# Patient Record
Sex: FEMALE | Race: WHITE | NOT HISPANIC OR LATINO | Employment: OTHER | URBAN - METROPOLITAN AREA
[De-identification: names, ages, dates, MRNs, and addresses within clinical notes are randomized per-mention and may not be internally consistent; named-entity substitution may affect disease eponyms.]

---

## 2017-02-02 PROBLEM — M53.3 COCCYGEAL PAIN, CHRONIC: Status: ACTIVE | Noted: 2017-02-02

## 2017-02-02 PROBLEM — G89.29 COCCYGEAL PAIN, CHRONIC: Status: ACTIVE | Noted: 2017-02-02

## 2018-03-06 ENCOUNTER — TELEMEDICINE2 (OUTPATIENT)
Dept: ENDOCRINOLOGY | Facility: MEDICAL CENTER | Age: 58
End: 2018-03-06
Payer: COMMERCIAL

## 2018-03-06 VITALS
DIASTOLIC BLOOD PRESSURE: 88 MMHG | HEART RATE: 77 BPM | WEIGHT: 143 LBS | OXYGEN SATURATION: 95 % | BODY MASS INDEX: 28.07 KG/M2 | HEIGHT: 60 IN | SYSTOLIC BLOOD PRESSURE: 130 MMHG

## 2018-03-06 DIAGNOSIS — Z98.84 HISTORY OF GASTRIC BYPASS: ICD-10-CM

## 2018-03-06 DIAGNOSIS — M81.0 POSTMENOPAUSAL OSTEOPOROSIS: ICD-10-CM

## 2018-03-06 DIAGNOSIS — E55.9 VITAMIN D DEFICIENCY: ICD-10-CM

## 2018-03-06 PROCEDURE — 99204 OFFICE O/P NEW MOD 45 MIN: CPT | Mod: GT | Performed by: INTERNAL MEDICINE

## 2018-03-06 RX ORDER — TIZANIDINE 4 MG/1
4 TABLET ORAL EVERY 6 HOURS PRN
COMMUNITY
End: 2021-01-09

## 2018-03-06 RX ORDER — OXYCODONE AND ACETAMINOPHEN 2.5; 325 MG/1; MG/1
1 TABLET ORAL EVERY 4 HOURS PRN
COMMUNITY
End: 2019-02-26

## 2018-03-06 NOTE — PROGRESS NOTES
New Patient Consult Note  Primary care physician: Linsey West D.O.    Reason for consult: Osteoporosis    HPI:  Lachelle Hilton is a 57 y.o. old patient who presents for tele-consultation today for  osteoporosis. She was diagnosed with osteoporosis about 10 years ago. She underwent gastric bypass surgery, Gurinder-en-Y procedure, about 19 years ago. She took 4 yearly infusions of Reclast, most recent infusion was done in January 2017. No history of hip fracture. No history of vertebral compression fracture. She does have history of hip fracture, hand fracture, toe fracture in the past a few years with minimal trauma. She denies chronic issues with diarrhea. She stopped having periods at age 25, she was told that she had premature menopause. She was never started on estrogen replacement. She remembered having hot flashes and postmenopausal symptoms in her mid 20s. She takes vitamin D 4000 IUs daily. She takes supplemental calcium once a day with dinner. She does not take bariatric multivitamin/mineral supplement.    ROS:  Constitutional: Psitive for weight loss  Cardiac: No palpitations or racing heart  Resp: No shortness of breath  All other systems were reviewed and were negative.    Past Medical History:  Patient Active Problem List    Diagnosis Date Noted   • Coccygeal pain, chronic 02/02/2017   • Anxiousness 11/10/2016   • Vitamin B 12 deficiency 11/10/2016   • Lumbar spine pain 11/10/2016   • Osteoporosis 11/10/2016   • Reactive airway disease without complication 11/10/2016       Past Surgical History:  Past Surgical History:   Procedure Laterality Date   • GASTRIC BYPASS LAPAROSCOPIC  April 1999    gurinder-en-y collapsed; complications over one year after       Allergies:  Nsaids    Social History:  Social History     Social History   • Marital status:      Spouse name: N/A   • Number of children: N/A   • Years of education: N/A     Occupational History   • Not on file.     Social History Main Topics   •  Smoking status: Never Smoker   • Smokeless tobacco: Never Used   • Alcohol use No   • Drug use: No   • Sexual activity: Yes     Partners: Male      Comment: ; moved from [facility] in July 2016. One child from a previous marriage     Other Topics Concern   • Not on file     Social History Narrative   • No narrative on file       Family History:  Family History   Problem Relation Age of Onset   • Heart Disease Mother 67     CHF   • Cancer Father 58     brain and lung; former smoker       Medications:    Current Outpatient Prescriptions:   •  oxycodone-acetaminophen (PERCOCET) 2.5-325 MG per tablet, Take 1 Tab by mouth every four hours as needed for Severe Pain., Disp: , Rfl:   •  tizanidine (ZANAFLEX) 4 MG Tab, Take 4 mg by mouth every 6 hours as needed., Disp: , Rfl:   •  Calcium 500-100 MG-UNIT Chew Tab, Take  by mouth., Disp: , Rfl:   •  albuterol 108 (90 BASE) MCG/ACT Aero Soln inhalation aerosol, Inhale 2 Puffs by mouth every 6 hours as needed for Shortness of Breath., Disp: 8.5 g, Rfl: 2  •  Omega-3 Fatty Acids (FISH OIL) 1000 MG Cap capsule, Take 1,000 mg by mouth 3 times a day, with meals., Disp: , Rfl:   •  vitamin D (CHOLECALCIFEROL) 1000 UNIT Tab, Take  by mouth every day. 2000 units, Disp: , Rfl:   •  Ascorbic Acid (VITAMIN C) 500 MG Cap, Take  by mouth., Disp: , Rfl:   •  Magnesium 250 MG Tab, Take  by mouth., Disp: , Rfl:   •  Multiple Vitamins Tab, Take  by mouth., Disp: , Rfl:   •  lorazepam (ATIVAN) 0.5 MG Tab, Take 1 Tab by mouth 1 time daily as needed for Anxiety., Disp: 30 Tab, Rfl: 0  •  hydrocodone-acetaminophen (NORCO) 5-325 MG Tab per tablet, Take 1 Tab by mouth 1 time daily as needed. 1 every 4 hrs prn pain, Disp: 30 Tab, Rfl: 0    Physical Examination:  Vital signs: /88   Pulse 77   Ht 1.524 m (5')   Wt 64.9 kg (143 lb)   SpO2 95%   BMI 27.93 kg/m²   General: No apparent distress, cooperative  Eyes: No obvious exophthalmos  ENMT: Normal on external inspection of nose,  lips  Neck: On visual inspection no obvious mass seen  Resp: Normal effort, clear to auscultation bilaterally (this was done by trained tele-presenter at the originating site)  CVS: Regular rate and rhythm (this was done by trained tele-presenter at the originating site)  Extremities: No edema (this was done by trained tele-presenter at the originating site)  Neuro: Alert and oriented  Skin: No rash on visible skin  Psych: Normal mood and affect    Assessment and Plan:    Postmenopausal osteoporosis  · Most recent bone density scan was done in June 2016, on which she still had osteoporosis  · She has received four yearly infusions of Reclast, most recently in January 2017  · We will repeat bone density scan now, depending upon bone density results we will discuss about Forteo or Prolia  · Recent 24-hour urine calcium was less than 75 mg, indicating inadequate intake/absorption of calcium from diet  · Advised to take supplemental calcium calcium citrate 600 milligram 3 times a day with meals  -     DS-BONE DENSITY STUDY (DEXA); Future    Vitamin D deficiency  · Recent vitamin D level is 31, and her PTH is slightly elevated at 85, which is most likely secondary hyperparathyroidism due to inadequate calcium intake/absorption from diet  · Advised to double the dose of her vitamin D supplement for now    History of gastric bypass  · We will obtain yearly recommended labs for patient after gastric bypass surgery  -     VITAMIN A; Future  -     VITAMIN E; Future  -     VITAMIN K; Future  -     VITAMIN B1; Future  -     VITAMIN B12; Future  -     FOLATE; Future  -     ZINC SERUM; Future  -     COPPER, SERUM; Future  -     SELENIUM, SERUM; Future  -     VITAMIN C SERUM; Future    Return in about 4 weeks (around 4/3/2018).    Thank you for allowing me to participate in the care of this patient.    This consultation was conducted utilizing secure and encrypted videoconferencing equipment with the assistance of a trained  tele-presenter at the originating site.    Zoey Collier M.D.  03/06/18    CC:   Linsey West D.O.    This note was created using voice recognition software (Dragon). The accuracy of the dictation is limited by the abilities of the software. I have reviewed the note prior to signing, however some errors in grammar and context are still possible. If you have any questions related to this note please do not hesitate to contact our office.

## 2018-03-15 DIAGNOSIS — M81.0 POSTMENOPAUSAL OSTEOPOROSIS: ICD-10-CM

## 2018-04-17 PROBLEM — J30.89 ENVIRONMENTAL AND SEASONAL ALLERGIES: Status: ACTIVE | Noted: 2018-04-17

## 2018-11-26 PROBLEM — M81.0 AGE-RELATED OSTEOPOROSIS WITHOUT CURRENT PATHOLOGICAL FRACTURE: Status: ACTIVE | Noted: 2018-11-26

## 2019-01-23 ENCOUNTER — OFFICE VISIT (OUTPATIENT)
Dept: ENDOCRINOLOGY | Facility: MEDICAL CENTER | Age: 59
End: 2019-01-23
Payer: COMMERCIAL

## 2019-01-23 VITALS
SYSTOLIC BLOOD PRESSURE: 116 MMHG | BODY MASS INDEX: 26.11 KG/M2 | HEART RATE: 84 BPM | OXYGEN SATURATION: 95 % | HEIGHT: 60 IN | WEIGHT: 133 LBS | DIASTOLIC BLOOD PRESSURE: 64 MMHG

## 2019-01-23 DIAGNOSIS — E28.319 MENOPAUSE, PREMATURE: ICD-10-CM

## 2019-01-23 DIAGNOSIS — R79.89 HIGH SERUM PARATHYROID HORMONE (PTH): ICD-10-CM

## 2019-01-23 DIAGNOSIS — Z98.84 BARIATRIC SURGERY STATUS: ICD-10-CM

## 2019-01-23 DIAGNOSIS — M81.0 OSTEOPOROSIS WITHOUT CURRENT PATHOLOGICAL FRACTURE, UNSPECIFIED OSTEOPOROSIS TYPE: ICD-10-CM

## 2019-01-23 DIAGNOSIS — E53.8 VITAMIN B 12 DEFICIENCY: ICD-10-CM

## 2019-01-23 DIAGNOSIS — E55.9 VITAMIN D DEFICIENCY: ICD-10-CM

## 2019-01-23 DIAGNOSIS — M81.0 AGE-RELATED OSTEOPOROSIS WITHOUT CURRENT PATHOLOGICAL FRACTURE: ICD-10-CM

## 2019-01-23 PROCEDURE — 99214 OFFICE O/P EST MOD 30 MIN: CPT | Performed by: INTERNAL MEDICINE

## 2019-01-23 RX ORDER — VITAMIN E 268 MG
400 CAPSULE ORAL DAILY
COMMUNITY

## 2019-01-24 NOTE — PROGRESS NOTES
Chief Complaint   Patient presents with   • Osteoporosis   • Menopause     Premature   • Vitamin D Deficiency         Chief complaint        Osteoporosis    Patient was previously treated by Dr. Collier who has now left our office to join the staff at the [facility].  He is seeing her primarily for management of osteoporosis.     Her most recent bone density that I have was done March 2018.  At that point, she did have significant osteoporosis with a T-score of -2.9 in the lumbar spine and -3.4 at the left hip.      The patient has had major events in her life that have set the stage for her osteoporosis.  Initially she went through premature menopause at age 25.  She had a tubal ligation and her menses stopped after that and apparently she was estrogen deficient.  She is unclear about estrogen replacement over the years subsequently but I don’t think it was significant and steady.  The next major event was about 20 years ago when she underwent bariatric surgery, a sabine-en-Y procedure at [facility].  That apparently had complications.  She was in the hospital for months.  She had open wounds in her abdomen that were slow to heal.  She lost significant weight during that period of time.    More recently she had taken Reclast for four years.  That apparently held the line on her bone density but it did not improve her bone density.  Subsequently she has been started on Forteo in March 2018.  She went on vacation September 2018 and did not take her Forteo with her.  After she returned home, she was out of the habit of using it and has simply just stopped using it since then.  For some reason, she does not want to go back to Forteo.  She would like to switch to Prolia which will be fine with me.  Perhaps we should get a new bone density this coming March which is a year from the last one to serve as a new baseline to measure the effectiveness of Prolia.      Because of her bypass she has had a vitamin B12 deficiency and gets  "monthly injections.  She does take vitamin D as a calcium vitamin D supplement.  I do not have recent laboratory data that evaluates her deficiencies and her supplements.      We will catch up on her lab data and also a bone density in March.  I will submit an authorization for Prolia which would be my first choice.  Then we can haveForteo for back up in the future if she starts to have serious fracture problems.  She is in agreement.    ROS:  She has been exercising effectively she is managing her weight that way and also building strength.  It does cause a good deal of soreness and stiffness but she is determined to stay with.      Allergies:   Allergies   Allergen Reactions   • Nsaids      Other reaction(s): Adverse Drug Reaction  \"Bleeder\"       Current medicines including changes today:  Current Outpatient Prescriptions   Medication Sig Dispense Refill   • vitamin e (VITAMIN E) 400 UNIT Cap Take 400 Units by mouth every day.     • B Complex Vitamins (B COMPLEX 1 PO) Take  by mouth.     • omeprazole (PRILOSEC) 20 MG delayed-release capsule TAKE 1 CAPSULE BY MOUTH EVERY DAY 90 Cap 1   • oxycodone-acetaminophen (PERCOCET) 2.5-325 MG per tablet Take 1 Tab by mouth every four hours as needed for Severe Pain.     • tizanidine (ZANAFLEX) 4 MG Tab Take 4 mg by mouth every 6 hours as needed.     • Calcium 500-100 MG-UNIT Chew Tab Take  by mouth.     • Multiple Vitamins Tab Take  by mouth.     • PROAIR  (90 Base) MCG/ACT Aero Soln inhalation aerosol INHALE 2 PUFFS BY MOUTH EVERY 6 HOURS AS NEEDED FOR SHORTNESS OF BREATH 8.5 g 2   • betamethasone dipropionate (DIPROLENE) 0.05 % Ointment Used to affected areas of the feet for 1-2 weeks (Patient not taking: Reported on 1/23/2019) 1 Tube 0   • montelukast (SINGULAIR) 10 MG Tab Take 1 Tab by mouth every day. (Patient not taking: Reported on 1/23/2019) 30 Tab 5   • mometasone (NASONEX) 50 MCG/ACT nasal spray Spray 2 Sprays in nose every day. (Patient not taking: " Reported on 1/23/2019) 1 Inhaler 0   • benzonatate (TESSALON) 200 MG capsule Take 1 Cap by mouth 3 times a day as needed for Cough. (Patient not taking: Reported on 1/23/2019) 30 Cap 0   • Teriparatide, Recombinant, (FORTEO) 600 MCG/2.4ML Solution Inject 20 mcg as instructed every day. (Patient not taking: Reported on 1/23/2019) 1 PEN 6   • Omega-3 Fatty Acids (FISH OIL) 1000 MG Cap capsule Take 1,000 mg by mouth 3 times a day, with meals.     • vitamin D (CHOLECALCIFEROL) 1000 UNIT Tab Take  by mouth every day. 2000 units     • Ascorbic Acid (VITAMIN C) 500 MG Cap Take  by mouth.     • Magnesium 250 MG Tab Take  by mouth.       No current facility-administered medications for this visit.         Past Medical History:   Diagnosis Date   • Anxiety    • Arthritis    • Coccygeal pain, chronic 2012    an employee ran into her and she fell on tailbone   • History of colonoscopy 07/15/2014    Normal and good for 10 years.  Done at [facility].   • History of mammogram 6/17/16    mammo   • History of Papanicolaou smear of cervix 6/24/16    normal at [facility]   • History of pneumococcal infection 1999; 2011   • History of pneumonia     Never a smoker   • Lumbar spine pain     Last epidural in early 2013, she's had trigger point injections that are somewhat helpful.   • Osteoporosis 2014    RECLAST received 9/'14, 9/'15 thru [facility]; last dexa at [facility] showed T-score -3.0 total L hip   • Osteoporosis     Reclast #3 received 1/11[place]17 [place] [facility]       PHYSICAL EXAM:    /64 (BP Location: Left arm, Patient Position: Sitting)   Pulse 84   Ht 1.524 m (5')   Wt 60.3 kg (133 lb)   SpO2 95%   BMI 25.97 kg/m²     Gen.   appears healthy considering her past history    Skin   appropriate for sex and age    HEENT  unremarkable    Neck   no palpable thyroid or adenopathy    Heart  regular    Extremities  no edema    Neuro  gait and station normal    Psych  appropriate, good spirits      ASSESSMENT AND RECOMMENDATIONS    1. osteoporosis  without current pathological fracture             See HPI for significant causative events    2. Vitamin D deficiency         I do not think she is getting enough vitamin D through her calcium vitamin D combination           - VITAMIN D,25 HYDROXY; Future    3. Vitamin B 12 deficiency              Secondary to bariatric surgery.              Monthly B12 injections through PCP  - VITAMIN B12; Future    4. High serum parathyroid hormone (PTH)              In the past this was felt to be due to vitamin D deficiency.              Reassessment is indicated  - PTH INTACT (PTH ONLY); Future    5. Menopause, premature                Unknown etiology  - ESTRADIOL; Future  - FSH; Future    6. Bariatric surgery status             Serious complications following this surgery at [facility] 20 years ago  - FERRITIN; Future  - IRON/TOTAL IRON BIND; Future  - COMP METABOLIC PANEL; Future  - CBC WITH DIFFERENTIAL; Future  - COMP METABOLIC PANEL; Future  - FREE THYROXINE; Future  - TSH; Future  - PTH INTACT (PTH ONLY); Future  - VITAMIN D,25 HYDROXY; Future      DISPOSITION: Initial follow-up through my chart or telephone                           Submit for authorization for Prolia                            If stable return in 6 months      Charbel Livingston M.D.    Copies to: Linsey West D.O. [phone]

## 2019-03-21 ENCOUNTER — TELEPHONE (OUTPATIENT)
Dept: ENDOCRINOLOGY | Facility: MEDICAL CENTER | Age: 59
End: 2019-03-21

## 2019-03-21 DIAGNOSIS — M81.0 OSTEOPOROSIS WITHOUT CURRENT PATHOLOGICAL FRACTURE, UNSPECIFIED OSTEOPOROSIS TYPE: ICD-10-CM

## 2019-03-21 DIAGNOSIS — Z78.0 MENOPAUSE: ICD-10-CM

## 2019-03-21 DIAGNOSIS — R79.89 HIGH SERUM PARATHYROID HORMONE (PTH): Primary | ICD-10-CM

## 2019-03-21 DIAGNOSIS — E55.9 VITAMIN D DEFICIENCY DISEASE: ICD-10-CM

## 2019-03-21 NOTE — TELEPHONE ENCOUNTER
Telephone conversation with patient    The patient does not have a supplement for calcium that she is taking.  She has some but not taking it.  She has a supplement that has 600 mg of calcium so I told her to take 4 a day.  I think that is going to make a difference in her PTH level.    Charbel Livingston M.D.

## 2019-03-21 NOTE — TELEPHONE ENCOUNTER
Telephone conversation with patient    I reviewed laboratory data from February.  CBC and chemistry panel looks very good.  Iron is marginal but she does take that I think in large quantities.  Albumin 3.8 recalculate's to a calcium of about 8.3.    She has supplements to have large doses of calcium vitamin D and iron.  She takes B12 shots every month    Vitamin D is 37 and this surprises PTH is 179.  It was marginally elevated before.  I do not know what accounts for this except for hypocalcemia and marginally low vitamin D.    She takes vitamin D 3000 units daily and I asked her to add 2000 units more.  I will ask her to increase her calcium bit and recheck her PTH next month    Also I phoned the mail in pharmacy [facility] specialty pharmacy and placed an order for Prolia 60 mg.  Phone numbers [phone].  I will be contacted by insurance if they need a preauthorization.  They will send Prolia to our office.    She described in some detail the extraordinary complications she went through with her bypass surgery Gurinder-en-Y gone wrong with peritonitis, multiple surgeries, loss of bowel and resultant malabsorption.  Bed ridden and comatose for about 3 months.  No doubt where the osteoporosis came from.    I will also put an order in for bone density to be done at [facility].    Charbel Livingston M.D.

## 2019-03-28 ENCOUNTER — TELEPHONE (OUTPATIENT)
Dept: ENDOCRINOLOGY | Facility: MEDICAL CENTER | Age: 59
End: 2019-03-28

## 2019-03-28 NOTE — TELEPHONE ENCOUNTER
Telephone conversation with [facility]    I did preauthorization for Prolia conversation with the representative over the telephone.  It seemed to be satisfactory.    Reference for [facility] authorization is [place] [id number]    They will send the medication to my office.    Charbel Livingston M.D.

## 2019-04-15 ENCOUNTER — TELEPHONE (OUTPATIENT)
Dept: ENDOCRINOLOGY | Facility: MEDICAL CENTER | Age: 59
End: 2019-04-15

## 2019-04-15 NOTE — TELEPHONE ENCOUNTER
1. Caller Name: Lachelle                                         Call Back Number: [phone] (home)         Patient approves a detailed voicemail message: no    Patient called wondering where we were in the process of getting her Prolia. Looks like there was a Prior auth submitted and approved through [facility]. Called The specialty pharmacy to see where that rx went. They were then waiting on patient to approve the cost of the medicaiton ($60). Then they will send out.   Once we get the medication we can scheduled   [facility] at Pharmacy [phone]

## 2019-04-24 ENCOUNTER — NON-PROVIDER VISIT (OUTPATIENT)
Dept: ENDOCRINOLOGY | Facility: MEDICAL CENTER | Age: 59
End: 2019-04-24
Payer: COMMERCIAL

## 2019-04-24 DIAGNOSIS — M81.0 AGE-RELATED OSTEOPOROSIS WITHOUT CURRENT PATHOLOGICAL FRACTURE: ICD-10-CM

## 2019-04-24 PROCEDURE — 96372 THER/PROPH/DIAG INJ SC/IM: CPT | Performed by: INTERNAL MEDICINE

## 2019-07-17 ENCOUNTER — OFFICE VISIT (OUTPATIENT)
Dept: ENDOCRINOLOGY | Facility: MEDICAL CENTER | Age: 59
End: 2019-07-17
Payer: COMMERCIAL

## 2019-07-17 VITALS
HEART RATE: 66 BPM | BODY MASS INDEX: 24.92 KG/M2 | OXYGEN SATURATION: 96 % | WEIGHT: 123.6 LBS | DIASTOLIC BLOOD PRESSURE: 78 MMHG | SYSTOLIC BLOOD PRESSURE: 126 MMHG | HEIGHT: 59 IN

## 2019-07-17 DIAGNOSIS — M81.0 AGE-RELATED OSTEOPOROSIS WITHOUT CURRENT PATHOLOGICAL FRACTURE: ICD-10-CM

## 2019-07-17 DIAGNOSIS — E55.9 VITAMIN D DEFICIENCY DISEASE: ICD-10-CM

## 2019-07-17 DIAGNOSIS — Z78.0 MENOPAUSE: ICD-10-CM

## 2019-07-17 DIAGNOSIS — R79.89 HIGH SERUM PARATHYROID HORMONE (PTH): ICD-10-CM

## 2019-07-17 PROCEDURE — 99214 OFFICE O/P EST MOD 30 MIN: CPT | Performed by: INTERNAL MEDICINE

## 2019-07-17 RX ORDER — BACLOFEN 10 MG/1
TABLET ORAL
Refills: 2 | COMMUNITY
Start: 2019-06-12 | End: 2019-12-05

## 2019-07-18 NOTE — PROGRESS NOTES
"Chief Complaint   Patient presents with   • Osteoporosis   • Hyperparathyroidism   • Vitamin D Deficiency        HPI:    Osteoporosis             No new developments in terms of the osteoporosis.  No fractures or unusual pain.  She did receive 1 dose of Prolia in April.  Dr. Collier had ordered Forteo last March 2018 but she has never received it or taken it.            She is taking supplemental Caltrate 2/day probably 1200 mg.  Vitamin D also but she cannot name the amount.  We also wanted to update her bone density which also did not get done yet.    Elevated PTH.         In February she had a markedly elevated PTH of 179 which was to be followed up along with a vitamin D level.  Her vitamin D at that time February was 37 so that PTH level is unaccounted for.  She has no previous history of hyperparathyroidism.  We will update her PTH and vitamin D now along with a chemistry panel.      ROS:  Her gastric bypass is intact.  She continues to exercise within her limits.      Allergies:   Allergies   Allergen Reactions   • Nsaids      Other reaction(s): Adverse Drug Reaction  \"Bleeder\"       Current medicines including changes today:  Current Outpatient Prescriptions   Medication Sig Dispense Refill   • montelukast (SINGULAIR) 10 MG Tab 1/2-1 pill by mouth nightly for allergies 30 Tab 1   • LORazepam (ATIVAN) 0.5 MG Tab TAKE 1 TABLET PO QD  2   • oxyCODONE immediate-release (ROXICODONE) 5 MG Tab Take 2.5 mg by mouth every morning.     • vitamin e (VITAMIN E) 400 UNIT Cap Take 400 Units by mouth every day.     • B Complex Vitamins (B COMPLEX 1 PO) Take  by mouth.     • PROAIR  (90 Base) MCG/ACT Aero Soln inhalation aerosol INHALE 2 PUFFS BY MOUTH EVERY 6 HOURS AS NEEDED FOR SHORTNESS OF BREATH 8.5 g 2   • tizanidine (ZANAFLEX) 4 MG Tab Take 4 mg by mouth every 6 hours as needed.     • Calcium 500-100 MG-UNIT Chew Tab Take  by mouth.     • Omega-3 Fatty Acids (FISH OIL) 1000 MG Cap capsule Take 1,000 mg by mouth 3 " times a day, with meals.     • vitamin D (CHOLECALCIFEROL) 1000 UNIT Tab Take  by mouth every day. 2000 units     • Ascorbic Acid (VITAMIN C) 500 MG Cap Take  by mouth.     • Multiple Vitamins Tab Take  by mouth.     • baclofen (LIORESAL) 10 MG Tab TK 1 T PO Q 8 H  2   • fluticasone (FLOVENT HFA) 110 MCG/ACT Aerosol 1-2 puffs by mouth up to twice daily for cough (Patient not taking: Reported on 7/17/2019) 1 Inhaler 1   • Multiple Vitamins-Minerals (EQL AIR PROTECTOR PO) Take  by mouth.     • predniSONE (DELTASONE) 20 MG Tab Take 3 tabs by mouth on days 1-3, 2 tabs by mouth on days 4-6, and one tab daily by mouth on days 7-9. (Patient not taking: Reported on 7/16/2019) 18 Tab 0   • omeprazole (PRILOSEC) 20 MG delayed-release capsule TAKE 1 CAPSULE BY MOUTH EVERY DAY 90 Cap 1   • betamethasone dipropionate (DIPROLENE) 0.05 % Ointment Used to affected areas of the feet for 1-2 weeks (Patient not taking: Reported on 7/16/2019) 1 Tube 0   • mometasone (NASONEX) 50 MCG/ACT nasal spray Spray 2 Sprays in nose every day. (Patient not taking: Reported on 1/23/2019) 1 Inhaler 0   • benzonatate (TESSALON) 200 MG capsule Take 1 Cap by mouth 3 times a day as needed for Cough. 30 Cap 0   • Teriparatide, Recombinant, (FORTEO) 600 MCG/2.4ML Solution Inject 20 mcg as instructed every day. (Patient not taking: Reported on 7/17/2019) 1 PEN 6   • Magnesium 250 MG Tab Take  by mouth.       No current facility-administered medications for this visit.         Past Medical History:   Diagnosis Date   • Anxiety    • Arthritis    • Coccygeal pain, chronic 2012    an employee ran into her and she fell on I-MD   • History of colonoscopy 07/15/2014    Normal and good for 10 years.  Done at [facility].   • History of mammogram 6/17/16    mammo   • History of Papanicolaou smear of cervix 6/24/16    normal at [facility]   • History of pneumococcal infection 1999; 2011   • History of pneumonia     Never a smoker   • Lumbar spine pain     Last epidural  "in early 2013, she's had trigger point injections that are somewhat helpful.   • Osteoporosis 2014    RECLAST received 9/'14, 9/'15 thru [facility]; last dexa at [facility] showed T-score -3.0 total L hip   • Osteoporosis     [place] [facility]       PHYSICAL EXAM:    /78 (BP Location: Left arm, Patient Position: Sitting, BP Cuff Size: Adult)   Pulse 66   Ht 1.499 m (4' 11.02\")   Wt 56.1 kg (123 lb 9.6 oz)   SpO2 96%   BMI 24.95 kg/m²     Gen.   appears healthy, good posture    Skin   appropriate for sex and age    HEENT  unremarkable    Neck   no palpable nodules.  I cannot feel her thyroid.    Heart  regular    Extremities  no edema    Neuro  gait and station normal    Psych  appropriate      ASSESSMENT AND RECOMMENDATIONS    1. Age-related osteoporosis without current pathological fracture           Need to update bone density  - Comp Metabolic Panel; Future    2. High serum parathyroid hormone (PTH)             Unknown etiology  - PTH INTACT (PTH ONLY); Future    3. Vitamin D deficiency disease               Taking vitamin D but does not know the dose  - VITAMIN D,25 HYDROXY; Future      DISPOSITION: Follow-up lab by telephone                            Anticipating another dose of Prolia in October    Charbel Livingston M.D.    Copies to: Linsey West D.O. [phone]  "

## 2019-08-01 LAB
25(OH)D3+25(OH)D2 SERPL-MCNC: 33.8 NG/ML (ref 30–100)
ALBUMIN SERPL-MCNC: 4.4 G/DL (ref 3.5–5.5)
ALBUMIN/GLOB SERPL: 2.2 {RATIO} (ref 1.2–2.2)
ALP SERPL-CCNC: 58 IU/L (ref 39–117)
ALT SERPL-CCNC: 20 IU/L (ref 0–32)
AST SERPL-CCNC: 27 IU/L (ref 0–40)
BILIRUB SERPL-MCNC: 0.5 MG/DL (ref 0–1.2)
BUN SERPL-MCNC: 11 MG/DL (ref 6–24)
BUN/CREAT SERPL: 17 (ref 9–23)
CALCIUM SERPL-MCNC: 9.2 MG/DL (ref 8.7–10.2)
CHLORIDE SERPL-SCNC: 101 MMOL/L (ref 96–106)
CO2 SERPL-SCNC: 23 MMOL/L (ref 20–29)
CREAT SERPL-MCNC: 0.66 MG/DL (ref 0.57–1)
GLOBULIN SER CALC-MCNC: 2 G/DL (ref 1.5–4.5)
GLUCOSE SERPL-MCNC: 95 MG/DL (ref 65–99)
POTASSIUM SERPL-SCNC: 4.3 MMOL/L (ref 3.5–5.2)
PROT SERPL-MCNC: 6.4 G/DL (ref 6–8.5)
PTH-INTACT SERPL-MCNC: 73 PG/ML (ref 15–65)
SODIUM SERPL-SCNC: 141 MMOL/L (ref 134–144)

## 2019-08-13 ENCOUNTER — TELEPHONE (OUTPATIENT)
Dept: ENDOCRINOLOGY | Facility: MEDICAL CENTER | Age: 59
End: 2019-08-13

## 2019-08-13 DIAGNOSIS — E55.9 VITAMIN D DEFICIENCY DISEASE: ICD-10-CM

## 2019-08-13 DIAGNOSIS — R79.89 HIGH SERUM PARATHYROID HORMONE (PTH): ICD-10-CM

## 2019-08-13 DIAGNOSIS — M81.0 AGE-RELATED OSTEOPOROSIS WITHOUT CURRENT PATHOLOGICAL FRACTURE: ICD-10-CM

## 2019-08-13 DIAGNOSIS — R79.89 ELEVATED PTHRP LEVEL: ICD-10-CM

## 2019-08-14 NOTE — TELEPHONE ENCOUNTER
Telephone conversation with patient    We discussed her parathyroid hormone elevation.  There is one that was extremely high for unknown reasons but to others that are only mildly elevated.  Vitamin D is low normal.  I explained the reciprocal relationship between vitamin D and parathyroid hormone.  She is taking 2 800 units of vitamin D now I asked her to go up to 4000 and next time in November check the PTH and calcium and vitamin D and see if that is really a problem.    Charbel Livingston M.D.

## 2019-08-18 ENCOUNTER — TELEPHONE (OUTPATIENT)
Dept: ENDOCRINOLOGY | Facility: MEDICAL CENTER | Age: 59
End: 2019-08-18

## 2019-08-18 NOTE — TELEPHONE ENCOUNTER
Telephone conversation with patient    Per patient note she is now taking 8000 units of vitamin D or per my suggestion it would add up to 8000 and asked more than I want her to take.    She will take 6000 units of vitamin D for the next month.  And then do her labs next month not in November.  I am trying to prove the reciprocal relationship between vitamin D and parathyroid hormone so if we get her vitamin D level up in a reasonable range and her PTH goes to normal and her calcium stays normal then I think that is all I need to see.  She is in agreement and we will see some more labs next month.    Charbel Livingston M.D.

## 2019-10-22 LAB
25(OH)D3+25(OH)D2 SERPL-MCNC: 38.7 NG/ML (ref 30–100)
ALBUMIN SERPL-MCNC: 4.2 G/DL (ref 3.5–5.5)
ALBUMIN/GLOB SERPL: 2.2 {RATIO} (ref 1.2–2.2)
ALP SERPL-CCNC: 57 IU/L (ref 39–117)
ALT SERPL-CCNC: 24 IU/L (ref 0–32)
AST SERPL-CCNC: 26 IU/L (ref 0–40)
BILIRUB SERPL-MCNC: 0.4 MG/DL (ref 0–1.2)
BUN SERPL-MCNC: 9 MG/DL (ref 6–24)
BUN/CREAT SERPL: 15 (ref 9–23)
CALCIUM SERPL-MCNC: 9 MG/DL (ref 8.7–10.2)
CHLORIDE SERPL-SCNC: 101 MMOL/L (ref 96–106)
CO2 SERPL-SCNC: 25 MMOL/L (ref 20–29)
CREAT SERPL-MCNC: 0.62 MG/DL (ref 0.57–1)
GLOBULIN SER CALC-MCNC: 1.9 G/DL (ref 1.5–4.5)
GLUCOSE SERPL-MCNC: 92 MG/DL (ref 65–99)
POTASSIUM SERPL-SCNC: 4.5 MMOL/L (ref 3.5–5.2)
PROT SERPL-MCNC: 6.1 G/DL (ref 6–8.5)
PTH-INTACT SERPL-MCNC: 52 PG/ML (ref 15–65)
SODIUM SERPL-SCNC: 138 MMOL/L (ref 134–144)

## 2019-11-22 ENCOUNTER — APPOINTMENT (OUTPATIENT)
Dept: ENDOCRINOLOGY | Facility: MEDICAL CENTER | Age: 59
End: 2019-11-22
Payer: COMMERCIAL

## 2019-11-22 ENCOUNTER — TELEPHONE (OUTPATIENT)
Dept: ENDOCRINOLOGY | Facility: MEDICAL CENTER | Age: 59
End: 2019-11-22

## 2019-11-22 NOTE — TELEPHONE ENCOUNTER
11/22/19  Pt came in for a Prolia injection today, the appt was scheduled by Vika ALCARAZ on 4/24/19. (Prolia injections are usually only scheduled in-office if we receive a shipment or if the Pt has been approved to come by the office with the injection in hand.)    Pt was under the understanding that she would be getting the injection in the office today.     The Prolia injection order was sent to the [facility] infusion center on 10/16/19 by Vika FERREIRA.     We did not receive any Prolia injection boxes from the infusion center, pharmacy, or  so we can not to the injection at this time in the office.     I spoke to the Pt and informed her that we can not to the injection and she needs to contact the [facility] infusion center to schedule an appt. She became frustrated because she lives in [place] and she had to drive over an hour to get here and she has a hard time driving all that way. She wanted to get the injection today. She requested that I contact the infusion center and discuss the situation with them and see if she can get the injection today so that she would not have to drive back up her again. When I informed her that we may not be able to do that she became more frustrated and asked for a manager then requested again that we appease her and call the infusion center. Because a manager was not available at this time I told the Pt that I would contact the infusion center and see what we can do for her. Asked her to wait and I will come back to talk to her once I have an answer.     Phone Number Called: [phone]    Call outcome: PAR at [facility] inf.     Message: They have the order and get her scheduled but not for a few weeks. No way to do it today. I asked where elese we might be able to send the order. They suggested [facility] inf. in [place]. P) [phone]    Phone Number Called: [phone]    Call outcome: [facility] inf. Rep.     Message: They just need an order sent over with demo's and they  can call the Pt to get scheduled for her inf. F) [phone]    Returned to Pt and informed her that the [facility] isn't able to do it today but I can send the order to [facility] and they will call her to get her schedule don a day theat will be easier for her. She appreciated the fact that I took the time to call and get this information for her and that she will not be able to use a center that is much closer for her and easier for her to get to for her infusion. Pt was given the infusion center contact information to sandi if they do not call her to get her scheduled in the next week.     I have faxed the order over to [facility] Infusion center

## 2019-11-25 ENCOUNTER — TELEPHONE (OUTPATIENT)
Dept: ENDOCRINOLOGY | Facility: MEDICAL CENTER | Age: 59
End: 2019-11-25

## 2019-11-25 NOTE — TELEPHONE ENCOUNTER
Phone Number Called: [phone] (home)       Call outcome: spoke to patient regarding message below    Message: Spoke to patient this morning to apologize for the miscommunication about her Prolia injection. She was very understanding. I told her that for most patients we send them to the infusion center because that is what their insurance will pay for. She is unique and needs to let us know if we need to order it for an in office injection because it is not something we usually carry. She understood. She had not gone to the infusion center to get her injection yet and would like to try again for an in office injection. Told her we would start the process and send order to Specialty pharmacy to get the Prolia. Pt understood.     Spoke to [facility] who place the order for Prolia. Shipment will be here by Wednesday Nov 27

## 2019-11-26 NOTE — TELEPHONE ENCOUNTER
----- Message from Lachelle Hilton sent at 11/23/2019  7:38 AM PST -----  Regarding: Procedure Question  Contact: [phone]  Santosh Napoles  I'm so upset at what happened at my appointment on 11/22/2019.  No prolia med when I got there.  Now they are asking me to go to an infusion center.  I can't afford the extra cost of going to an infusion center.  Can we please reschedule an office visit to have it done like last time. Please ASAP because the weather is changing so quickly. Plan C would be if my regular PC can do it?  Thank you Lachelle Hilton

## 2019-11-26 NOTE — TELEPHONE ENCOUNTER
Phone Number Called: [phone] (home)       Call outcome: spoke to patient regarding message below    Message: Spoke to patient about Friday. Told her we dont usually have Prolia in stock and that she was supposed to have let us know that she is to have it sent to the office. She was very understanding. I was able to Call her specialty pharmacy and get the Prolia order sent to our office. It should arrive later this week.

## 2019-11-27 ENCOUNTER — TELEPHONE (OUTPATIENT)
Dept: ENDOCRINOLOGY | Facility: MEDICAL CENTER | Age: 59
End: 2019-11-27

## 2019-11-27 DIAGNOSIS — M81.0 AGE-RELATED OSTEOPOROSIS WITHOUT CURRENT PATHOLOGICAL FRACTURE: ICD-10-CM

## 2019-12-04 ENCOUNTER — NON-PROVIDER VISIT (OUTPATIENT)
Dept: ENDOCRINOLOGY | Facility: MEDICAL CENTER | Age: 59
End: 2019-12-04
Payer: COMMERCIAL

## 2019-12-04 PROCEDURE — 96372 THER/PROPH/DIAG INJ SC/IM: CPT | Performed by: INTERNAL MEDICINE

## 2019-12-04 NOTE — PROGRESS NOTES
Lachelle Hilton is a 59 y.o. female here for a non-provider visit for PROLIA injection.    Reason for injection: OSTEOPOROSIS  Order in MAR?: Yes  Patient supplied?:Yes  Minimum interval has been met for this injection (per MAR order): Yes    Order and dose verified by: EB  Patient tolerated injection and no adverse effects were observed or reported: Yes    # of Administrations remaining in MAR: 0

## 2019-12-27 ENCOUNTER — TELEPHONE (OUTPATIENT)
Dept: ENDOCRINOLOGY | Facility: MEDICAL CENTER | Age: 59
End: 2019-12-27

## 2019-12-27 NOTE — TELEPHONE ENCOUNTER
Phone Number Called: [phone] (home)       Call outcome: spoke to patient regarding message below    Message: Spoke to patient about her bone density scan. She has not had a recent scan. Sent order to Micah Mcintosh [phone]. Informed patient.

## 2020-01-13 ENCOUNTER — TELEPHONE (OUTPATIENT)
Dept: ENDOCRINOLOGY | Facility: MEDICAL CENTER | Age: 60
End: 2020-01-13

## 2020-01-14 NOTE — TELEPHONE ENCOUNTER
Telephone conversation with patient    Recent bone density has stayed stable in the lumbar spine by 2.4% decrease in that is considered not a significant change.  The hip however did not improve by 5.7% which is a real improvement.  She tolerates Prolia well    Apparently took Forteo for 1 year and it did not help which is how we got to Prolia.  She had a dose last month and will have a net another 1 in June.  She is to contact me in May and we will update her lab and order the next dose.    Charbel Livingston M.D.

## 2020-04-08 DIAGNOSIS — M81.0 OSTEOPOROSIS, UNSPECIFIED OSTEOPOROSIS TYPE, UNSPECIFIED PATHOLOGICAL FRACTURE PRESENCE: ICD-10-CM

## 2020-04-08 RX ORDER — DENOSUMAB 60 MG/ML
60 INJECTION SUBCUTANEOUS
Qty: 1 ML | Refills: 0 | Status: SHIPPED | OUTPATIENT
Start: 2020-04-08 | End: 2020-04-09

## 2020-12-18 ENCOUNTER — PRE-ADMISSION TESTING (OUTPATIENT)
Dept: ADMISSIONS | Facility: MEDICAL CENTER | Age: 60
DRG: 330 | End: 2020-12-18
Attending: COLON & RECTAL SURGERY
Payer: COMMERCIAL

## 2020-12-18 RX ORDER — AMOXICILLIN 500 MG
1 CAPSULE ORAL DAILY
COMMUNITY

## 2020-12-18 RX ORDER — CHOLECALCIFEROL (VITAMIN D3) 50 MCG
6000 TABLET ORAL DAILY
COMMUNITY

## 2020-12-18 RX ORDER — OXYCODONE AND ACETAMINOPHEN 10; 325 MG/1; MG/1
1 TABLET ORAL
Status: ON HOLD | COMMUNITY
End: 2020-12-28

## 2020-12-18 RX ORDER — CALCIUM CARBONATE 300MG(750)
1 TABLET,CHEWABLE ORAL DAILY
COMMUNITY
End: 2022-12-22

## 2020-12-24 ENCOUNTER — ANESTHESIA EVENT (OUTPATIENT)
Dept: SURGERY | Facility: MEDICAL CENTER | Age: 60
DRG: 330 | End: 2020-12-24
Payer: COMMERCIAL

## 2020-12-24 ENCOUNTER — ANESTHESIA (OUTPATIENT)
Dept: SURGERY | Facility: MEDICAL CENTER | Age: 60
DRG: 330 | End: 2020-12-24
Payer: COMMERCIAL

## 2020-12-24 ENCOUNTER — HOSPITAL ENCOUNTER (INPATIENT)
Facility: MEDICAL CENTER | Age: 60
LOS: 5 days | DRG: 330 | End: 2020-12-29
Attending: COLON & RECTAL SURGERY | Admitting: COLON & RECTAL SURGERY
Payer: COMMERCIAL

## 2020-12-24 DIAGNOSIS — J44.9 CHRONIC OBSTRUCTIVE PULMONARY DISEASE, UNSPECIFIED COPD TYPE (HCC): Primary | ICD-10-CM

## 2020-12-24 DIAGNOSIS — M54.50 LUMBAR SPINE PAIN: ICD-10-CM

## 2020-12-24 DIAGNOSIS — Z98.890 S/P EXPLORATORY LAPAROTOMY: ICD-10-CM

## 2020-12-24 LAB
EKG IMPRESSION: NORMAL
ERYTHROCYTE [DISTWIDTH] IN BLOOD BY AUTOMATED COUNT: 44.9 FL (ref 35.9–50)
GLUCOSE BLD-MCNC: 83 MG/DL (ref 65–99)
HCT VFR BLD AUTO: 39 % (ref 37–47)
HGB BLD-MCNC: 13.2 G/DL (ref 12–16)
MCH RBC QN AUTO: 33.7 PG (ref 27–33)
MCHC RBC AUTO-ENTMCNC: 33.8 G/DL (ref 33.6–35)
MCV RBC AUTO: 99.5 FL (ref 81.4–97.8)
PATHOLOGY CONSULT NOTE: NORMAL
PLATELET # BLD AUTO: 295 K/UL (ref 164–446)
PMV BLD AUTO: 10.4 FL (ref 9–12.9)
RBC # BLD AUTO: 3.92 M/UL (ref 4.2–5.4)
WBC # BLD AUTO: 6.7 K/UL (ref 4.8–10.8)

## 2020-12-24 PROCEDURE — 501452 HCHG STAPLES, GIA MULTIFIRE 60/80: Performed by: COLON & RECTAL SURGERY

## 2020-12-24 PROCEDURE — 85027 COMPLETE CBC AUTOMATED: CPT

## 2020-12-24 PROCEDURE — 0DB80ZZ EXCISION OF SMALL INTESTINE, OPEN APPROACH: ICD-10-PCS | Performed by: COLON & RECTAL SURGERY

## 2020-12-24 PROCEDURE — 700105 HCHG RX REV CODE 258: Performed by: COLON & RECTAL SURGERY

## 2020-12-24 PROCEDURE — 501433 HCHG STAPLER, GIA MULTIFIRE 60/80: Performed by: COLON & RECTAL SURGERY

## 2020-12-24 PROCEDURE — 160048 HCHG OR STATISTICAL LEVEL 1-5: Performed by: COLON & RECTAL SURGERY

## 2020-12-24 PROCEDURE — 160036 HCHG PACU - EA ADDL 30 MINS PHASE I: Performed by: COLON & RECTAL SURGERY

## 2020-12-24 PROCEDURE — A9270 NON-COVERED ITEM OR SERVICE: HCPCS | Performed by: ANESTHESIOLOGY

## 2020-12-24 PROCEDURE — 700101 HCHG RX REV CODE 250: Performed by: COLON & RECTAL SURGERY

## 2020-12-24 PROCEDURE — 160035 HCHG PACU - 1ST 60 MINS PHASE I: Performed by: COLON & RECTAL SURGERY

## 2020-12-24 PROCEDURE — 160029 HCHG SURGERY MINUTES - 1ST 30 MINS LEVEL 4: Performed by: COLON & RECTAL SURGERY

## 2020-12-24 PROCEDURE — 306637 HCHG MISC ORTHO ITEM RC 0274: Performed by: COLON & RECTAL SURGERY

## 2020-12-24 PROCEDURE — 160002 HCHG RECOVERY MINUTES (STAT): Performed by: COLON & RECTAL SURGERY

## 2020-12-24 PROCEDURE — 700111 HCHG RX REV CODE 636 W/ 250 OVERRIDE (IP): Performed by: NURSE PRACTITIONER

## 2020-12-24 PROCEDURE — 700111 HCHG RX REV CODE 636 W/ 250 OVERRIDE (IP): Performed by: ANESTHESIOLOGY

## 2020-12-24 PROCEDURE — 82962 GLUCOSE BLOOD TEST: CPT

## 2020-12-24 PROCEDURE — 93010 ELECTROCARDIOGRAM REPORT: CPT | Performed by: INTERNAL MEDICINE

## 2020-12-24 PROCEDURE — 99221 1ST HOSP IP/OBS SF/LOW 40: CPT | Performed by: STUDENT IN AN ORGANIZED HEALTH CARE EDUCATION/TRAINING PROGRAM

## 2020-12-24 PROCEDURE — C1765 ADHESION BARRIER: HCPCS | Performed by: COLON & RECTAL SURGERY

## 2020-12-24 PROCEDURE — A9270 NON-COVERED ITEM OR SERVICE: HCPCS | Performed by: NURSE PRACTITIONER

## 2020-12-24 PROCEDURE — 160009 HCHG ANES TIME/MIN: Performed by: COLON & RECTAL SURGERY

## 2020-12-24 PROCEDURE — 0DN80ZZ RELEASE SMALL INTESTINE, OPEN APPROACH: ICD-10-PCS | Performed by: COLON & RECTAL SURGERY

## 2020-12-24 PROCEDURE — 0D180Z8 BYPASS SMALL INTESTINE TO SMALL INTESTINE, OPEN APPROACH: ICD-10-PCS | Performed by: COLON & RECTAL SURGERY

## 2020-12-24 PROCEDURE — 160041 HCHG SURGERY MINUTES - EA ADDL 1 MIN LEVEL 4: Performed by: COLON & RECTAL SURGERY

## 2020-12-24 PROCEDURE — 501435 HCHG STAPLER, LINEAR 60: Performed by: COLON & RECTAL SURGERY

## 2020-12-24 PROCEDURE — 502704 HCHG DEVICE, LIGASURE IMPACT: Performed by: COLON & RECTAL SURGERY

## 2020-12-24 PROCEDURE — A9270 NON-COVERED ITEM OR SERVICE: HCPCS | Performed by: COLON & RECTAL SURGERY

## 2020-12-24 PROCEDURE — 88307 TISSUE EXAM BY PATHOLOGIST: CPT

## 2020-12-24 PROCEDURE — 700105 HCHG RX REV CODE 258: Performed by: ANESTHESIOLOGY

## 2020-12-24 PROCEDURE — 501460 HCHG STAPLER, TA55 35LD: Performed by: COLON & RECTAL SURGERY

## 2020-12-24 PROCEDURE — 700105 HCHG RX REV CODE 258: Performed by: NURSE PRACTITIONER

## 2020-12-24 PROCEDURE — 700101 HCHG RX REV CODE 250: Performed by: ANESTHESIOLOGY

## 2020-12-24 PROCEDURE — 501838 HCHG SUTURE GENERAL: Performed by: COLON & RECTAL SURGERY

## 2020-12-24 PROCEDURE — 770006 HCHG ROOM/CARE - MED/SURG/GYN SEMI*

## 2020-12-24 PROCEDURE — 700102 HCHG RX REV CODE 250 W/ 637 OVERRIDE(OP): Performed by: ANESTHESIOLOGY

## 2020-12-24 PROCEDURE — 93005 ELECTROCARDIOGRAM TRACING: CPT | Performed by: COLON & RECTAL SURGERY

## 2020-12-24 PROCEDURE — 700102 HCHG RX REV CODE 250 W/ 637 OVERRIDE(OP): Performed by: NURSE PRACTITIONER

## 2020-12-24 PROCEDURE — 64488 TAP BLOCK BI INJECTION: CPT | Performed by: COLON & RECTAL SURGERY

## 2020-12-24 PROCEDURE — 0WUF0JZ SUPPLEMENT ABDOMINAL WALL WITH SYNTHETIC SUBSTITUTE, OPEN APPROACH: ICD-10-PCS | Performed by: COLON & RECTAL SURGERY

## 2020-12-24 RX ORDER — MEPERIDINE HYDROCHLORIDE 25 MG/ML
12.5 INJECTION INTRAMUSCULAR; INTRAVENOUS; SUBCUTANEOUS
Status: DISCONTINUED | OUTPATIENT
Start: 2020-12-24 | End: 2020-12-24 | Stop reason: HOSPADM

## 2020-12-24 RX ORDER — ALBUTEROL SULFATE 90 UG/1
2 AEROSOL, METERED RESPIRATORY (INHALATION) EVERY 6 HOURS PRN
Status: DISCONTINUED | OUTPATIENT
Start: 2020-12-24 | End: 2020-12-29 | Stop reason: HOSPADM

## 2020-12-24 RX ORDER — GABAPENTIN 300 MG/1
300 CAPSULE ORAL ONCE
Status: COMPLETED | OUTPATIENT
Start: 2020-12-24 | End: 2020-12-24

## 2020-12-24 RX ORDER — OXYCODONE HCL 10 MG/1
10 TABLET, FILM COATED, EXTENDED RELEASE ORAL ONCE
Status: COMPLETED | OUTPATIENT
Start: 2020-12-24 | End: 2020-12-24

## 2020-12-24 RX ORDER — LIDOCAINE HYDROCHLORIDE 20 MG/ML
INJECTION, SOLUTION EPIDURAL; INFILTRATION; INTRACAUDAL; PERINEURAL PRN
Status: DISCONTINUED | OUTPATIENT
Start: 2020-12-24 | End: 2020-12-24 | Stop reason: SURG

## 2020-12-24 RX ORDER — ONDANSETRON 2 MG/ML
4 INJECTION INTRAMUSCULAR; INTRAVENOUS EVERY 4 HOURS PRN
Status: DISCONTINUED | OUTPATIENT
Start: 2020-12-24 | End: 2020-12-29 | Stop reason: HOSPADM

## 2020-12-24 RX ORDER — HALOPERIDOL 5 MG/ML
1 INJECTION INTRAMUSCULAR
Status: DISCONTINUED | OUTPATIENT
Start: 2020-12-24 | End: 2020-12-24 | Stop reason: HOSPADM

## 2020-12-24 RX ORDER — HYDROMORPHONE HYDROCHLORIDE 1 MG/ML
0.5 INJECTION, SOLUTION INTRAMUSCULAR; INTRAVENOUS; SUBCUTANEOUS
Status: DISCONTINUED | OUTPATIENT
Start: 2020-12-24 | End: 2020-12-24 | Stop reason: HOSPADM

## 2020-12-24 RX ORDER — HYDROMORPHONE HYDROCHLORIDE 1 MG/ML
0.2 INJECTION, SOLUTION INTRAMUSCULAR; INTRAVENOUS; SUBCUTANEOUS
Status: DISCONTINUED | OUTPATIENT
Start: 2020-12-24 | End: 2020-12-24 | Stop reason: HOSPADM

## 2020-12-24 RX ORDER — MIDAZOLAM HYDROCHLORIDE 1 MG/ML
INJECTION INTRAMUSCULAR; INTRAVENOUS PRN
Status: DISCONTINUED | OUTPATIENT
Start: 2020-12-24 | End: 2020-12-24 | Stop reason: SURG

## 2020-12-24 RX ORDER — HYDROMORPHONE HYDROCHLORIDE 1 MG/ML
0.5 INJECTION, SOLUTION INTRAMUSCULAR; INTRAVENOUS; SUBCUTANEOUS
Status: DISCONTINUED | OUTPATIENT
Start: 2020-12-24 | End: 2020-12-27

## 2020-12-24 RX ORDER — ACETAMINOPHEN 500 MG
1000 TABLET ORAL EVERY 6 HOURS
Status: DISPENSED | OUTPATIENT
Start: 2020-12-25 | End: 2020-12-29

## 2020-12-24 RX ORDER — ALPRAZOLAM 0.25 MG/1
0.5 TABLET ORAL ONCE
Status: COMPLETED | OUTPATIENT
Start: 2020-12-24 | End: 2020-12-24

## 2020-12-24 RX ORDER — DEXAMETHASONE SODIUM PHOSPHATE 4 MG/ML
INJECTION, SOLUTION INTRA-ARTICULAR; INTRALESIONAL; INTRAMUSCULAR; INTRAVENOUS; SOFT TISSUE PRN
Status: DISCONTINUED | OUTPATIENT
Start: 2020-12-24 | End: 2020-12-24 | Stop reason: SURG

## 2020-12-24 RX ORDER — CEFOTETAN DISODIUM 2 G/20ML
INJECTION, POWDER, FOR SOLUTION INTRAMUSCULAR; INTRAVENOUS PRN
Status: DISCONTINUED | OUTPATIENT
Start: 2020-12-24 | End: 2020-12-24 | Stop reason: SURG

## 2020-12-24 RX ORDER — ACETAMINOPHEN 500 MG
1000 TABLET ORAL ONCE
Status: COMPLETED | OUTPATIENT
Start: 2020-12-24 | End: 2020-12-24

## 2020-12-24 RX ORDER — ACETAMINOPHEN 10 MG/ML
1000 INJECTION, SOLUTION INTRAVENOUS EVERY 6 HOURS
Status: COMPLETED | OUTPATIENT
Start: 2020-12-24 | End: 2020-12-25

## 2020-12-24 RX ORDER — CALCIUM CARBONATE 500 MG/1
500 TABLET, CHEWABLE ORAL
Status: DISCONTINUED | OUTPATIENT
Start: 2020-12-24 | End: 2020-12-29 | Stop reason: HOSPADM

## 2020-12-24 RX ORDER — GABAPENTIN 300 MG/1
300 CAPSULE ORAL 3 TIMES DAILY
Status: DISCONTINUED | OUTPATIENT
Start: 2020-12-24 | End: 2020-12-28

## 2020-12-24 RX ORDER — HYDROMORPHONE HYDROCHLORIDE 1 MG/ML
0.4 INJECTION, SOLUTION INTRAMUSCULAR; INTRAVENOUS; SUBCUTANEOUS
Status: DISCONTINUED | OUTPATIENT
Start: 2020-12-24 | End: 2020-12-24 | Stop reason: HOSPADM

## 2020-12-24 RX ORDER — LORAZEPAM 2 MG/ML
0.5 INJECTION INTRAMUSCULAR
Status: DISCONTINUED | OUTPATIENT
Start: 2020-12-24 | End: 2020-12-24

## 2020-12-24 RX ORDER — SODIUM CHLORIDE, SODIUM GLUCONATE, SODIUM ACETATE, POTASSIUM CHLORIDE AND MAGNESIUM CHLORIDE 526; 502; 368; 37; 30 MG/100ML; MG/100ML; MG/100ML; MG/100ML; MG/100ML
INJECTION, SOLUTION INTRAVENOUS
Status: DISCONTINUED | OUTPATIENT
Start: 2020-12-24 | End: 2020-12-24 | Stop reason: SURG

## 2020-12-24 RX ORDER — DIPHENHYDRAMINE HYDROCHLORIDE 50 MG/ML
12.5 INJECTION INTRAMUSCULAR; INTRAVENOUS
Status: DISCONTINUED | OUTPATIENT
Start: 2020-12-24 | End: 2020-12-24 | Stop reason: HOSPADM

## 2020-12-24 RX ORDER — DIPHENHYDRAMINE HCL 25 MG
25 TABLET ORAL EVERY 6 HOURS PRN
Status: DISCONTINUED | OUTPATIENT
Start: 2020-12-24 | End: 2020-12-29 | Stop reason: HOSPADM

## 2020-12-24 RX ORDER — ONDANSETRON 2 MG/ML
INJECTION INTRAMUSCULAR; INTRAVENOUS PRN
Status: DISCONTINUED | OUTPATIENT
Start: 2020-12-24 | End: 2020-12-24 | Stop reason: SURG

## 2020-12-24 RX ORDER — SODIUM CHLORIDE, SODIUM LACTATE, POTASSIUM CHLORIDE, AND CALCIUM CHLORIDE .6; .31; .03; .02 G/100ML; G/100ML; G/100ML; G/100ML
500 INJECTION, SOLUTION INTRAVENOUS
Status: DISCONTINUED | OUTPATIENT
Start: 2020-12-24 | End: 2020-12-29 | Stop reason: HOSPADM

## 2020-12-24 RX ORDER — DIPHENHYDRAMINE HYDROCHLORIDE 50 MG/ML
25 INJECTION INTRAMUSCULAR; INTRAVENOUS EVERY 6 HOURS PRN
Status: DISCONTINUED | OUTPATIENT
Start: 2020-12-24 | End: 2020-12-29 | Stop reason: HOSPADM

## 2020-12-24 RX ORDER — SODIUM CHLORIDE, SODIUM LACTATE, POTASSIUM CHLORIDE, CALCIUM CHLORIDE 600; 310; 30; 20 MG/100ML; MG/100ML; MG/100ML; MG/100ML
INJECTION, SOLUTION INTRAVENOUS CONTINUOUS
Status: DISCONTINUED | OUTPATIENT
Start: 2020-12-24 | End: 2020-12-24 | Stop reason: HOSPADM

## 2020-12-24 RX ORDER — SODIUM CHLORIDE, SODIUM LACTATE, POTASSIUM CHLORIDE, CALCIUM CHLORIDE 600; 310; 30; 20 MG/100ML; MG/100ML; MG/100ML; MG/100ML
INJECTION, SOLUTION INTRAVENOUS CONTINUOUS
Status: ACTIVE | OUTPATIENT
Start: 2020-12-24 | End: 2020-12-25

## 2020-12-24 RX ORDER — ROCURONIUM BROMIDE 10 MG/ML
INJECTION, SOLUTION INTRAVENOUS PRN
Status: DISCONTINUED | OUTPATIENT
Start: 2020-12-24 | End: 2020-12-24 | Stop reason: SURG

## 2020-12-24 RX ORDER — DIPHENHYDRAMINE HYDROCHLORIDE 50 MG/ML
12.5 INJECTION INTRAMUSCULAR; INTRAVENOUS EVERY 6 HOURS PRN
Status: DISCONTINUED | OUTPATIENT
Start: 2020-12-24 | End: 2020-12-29 | Stop reason: HOSPADM

## 2020-12-24 RX ORDER — ONDANSETRON 2 MG/ML
4 INJECTION INTRAMUSCULAR; INTRAVENOUS
Status: DISCONTINUED | OUTPATIENT
Start: 2020-12-24 | End: 2020-12-24 | Stop reason: HOSPADM

## 2020-12-24 RX ORDER — HYDROMORPHONE HYDROCHLORIDE 2 MG/ML
INJECTION, SOLUTION INTRAMUSCULAR; INTRAVENOUS; SUBCUTANEOUS PRN
Status: DISCONTINUED | OUTPATIENT
Start: 2020-12-24 | End: 2020-12-24 | Stop reason: SURG

## 2020-12-24 RX ORDER — OXYCODONE HCL 5 MG/5 ML
10 SOLUTION, ORAL ORAL
Status: DISCONTINUED | OUTPATIENT
Start: 2020-12-24 | End: 2020-12-24 | Stop reason: HOSPADM

## 2020-12-24 RX ORDER — DEXMEDETOMIDINE HYDROCHLORIDE 100 UG/ML
INJECTION, SOLUTION INTRAVENOUS PRN
Status: DISCONTINUED | OUTPATIENT
Start: 2020-12-24 | End: 2020-12-24 | Stop reason: SURG

## 2020-12-24 RX ORDER — BUPIVACAINE HYDROCHLORIDE 2.5 MG/ML
INJECTION, SOLUTION EPIDURAL; INFILTRATION; INTRACAUDAL PRN
Status: DISCONTINUED | OUTPATIENT
Start: 2020-12-24 | End: 2020-12-24 | Stop reason: SURG

## 2020-12-24 RX ORDER — PROMETHAZINE HYDROCHLORIDE 25 MG/1
25 SUPPOSITORY RECTAL EVERY 4 HOURS PRN
Status: DISCONTINUED | OUTPATIENT
Start: 2020-12-24 | End: 2020-12-29 | Stop reason: HOSPADM

## 2020-12-24 RX ORDER — OXYCODONE HCL 5 MG/5 ML
5 SOLUTION, ORAL ORAL
Status: DISCONTINUED | OUTPATIENT
Start: 2020-12-24 | End: 2020-12-24 | Stop reason: HOSPADM

## 2020-12-24 RX ORDER — MAGNESIUM SULFATE HEPTAHYDRATE 500 MG/ML
INJECTION, SOLUTION INTRAMUSCULAR; INTRAVENOUS PRN
Status: DISCONTINUED | OUTPATIENT
Start: 2020-12-24 | End: 2020-12-24 | Stop reason: SURG

## 2020-12-24 RX ORDER — SIMETHICONE 80 MG
80 TABLET,CHEWABLE ORAL 3 TIMES DAILY PRN
Status: DISCONTINUED | OUTPATIENT
Start: 2020-12-24 | End: 2020-12-29 | Stop reason: HOSPADM

## 2020-12-24 RX ORDER — HALOPERIDOL 5 MG/ML
1 INJECTION INTRAMUSCULAR EVERY 6 HOURS PRN
Status: DISCONTINUED | OUTPATIENT
Start: 2020-12-24 | End: 2020-12-29 | Stop reason: HOSPADM

## 2020-12-24 RX ORDER — ALPRAZOLAM 0.25 MG/1
0.5 TABLET ORAL 2 TIMES DAILY PRN
Status: DISCONTINUED | OUTPATIENT
Start: 2020-12-24 | End: 2020-12-24

## 2020-12-24 RX ORDER — HYDRALAZINE HYDROCHLORIDE 20 MG/ML
5 INJECTION INTRAMUSCULAR; INTRAVENOUS
Status: DISCONTINUED | OUTPATIENT
Start: 2020-12-24 | End: 2020-12-24 | Stop reason: HOSPADM

## 2020-12-24 RX ADMIN — BUPIVACAINE HYDROCHLORIDE 25 ML: 2.5 INJECTION, SOLUTION EPIDURAL; INFILTRATION; INTRACAUDAL; PERINEURAL at 14:50

## 2020-12-24 RX ADMIN — LIDOCAINE HYDROCHLORIDE 40 MG: 20 INJECTION, SOLUTION EPIDURAL; INFILTRATION; INTRACAUDAL at 14:44

## 2020-12-24 RX ADMIN — GABAPENTIN 300 MG: 300 CAPSULE ORAL at 14:27

## 2020-12-24 RX ADMIN — ONDANSETRON 4 MG: 2 INJECTION INTRAMUSCULAR; INTRAVENOUS at 15:48

## 2020-12-24 RX ADMIN — EPHEDRINE SULFATE 10 MG: 50 INJECTION, SOLUTION INTRAVENOUS at 14:54

## 2020-12-24 RX ADMIN — HYDROMORPHONE HYDROCHLORIDE 1 MG: 2 INJECTION, SOLUTION INTRAMUSCULAR; INTRAVENOUS; SUBCUTANEOUS at 14:44

## 2020-12-24 RX ADMIN — DEXAMETHASONE SODIUM PHOSPHATE 8 MG: 4 INJECTION, SOLUTION INTRA-ARTICULAR; INTRALESIONAL; INTRAMUSCULAR; INTRAVENOUS; SOFT TISSUE at 14:58

## 2020-12-24 RX ADMIN — BUPIVACAINE HYDROCHLORIDE 25 ML: 2.5 INJECTION, SOLUTION EPIDURAL; INFILTRATION; INTRACAUDAL; PERINEURAL at 14:48

## 2020-12-24 RX ADMIN — ROCURONIUM BROMIDE 50 MG: 10 INJECTION, SOLUTION INTRAVENOUS at 14:44

## 2020-12-24 RX ADMIN — HYDROMORPHONE HYDROCHLORIDE 1 MG: 2 INJECTION, SOLUTION INTRAMUSCULAR; INTRAVENOUS; SUBCUTANEOUS at 14:59

## 2020-12-24 RX ADMIN — LIDOCAINE HYDROCHLORIDE 0.5 ML: 10 INJECTION, SOLUTION EPIDURAL; INFILTRATION; INTRACAUDAL at 14:28

## 2020-12-24 RX ADMIN — SUGAMMADEX 200 MG: 100 INJECTION, SOLUTION INTRAVENOUS at 15:49

## 2020-12-24 RX ADMIN — SODIUM CHLORIDE, POTASSIUM CHLORIDE, SODIUM LACTATE AND CALCIUM CHLORIDE: 600; 310; 30; 20 INJECTION, SOLUTION INTRAVENOUS at 14:27

## 2020-12-24 RX ADMIN — DEXAMETHASONE SODIUM PHOSPHATE 1.6 MG: 4 INJECTION, SOLUTION INTRA-ARTICULAR; INTRALESIONAL; INTRAMUSCULAR; INTRAVENOUS; SOFT TISSUE at 14:48

## 2020-12-24 RX ADMIN — FAMOTIDINE 20 MG: 10 INJECTION INTRAVENOUS at 20:24

## 2020-12-24 RX ADMIN — FENTANYL CITRATE 25 MCG: 50 INJECTION, SOLUTION INTRAMUSCULAR; INTRAVENOUS at 19:30

## 2020-12-24 RX ADMIN — ACETAMINOPHEN 1000 MG: 10 INJECTION, SOLUTION INTRAVENOUS at 20:23

## 2020-12-24 RX ADMIN — ACETAMINOPHEN 1000 MG: 500 TABLET ORAL at 14:28

## 2020-12-24 RX ADMIN — ALPRAZOLAM 0.5 MG: 0.25 TABLET ORAL at 13:58

## 2020-12-24 RX ADMIN — MIDAZOLAM HYDROCHLORIDE 2 MG: 1 INJECTION, SOLUTION INTRAMUSCULAR; INTRAVENOUS at 14:40

## 2020-12-24 RX ADMIN — POTASSIUM CHLORIDE: 149 INJECTION, SOLUTION, CONCENTRATE INTRAVENOUS at 20:23

## 2020-12-24 RX ADMIN — GABAPENTIN 300 MG: 300 CAPSULE ORAL at 20:52

## 2020-12-24 RX ADMIN — FENTANYL CITRATE 25 MCG: 50 INJECTION, SOLUTION INTRAMUSCULAR; INTRAVENOUS at 18:57

## 2020-12-24 RX ADMIN — OXYCODONE HYDROCHLORIDE 10 MG: 10 TABLET, FILM COATED, EXTENDED RELEASE ORAL at 14:27

## 2020-12-24 RX ADMIN — POVIDONE IODINE 15 ML: 100 SOLUTION TOPICAL at 14:20

## 2020-12-24 RX ADMIN — CEFOTETAN DISODIUM 2 G: 2 INJECTION, POWDER, FOR SOLUTION INTRAMUSCULAR; INTRAVENOUS at 14:40

## 2020-12-24 RX ADMIN — Medication: at 20:01

## 2020-12-24 RX ADMIN — PROPOFOL 150 MG: 10 INJECTION, EMULSION INTRAVENOUS at 14:44

## 2020-12-24 RX ADMIN — FENTANYL CITRATE 25 MCG: 50 INJECTION, SOLUTION INTRAMUSCULAR; INTRAVENOUS at 18:55

## 2020-12-24 RX ADMIN — HALOPERIDOL LACTATE 1 MG: 5 INJECTION, SOLUTION INTRAMUSCULAR at 20:24

## 2020-12-24 RX ADMIN — DEXMEDETOMIDINE HYDROCHLORIDE 50 MCG: 100 INJECTION, SOLUTION INTRAVENOUS at 14:44

## 2020-12-24 RX ADMIN — FENTANYL CITRATE 25 MCG: 50 INJECTION, SOLUTION INTRAMUSCULAR; INTRAVENOUS at 19:20

## 2020-12-24 RX ADMIN — MAGNESIUM SULFATE HEPTAHYDRATE 3 G: 500 INJECTION, SOLUTION INTRAMUSCULAR; INTRAVENOUS at 15:00

## 2020-12-24 RX ADMIN — SODIUM CHLORIDE, SODIUM GLUCONATE, SODIUM ACETATE, POTASSIUM CHLORIDE AND MAGNESIUM CHLORIDE: 526; 502; 368; 37; 30 INJECTION, SOLUTION INTRAVENOUS at 15:18

## 2020-12-24 RX ADMIN — DEXAMETHASONE SODIUM PHOSPHATE 1.6 MG: 4 INJECTION, SOLUTION INTRA-ARTICULAR; INTRALESIONAL; INTRAMUSCULAR; INTRAVENOUS; SOFT TISSUE at 14:50

## 2020-12-24 SDOH — ECONOMIC STABILITY: TRANSPORTATION INSECURITY
IN THE PAST 12 MONTHS, HAS LACK OF TRANSPORTATION KEPT YOU FROM MEETINGS, WORK, OR FROM GETTING THINGS NEEDED FOR DAILY LIVING?: NO

## 2020-12-24 SDOH — ECONOMIC STABILITY: TRANSPORTATION INSECURITY
IN THE PAST 12 MONTHS, HAS THE LACK OF TRANSPORTATION KEPT YOU FROM MEDICAL APPOINTMENTS OR FROM GETTING MEDICATIONS?: NO

## 2020-12-24 SDOH — ECONOMIC STABILITY: FOOD INSECURITY: WITHIN THE PAST 12 MONTHS, YOU WORRIED THAT YOUR FOOD WOULD RUN OUT BEFORE YOU GOT MONEY TO BUY MORE.: NEVER TRUE

## 2020-12-24 SDOH — ECONOMIC STABILITY: FOOD INSECURITY: WITHIN THE PAST 12 MONTHS, THE FOOD YOU BOUGHT JUST DIDN'T LAST AND YOU DIDN'T HAVE MONEY TO GET MORE.: NEVER TRUE

## 2020-12-24 ASSESSMENT — PAIN DESCRIPTION - PAIN TYPE
TYPE: ACUTE PAIN;SURGICAL PAIN
TYPE: ACUTE PAIN;SURGICAL PAIN
TYPE: ACUTE PAIN

## 2020-12-24 ASSESSMENT — LIFESTYLE VARIABLES
ON A TYPICAL DAY WHEN YOU DRINK ALCOHOL HOW MANY DRINKS DO YOU HAVE: 0
EVER FELT BAD OR GUILTY ABOUT YOUR DRINKING: NO
CONSUMPTION TOTAL: NEGATIVE
HAVE PEOPLE ANNOYED YOU BY CRITICIZING YOUR DRINKING: NO
HAVE YOU EVER FELT YOU SHOULD CUT DOWN ON YOUR DRINKING: NO
EVER HAD A DRINK FIRST THING IN THE MORNING TO STEADY YOUR NERVES TO GET RID OF A HANGOVER: NO
AVERAGE NUMBER OF DAYS PER WEEK YOU HAVE A DRINK CONTAINING ALCOHOL: 0
HOW MANY TIMES IN THE PAST YEAR HAVE YOU HAD 5 OR MORE DRINKS IN A DAY: 0
TOTAL SCORE: 0
DOES PATIENT WANT TO STOP DRINKING: NO
ALCOHOL_USE: NO

## 2020-12-24 ASSESSMENT — PATIENT HEALTH QUESTIONNAIRE - PHQ9
1. LITTLE INTEREST OR PLEASURE IN DOING THINGS: NOT AT ALL
SUM OF ALL RESPONSES TO PHQ9 QUESTIONS 1 AND 2: 0
2. FEELING DOWN, DEPRESSED, IRRITABLE, OR HOPELESS: NOT AT ALL

## 2020-12-24 ASSESSMENT — PAIN SCALES - GENERAL: PAIN_LEVEL: 2

## 2020-12-24 ASSESSMENT — FIBROSIS 4 INDEX: FIB4 SCORE: 1.15

## 2020-12-24 NOTE — PROGRESS NOTES
Pre op checklist completed. IV placed, ,medications given. Belongings secured. FSBS check, 83. All questions answered at this time, call light within reach.

## 2020-12-24 NOTE — ANESTHESIA PROCEDURE NOTES
Airway    Date/Time: 12/24/2020 2:45 PM  Performed by: Tyson Bone M.D.  Authorized by: Tyson Bone M.D.     Location:  OR  Urgency:  Elective  Difficult Airway: No    Indications for Airway Management:  Anesthesia      Spontaneous Ventilation: absent    Sedation Level:  Deep  Preoxygenated: Yes    Patient Position:  Sniffing  Mask Difficulty Assessment:  1 - vent by mask  Final Airway Type:  Endotracheal airway  Final Endotracheal Airway:  ETT  Cuffed: Yes    Technique Used for Successful ETT Placement:  Direct laryngoscopy    Insertion Site:  Oral  Blade Type:  Gilmore  Laryngoscope Blade/Videolaryngoscope Blade Size:  2  ETT Size (mm):  6.5  Measured from:  Lips  ETT to Lips (cm):  20  Placement Verified by: auscultation and capnometry    Cormack-Lehane Classification:  Grade I - full view of glottis  Number of Attempts at Approach:  1

## 2020-12-24 NOTE — ANESTHESIA PREPROCEDURE EVALUATION
Anxiety, chronic pain, prior hx of bariatric surgery. Denies: MI/CHF/smoking/CVA/DM/CKD      Relevant Problems   No relevant active problems       Physical Exam    Airway   Mallampati: II  TM distance: >3 FB  Neck ROM: full       Cardiovascular - normal exam  Rhythm: regular  Rate: normal  (-) murmur     Dental - normal exam           Pulmonary - normal exam  Breath sounds clear to auscultation     Abdominal    Neurological - normal exam                 Anesthesia Plan    ASA 2       Plan - general and peripheral nerve block     Peripheral nerve block will be post-op pain control  Airway plan will be ETT  (Possible rectus sheath block for laparotomy pain)      Induction: intravenous    Postoperative Plan: Postoperative administration of opioids is intended.    Pertinent diagnostic labs and testing reviewed    Informed Consent:    Anesthetic plan and risks discussed with patient.    Use of blood products discussed with: patient whom consented to blood products.

## 2020-12-24 NOTE — ANESTHESIA PROCEDURE NOTES
Peripheral Block    Date/Time: 12/24/2020 2:46 PM  Performed by: Tyson Bone M.D.  Authorized by: Tyson Bone M.D.     Patient Location:  OR  Start Time:  12/24/2020 2:46 PM  End Time:  12/24/2020 2:51 PM  Reason for Block: at surgeon's request and post-op pain management ONLY    patient identified, IV checked, site marked, risks and benefits discussed, surgical consent, monitors and equipment checked, pre-op evaluation and timeout performed    Patient Position:  Supine  Prep: ChloraPrep    Monitoring:  Heart rate, continuous pulse ox and cardiac monitor  Block Region:  Trunk  Trunk - Block Type:  Rectus sheath plane block - TAP block    Laterality:  Bilateral  Procedures: ultrasound guided  Image captured, interpreted and electronically stored.  Local Infiltration:  Lidocaine  Strength:  1 %  Dose:  3 ml  Block Type:  Single-shot  Needle Length:  100mm  Needle Gauge:  21 G  Needle Localization:  Ultrasound guidance  Injection Assessment:  Negative aspiration for heme, no paresthesia on injection, incremental injection and local visualized surrounding nerve on ultrasound  Evidence of intravascular injection: No     US Guided Rectus Sheath Block (modification of TAP Block)   US probe placed at the midclavicular lineanterior axillary line at level of umbilicus in an axial plane. Rectus Abdominus, External Oblique, Internal Oblique (IO) and Transversis Abdominis (TA) muscles identified.  Needle inserted anteromedial to probe in an in plane approach and advanced under direct visualization to the posterior border of the rectus abdominus, just superior to posterior rectus sheath. After negative aspiration LA injected with ease and visualized spreading in plane between rectus abdominus muscle and posterior rectus sheath.

## 2020-12-25 PROBLEM — K31.6 GASTRIC FISTULA: Status: ACTIVE | Noted: 2020-12-25

## 2020-12-25 PROBLEM — J44.9 COPD (CHRONIC OBSTRUCTIVE PULMONARY DISEASE) (HCC): Status: ACTIVE | Noted: 2020-12-25

## 2020-12-25 LAB
ALBUMIN SERPL BCP-MCNC: 3.4 G/DL (ref 3.2–4.9)
ALBUMIN/GLOB SERPL: 2 G/DL
ALP SERPL-CCNC: 34 U/L (ref 30–99)
ALT SERPL-CCNC: 26 U/L (ref 2–50)
ANION GAP SERPL CALC-SCNC: 8 MMOL/L (ref 7–16)
AST SERPL-CCNC: 28 U/L (ref 12–45)
BILIRUB SERPL-MCNC: 0.7 MG/DL (ref 0.1–1.5)
BUN SERPL-MCNC: 7 MG/DL (ref 8–22)
CALCIUM SERPL-MCNC: 7.3 MG/DL (ref 8.5–10.5)
CHLORIDE SERPL-SCNC: 95 MMOL/L (ref 96–112)
CO2 SERPL-SCNC: 26 MMOL/L (ref 20–33)
CREAT SERPL-MCNC: 0.43 MG/DL (ref 0.5–1.4)
ERYTHROCYTE [DISTWIDTH] IN BLOOD BY AUTOMATED COUNT: 44.1 FL (ref 35.9–50)
GLOBULIN SER CALC-MCNC: 1.7 G/DL (ref 1.9–3.5)
GLUCOSE SERPL-MCNC: 144 MG/DL (ref 65–99)
HCT VFR BLD AUTO: 35.7 % (ref 37–47)
HGB BLD-MCNC: 12.2 G/DL (ref 12–16)
MCH RBC QN AUTO: 33.8 PG (ref 27–33)
MCHC RBC AUTO-ENTMCNC: 34.2 G/DL (ref 33.6–35)
MCV RBC AUTO: 98.9 FL (ref 81.4–97.8)
PLATELET # BLD AUTO: 244 K/UL (ref 164–446)
PMV BLD AUTO: 10.7 FL (ref 9–12.9)
POTASSIUM SERPL-SCNC: 4.9 MMOL/L (ref 3.6–5.5)
PROT SERPL-MCNC: 5.1 G/DL (ref 6–8.2)
RBC # BLD AUTO: 3.61 M/UL (ref 4.2–5.4)
SODIUM SERPL-SCNC: 129 MMOL/L (ref 135–145)
WBC # BLD AUTO: 7.1 K/UL (ref 4.8–10.8)

## 2020-12-25 PROCEDURE — 700105 HCHG RX REV CODE 258: Performed by: NURSE PRACTITIONER

## 2020-12-25 PROCEDURE — 770006 HCHG ROOM/CARE - MED/SURG/GYN SEMI*

## 2020-12-25 PROCEDURE — 99233 SBSQ HOSP IP/OBS HIGH 50: CPT | Performed by: INTERNAL MEDICINE

## 2020-12-25 PROCEDURE — A9270 NON-COVERED ITEM OR SERVICE: HCPCS | Performed by: NURSE PRACTITIONER

## 2020-12-25 PROCEDURE — 700102 HCHG RX REV CODE 250 W/ 637 OVERRIDE(OP): Performed by: NURSE PRACTITIONER

## 2020-12-25 PROCEDURE — 700101 HCHG RX REV CODE 250: Performed by: NURSE PRACTITIONER

## 2020-12-25 PROCEDURE — A9270 NON-COVERED ITEM OR SERVICE: HCPCS | Performed by: INTERNAL MEDICINE

## 2020-12-25 PROCEDURE — 80053 COMPREHEN METABOLIC PANEL: CPT

## 2020-12-25 PROCEDURE — 36415 COLL VENOUS BLD VENIPUNCTURE: CPT

## 2020-12-25 PROCEDURE — 85027 COMPLETE CBC AUTOMATED: CPT

## 2020-12-25 PROCEDURE — 700111 HCHG RX REV CODE 636 W/ 250 OVERRIDE (IP): Performed by: NURSE PRACTITIONER

## 2020-12-25 PROCEDURE — 700102 HCHG RX REV CODE 250 W/ 637 OVERRIDE(OP): Performed by: INTERNAL MEDICINE

## 2020-12-25 RX ORDER — ALPRAZOLAM 0.5 MG/1
0.5 TABLET ORAL 2 TIMES DAILY PRN
Status: DISCONTINUED | OUTPATIENT
Start: 2020-12-25 | End: 2020-12-25

## 2020-12-25 RX ORDER — ALPRAZOLAM 0.5 MG/1
0.5 TABLET ORAL 4 TIMES DAILY PRN
Status: DISCONTINUED | OUTPATIENT
Start: 2020-12-25 | End: 2020-12-29 | Stop reason: HOSPADM

## 2020-12-25 RX ORDER — SODIUM CHLORIDE AND POTASSIUM CHLORIDE 150; 900 MG/100ML; MG/100ML
INJECTION, SOLUTION INTRAVENOUS CONTINUOUS
Status: DISCONTINUED | OUTPATIENT
Start: 2020-12-25 | End: 2020-12-27

## 2020-12-25 RX ADMIN — POTASSIUM CHLORIDE AND SODIUM CHLORIDE: 900; 150 INJECTION, SOLUTION INTRAVENOUS at 13:42

## 2020-12-25 RX ADMIN — ACETAMINOPHEN 1000 MG: 500 TABLET ORAL at 05:32

## 2020-12-25 RX ADMIN — ACETAMINOPHEN 1000 MG: 10 INJECTION, SOLUTION INTRAVENOUS at 00:31

## 2020-12-25 RX ADMIN — FAMOTIDINE 20 MG: 10 INJECTION INTRAVENOUS at 17:56

## 2020-12-25 RX ADMIN — ALPRAZOLAM 0.5 MG: 0.5 TABLET ORAL at 15:48

## 2020-12-25 RX ADMIN — ONDANSETRON 4 MG: 2 INJECTION INTRAMUSCULAR; INTRAVENOUS at 17:56

## 2020-12-25 RX ADMIN — GABAPENTIN 300 MG: 300 CAPSULE ORAL at 17:56

## 2020-12-25 RX ADMIN — POTASSIUM CHLORIDE: 149 INJECTION, SOLUTION, CONCENTRATE INTRAVENOUS at 03:57

## 2020-12-25 RX ADMIN — POTASSIUM CHLORIDE AND SODIUM CHLORIDE: 900; 150 INJECTION, SOLUTION INTRAVENOUS at 23:48

## 2020-12-25 RX ADMIN — Medication: at 13:36

## 2020-12-25 RX ADMIN — ENOXAPARIN SODIUM 40 MG: 40 INJECTION SUBCUTANEOUS at 08:07

## 2020-12-25 RX ADMIN — POTASSIUM CHLORIDE AND SODIUM CHLORIDE: 900; 150 INJECTION, SOLUTION INTRAVENOUS at 08:07

## 2020-12-25 RX ADMIN — GABAPENTIN 300 MG: 300 CAPSULE ORAL at 12:21

## 2020-12-25 RX ADMIN — ONDANSETRON 4 MG: 2 INJECTION INTRAMUSCULAR; INTRAVENOUS at 08:07

## 2020-12-25 RX ADMIN — Medication: at 03:19

## 2020-12-25 RX ADMIN — GABAPENTIN 300 MG: 300 CAPSULE ORAL at 05:32

## 2020-12-25 RX ADMIN — ENOXAPARIN SODIUM 40 MG: 40 INJECTION SUBCUTANEOUS at 20:05

## 2020-12-25 RX ADMIN — ALPRAZOLAM 0.5 MG: 0.5 TABLET ORAL at 03:51

## 2020-12-25 RX ADMIN — ACETAMINOPHEN 1000 MG: 500 TABLET ORAL at 23:43

## 2020-12-25 RX ADMIN — FAMOTIDINE 20 MG: 10 INJECTION INTRAVENOUS at 05:32

## 2020-12-25 RX ADMIN — ACETAMINOPHEN 1000 MG: 500 TABLET ORAL at 17:56

## 2020-12-25 ASSESSMENT — COGNITIVE AND FUNCTIONAL STATUS - GENERAL
MOVING TO AND FROM BED TO CHAIR: A LITTLE
TOILETING: A LITTLE
MOVING FROM LYING ON BACK TO SITTING ON SIDE OF FLAT BED: A LITTLE
PERSONAL GROOMING: A LITTLE
CLIMB 3 TO 5 STEPS WITH RAILING: A LOT
WALKING IN HOSPITAL ROOM: A LITTLE
MOBILITY SCORE: 18
SUGGESTED CMS G CODE MODIFIER MOBILITY: CK
SUGGESTED CMS G CODE MODIFIER DAILY ACTIVITY: CK
EATING MEALS: A LITTLE
HELP NEEDED FOR BATHING: A LITTLE
DRESSING REGULAR LOWER BODY CLOTHING: A LITTLE
STANDING UP FROM CHAIR USING ARMS: A LITTLE
DAILY ACTIVITIY SCORE: 19

## 2020-12-25 ASSESSMENT — ENCOUNTER SYMPTOMS
NAUSEA: 0
ABDOMINAL PAIN: 1
WEAKNESS: 1
SHORTNESS OF BREATH: 1
WEIGHT LOSS: 1
WEAKNESS: 0
HEADACHES: 0
PSYCHIATRIC NEGATIVE: 1
CARDIOVASCULAR NEGATIVE: 1
DEPRESSION: 0
SORE THROAT: 0
NERVOUS/ANXIOUS: 1
DIARRHEA: 0
EYES NEGATIVE: 1
FEVER: 0
TINGLING: 0
HEARTBURN: 0
WHEEZING: 0
CONSTIPATION: 0
SINUS PAIN: 0
RESPIRATORY NEGATIVE: 1
CONSTITUTIONAL NEGATIVE: 1
NEUROLOGICAL NEGATIVE: 1
MUSCULOSKELETAL NEGATIVE: 1
COUGH: 0
BLURRED VISION: 1
NAUSEA: 1
PALPITATIONS: 0
VOMITING: 0
CHILLS: 0
MYALGIAS: 1
DIZZINESS: 0
SHORTNESS OF BREATH: 0

## 2020-12-25 ASSESSMENT — PAIN DESCRIPTION - PAIN TYPE
TYPE: ACUTE PAIN;SURGICAL PAIN
TYPE: ACUTE PAIN;SURGICAL PAIN
TYPE: ACUTE PAIN
TYPE: ACUTE PAIN;SURGICAL PAIN
TYPE: ACUTE PAIN;SURGICAL PAIN
TYPE: ACUTE PAIN

## 2020-12-25 ASSESSMENT — COPD QUESTIONNAIRES
HAVE YOU SMOKED AT LEAST 100 CIGARETTES IN YOUR ENTIRE LIFE: NO/DON'T KNOW
DO YOU EVER COUGH UP ANY MUCUS OR PHLEGM?: NO/ONLY WITH OCCASIONAL COLDS OR INFECTIONS
COPD SCREENING SCORE: 2
DURING THE PAST 4 WEEKS HOW MUCH DID YOU FEEL SHORT OF BREATH: NONE/LITTLE OF THE TIME

## 2020-12-25 NOTE — PROGRESS NOTES
Bedside report received. Assessment completed.  Pt is A&O x4. Pt on 0.5 L NC.   Pain 4/10. Morphine PCA in use.   + nausea, medicating PRN per MAR.    - numbness, - tingling.  Midline abdominal incision with prevena wound vac. Prevena is connected to LCS per order. 250 mL out overnight.   NG to R nare to LCS. Scant output  - BM. -flatus,   Rudd d/c this morning at 0530, pt has not yet voided.   Clear liquid diet.   Pt up x1. Tolerates well.   Call light within reach. All needs met at this time. Fall Precautions and hourly rounding in place.

## 2020-12-25 NOTE — ANESTHESIA POSTPROCEDURE EVALUATION
Patient: Lachelle Hilton    Procedure Summary     Date: 12/24/20 Room / Location: [place] [id number] / SURGERY [facility]    Anesthesia Start: 1440 Anesthesia Stop: 1610    Procedure: EXCISION, INTESTINE- SMALL WITH CLOSURE OF GASTRIC FISTULA ... (Abdomen) Diagnosis: (GASTRIC FISTULA)    Surgeons: Royce Benavides M.D. Responsible Provider: Tyson Bone M.D.    Anesthesia Type: general, peripheral nerve block ASA Status: 2          Final Anesthesia Type: general, peripheral nerve block  Last vitals  BP   Blood Pressure: 120/74    Temp   37.1 °C (98.7 °F)    Pulse   Pulse: 93   Resp   (!) 22    SpO2   98 %      Anesthesia Post Evaluation    Patient location during evaluation: PACU  Patient participation: complete - patient participated  Level of consciousness: awake and alert  Pain score: 2    Airway patency: patent  Anesthetic complications: no  Cardiovascular status: hemodynamically stable  Respiratory status: acceptable  Hydration status: euvolemic    PONV: none

## 2020-12-25 NOTE — CONSULTS
"Reason for Consult:  Asked by Dr Royce Benavides M.D. to see this patient with chronic history of COPD    Patient's PCP: Linsey West D.O.    CC: Gastric Fistula    HPI:    60-year-old female with a past medical history of bariatric surgery, anxiety, chronic pain and COPD was Transferred from [facility] 12/23/2020 for Gastric Fistula repair. HPI obtained from chart review, discussion with Dr. Benavides and PACU RN as patient was somnolent from anesthesia at interview.  She underwent exploratory laparotomy, adhesiolysis, small bowel resection and new enteroenterostomy creation x2 on 12/24/2020 by Dr. Benavides. At the PACU, patient extubated and satting well on 10L non-rebreather mask with airway protector on. Internal medicine was consulted for recommendations regarding chronic COPD management.     Medications / Drug list prior to admission:  No current facility-administered medications on file prior to encounter.      Current Outpatient Medications on File Prior to Encounter   Medication Sig Dispense Refill   • ondansetron (ZOFRAN) 4 MG Tab tablet take 1 tablet by mouth every 4 hours if needed for nausea and vomiting 20 Tab 0   • SYRINGE-NEEDLE, DISP, 3 ML (BD [employer]TE[employer]RA SYRINGE) 25G X 1\" 3 ML Misc use WITH B12 10 Each 0   • omeprazole (PRILOSEC) 20 MG delayed-release capsule TAKE 1 CAPSULE BY MOUTH  DAILY 90 Cap 3   • cyanocobalamin (VITAMIN B-12) 1000 MCG/ML Solution inject 1 milliliter INTRAMUSCULAR EVERY 30 DAYS 3 mL 5   • NON SPECIFIED bariatric advantage with iron 45 mg     • fluticasone (FLONASE) 50 MCG/ACT nasal spray as needed.     • LORazepam (ATIVAN) 0.5 MG Tab      • fluticasone (FLOVENT HFA) 110 MCG/ACT Aerosol 1-2 puffs by mouth up to twice daily for cough (Patient taking differently: as needed. 1-2 puffs by mouth up to twice daily for cough) 1 Inhaler 1   • Multiple Vitamins-Minerals (EQL AIR PROTECTOR PO) Take  by mouth.     • oxyCODONE immediate-release (ROXICODONE) 5 MG Tab Take 10 mg by mouth every 6 " hours as needed.     • vitamin e (VITAMIN E) 400 UNIT Cap Take 400 Units by mouth every day.     • B Complex Vitamins (B COMPLEX 1 PO) Take  by mouth.     • betamethasone dipropionate (DIPROLENE) 0.05 % Ointment Used to affected areas of the feet for 1-2 weeks (Patient taking differently: as needed. Used to affected areas of the feet for 1-2 weeks) 1 Tube 0   • mometasone (NASONEX) 50 MCG/ACT nasal spray Spray 2 Sprays in nose every day. (Patient taking differently: Administer 2 Sprays into affected nostril(S) as needed.) 1 Inhaler 0   • benzonatate (TESSALON) 200 MG capsule Take 1 Cap by mouth 3 times a day as needed for Cough. 30 Cap 0   • tizanidine (ZANAFLEX) 4 MG Tab Take 4 mg by mouth every 6 hours as needed.     • Calcium 500-100 MG-UNIT Chew Tab Take  by mouth.         Current list of administered Medications:    Current Facility-Administered Medications:   •  lidocaine (XYLOCAINE) 1 % injection 0.5 mL, 0.5 mL, Intradermal, Once PRN, Royce Benavides M.D., 0.5 mL at 12/24/20 1428  •  lactated ringers infusion, , Intravenous, Continuous, Royce Benavides M.D., Stopped at 12/24/20 1518    Facility-Administered Medications Ordered in Other Encounters:   •  cefoTEtan (CEFOTAN) injection, , , PRN, Tyson Bone M.D., 2 g at 12/24/20 1440  •  ePHEDrine injection, , , PRN, Tyson Bone M.D., 10 mg at 12/24/20 1454  •  HYDROmorphone (DILAUDID) injection, , , PRN, Tyson Bone M.D., 1 mg at 12/24/20 1459  •  midazolam (Versed) 2 MG/2ML injection, , , PRN, Tyson Bone M.D., 2 mg at 12/24/20 1440  •  propofol (DIPRIVAN) injection, , , PRN, Tyson Bone M.D., 150 mg at 12/24/20 1444  •  lidocaine PF (XYLOCAINE-MPF) 2 % injection PF, , , PRN, Tyson Bone M.D., 40 mg at 12/24/20 1444  •  rocuronium (ZEMURON) injection, , , PRN, Tyson Bone M.D., 50 mg at 12/24/20 1444  •  dexamethasone (DECADRON) injection, , , PRN, Tyson Bone M.D., 8 mg at 12/24/20 1458  •   bupivacaine 0.25% (SENSORCAINE-MARCAINE) pf injection, , , PRN, Tyson Bone M.D., 25 mL at 12/24/20 1450  •  dexmedetomidine (PRECEDEX) injection, , , PRN, Tyson Bone M.D., 50 mcg at 12/24/20 1444  •  magnesium sulfate 50 % injection, , , PRN, Tyson Bone M.D., 3 g at 12/24/20 1500  •  electrolyte-A (PLASMALYTE-A) infusion, , , Intra-Op Continuous, Tyson Bone M.D., New Bag at 12/24/20 1518  •  ondansetron (ZOFRAN) syringe/vial injection, , , PRN, Tyson Bone M.D., 4 mg at 12/24/20 1548  •  sugammadex (BRIDION) injection, , , PRN, Tyson Bone M.D., 200 mg at 12/24/20 1549    Past Medical History:   Diagnosis Date   • Acid reflux    • Anxiety    • Arthritis     osteo   • Bronchitis 2016   • Cardiac arrest ([facility]) 04/18/1999    sepsis   • Coccygeal pain, chronic 2012    an employee ran into her and she fell on tailbone   • Dental disorder     implants   • Heart burn    • History of colonoscopy 07/15/2014    Normal and good for 10 years.  Done at [facility].   • History of facial fracture    • History of mammogram 6/17/16    mammo   • History of Papanicolaou smear of cervix 6/24/16    normal at [facility]   • History of pneumococcal infection 1999; 2011   • History of pneumonia     Never a smoker   • Indigestion    • Lumbar spine pain     Last epidural in early 2013, she's had trigger point injections that are somewhat helpful.   • Osteoporosis 2014    RECLAST received 9/'14, 9/'15 thru [facility]; last dexa at [place] showed T-score -3.0 total L hip   • Osteoporosis     Reclast #3 received 1/11/17 at [facility]   • Pain 2020    everyplace   • Pneumonia    • Psychiatric problem     anxiety   • Sepsis ([facility]) 01/18/1999    cardiac arrest while septic   • Weakness 2020    generalized       Past Surgical History:   Procedure Laterality Date   • PANNICULECTOMY  2001   • GASTRIC BYPASS LAPAROSCOPIC  04/1999    sabine-en-y collapsed; complications over one year after. [facility]--Dr. Anne. She  "reports having sepsis afterwards.   • TRACHEOSTOMY      closed    • OTHER ABDOMINAL SURGERY      multiple hernia       Family History   Problem Relation Age of Onset   • Heart Disease Mother 67        CHF   • Cancer Father 58        brain and lung; former smoker     Patient family history was personally reviewed, no pertinent family history to current presentation    Social History     Tobacco Use   • Smoking status: Never Smoker   • Smokeless tobacco: Never Used   Substance Use Topics   • Alcohol use: No   • Drug use: No       ALLERGIES:  Allergies   Allergen Reactions   • Nsaids      Other reaction(s): Adverse Drug Reaction  \"Bleeder\"   • Tape Rash     Tape causes blisters Wound barrier  OK       Review of systems:  Unable to obtain as patient is somnolent and not responsive secondary to recovering from anesthesia    Physical exam:  Patient Vitals for the past 24 hrs:   BP Temp Temp src Pulse Resp SpO2 Height Weight   20 1254 (!) 168/85 36.2 °C (97.1 °F) Temporal 65 18 99 % 1.499 m (4' 11\") 51.3 kg (113 lb 1.5 oz)     General: Somnolent and nonresponsive  EYES: no jaundice  HEENT: OP clear   Neck:  No JVD.   CVS: RRR. S1 + S2. No M/R/G  Resp: CTAB. No wheezing or crackles/rhonchi.  Abdomen: Soft, ND,  Skin: Grossly nothing acute no obvious rashes  Neurological: Alert, Moves all extremities, no cranial nerve defects on limited exam  Extremities:   No edema. No cyanosis.       Data:  Laboratory studies personally reviewed by me:  Recent Results (from the past 24 hour(s))   ACCU-CHEK GLUCOSE    Collection Time: 20  2:21 PM   Result Value Ref Range    Glucose - Accu-Ck 83 65 - 99 mg/dL   ECG    Collection Time: 20  2:23 PM   Result Value Ref Range    Report       [facility]    Test Date:  2020  Pt Name:    SUNDEEP ANN                Department: [facility]  MRN:        [id number]                      [facility]  Gender:     Female                       Technician: SHANNA  :     "    1960                   Requested By:ROYCE BENAVIDES  Order #:    [id number]                    Reading MD: Rony Tate MD    Measurements  Intervals                                Axis  Rate:       74                           P:          77  AL:         123                          QRS:        50  QRSD:       97                           T:          23  QT:         419  QTc:        465    Interpretive Statements  SINUS RHYTHM  PROBABLE LEFT ATRIAL ENLARGEMENT  PROLONGED QTc  No previous ECG available for comparison  Electronically Signed On 12- 14:40:09 PST by Rony Tate MD         Imaging:  No orders to display         Assessment / Plan:  #Chronic COPD  Patient still recovering from anesthesia during interview and physical  She is saturating well on 10 L non-rebreather mask w/airway protector  She did received benzodiazepine prior to surgery due to anxiety  Post-op care per primary team, general surgery  Titrate O2 as tolerable  Frequent sent to spirometry  Recommend continuing home albuterol, and inhaled corticosteroids  Internal medicine will continue to follow along      I personally discussed her case with  Dr Royce Benavides M.D.    It is my pleasure to participate in the care of Ms. Hilton.  Please do not hesitate to contact me with questions or concerns.    12/24/2020    Please note that this dictation was created using voice recognition software. There may be errors I did not discover before finalizing the note.

## 2020-12-25 NOTE — OP REPORT
DATE OF SERVICE:  12/24/2020     PREOPERATIVE DIAGNOSES:  1.  Severe nausea, vomiting and abnormal imaging with dilated viscera   exhibiting chronic obstructive behavior.  2.  Multiple previous abdominal operations including Gurinder-en-Y gastric bypass.     POSTOPERATIVE DIAGNOSES:  1.  Partially obstructed small bowel with massively dilated enteroenterostomy   anastomosis region.  2.  Intra-abdominal adhesions.  3.  Incisional hernia.     OPERATIONS PERFORMED:  1.  Exploratory laparotomy.  2.  Adhesiolysis.  3.  Small bowel resection.  4.  A new enteroenterostomy creation x2.  5.  Closure with repair of incisional hernia with use of prior mesh.     SURGEON:  Royce Benavides MD     ASSISTANT:  CYDNEY Bradford.     ANESTHESIOLOGIST:  Tyson Bone MD     INDICATIONS FOR PROCEDURE:  The patient is a 60-year-old female who has had   worsening postprandial abdominal pain and vomiting.  She has abnormal imaging   with a markedly dilated area in the upper abdomen that fills with contrast and   appears to be the source of the symptoms.  She comes today for surgical   exploration and resection.     DESCRIPTION OF PROCEDURE:  After an extensive informed consent discussion   process, the patient was brought to the operating room.  She was placed in a   supine position on the operating table.  After induction of general anesthesia   and placement of an endotracheal tube, the abdomen was prepped and draped in   the usual sterile fashion.  After administration of intravenous antibiotics, a   midline incision was made.  Dissection was carried down to the old scar and   subcutaneous tissues.  Dissection was carried down through the midline mesh.    There were two areas of swiss cheese like hernia at the superior and inferior   aspects of the mesh.  The mesh was slowly incised and great care was taken to   perform safe adhesiolysis.  This was accomplished safely without any   enterotomies.  We discovered there were fairly dense  abdominal adhesions,   especially in the upper abdomen, but then quite a lot of free quite mobile   small bowel and the distal small bowel.     We then began a very careful adhesiolysis.  In the Gurinder limb itself, this   gradually became freed without enterotomies and with only minimal serosal tear   intrinsic to the procedure.     The nasogastric tube was advanced and we were able to trace its path through   the small gastric pouch and into the Gurinder limb.  We then continued our   adhesiolysis and found this was a retrocolic antegastric Gurinder limb.  The   nasogastric tube was advanced down to the intra-abdominal portion.  The Gurinder   limb was nondilated.  It measured approximately 100 cm and then led into the   small bowel anastomotic region, which was massively dilated.  The distal bowel   was decompressed and was normal.  While there was no acute obvious   obstruction right now, one could see some chronic scar tissue and   confirmation, no changes possible of this post-anastomotic area of small   bowel, which would result in chronic intermittent obstruction and this gradual   marked dilatation.  The dilatation of the anastomotic region became much less   pronounced as one moved proximally up to the ligament of Treitz where it was   basically normal.  A careful examination of the stomach remnant demonstrated   no fistula and no dilatation of the stomach remnant.  It was clear that this   baggy dysfunctional massively dilated anastomosis area would have to be   removed and that the small bowel path would have to be created once again to   eliminate the obstructive symptoms.     The biliopancreatic limb was then isolated just as it entered the anastomosis   and here the bowel wall was isolated and divided with a ZIA stapler.    Likewise, the Gurinder limb was freed as it entered the anastomotic area and it   too was divided with the ZIA-75 mm stapler.  Lastly, the common channel was   divided just as it exited the  anastomotic area.  We then used the LigaSure   device to divide the mesenteric tissues to this dilated small bowel area as   well as some Vicryl tie pedicles.  The dilated anastomotic bowel was passed   off the field for pathology.     The reconstruction was done with two anastomoses.  First, the biliopancreatic   limb was reanastomosed to the common channel.  This was accomplished by   approximating the limbs side to side with 3-0 Vicryl suture.  The enterotomies   were made and the ZIA-75 mm stapler was used to create the anastomosis, which   was inspected and found to be widely patent with excellent blood supply and   hemostasis.  The defect was closed with a TA-60 stapler and the mesenteric   defect was closed with 3-0 Vicryl sutures.  Careful examination and testing   showed no signs of extravasation leakage, weakness or ischemia.  The original   biliopancreatic limb was fairly short, measuring perhaps 25-30 cm from the   ligament of Treitz.  We now added a comfortable additional 25-30 cm and here   brought the Gurinder limb side to side in a very comfortable position.  The 3-0   Vicryl sutures were used to approximate the bowel.  The enterotomies were made   and the new anastomosis was created with a ZIA-80 stapler.  The defect was   closed with a TA-60 stapler so as to avoid any luminal narrowing.  The   mesenteric defect was closed with 3-0 Vicryl suture.  The anastomosis was   tested and found to be widely patent with excellent blood supply and   hemostasis and without any signs of ischemia, tension or other disadvantage.    Copious irrigation was now used to washout the abdomen and pelvis.  Careful   reinspection demonstrated no enterotomies and no untoward events.  The   Seprafilm was placed in all the areas of the abdomen and over the viscera.    The abdominal midline was closed with #2 Vicryl suture, reclosing the old   midline mesh and closing a swiss cheese type hernia areas with wide fascial   and mesh  bites.  The wound was vigorously irrigated and then closed with skin   staples.  A Prevena negative pressure dressing was then applied.     Needle, sponge and instrument counts were correct at the end of the case.     ESTIMATED BLOOD LOSS:  Less than 30 mL.     COMPLICATIONS:  None.        ______________________________  MD JON GREEN/HALEIGH/LIZETH    DD:  12/24/2020 16:12  DT:  12/24/2020 16:51    Job#:  [id number]

## 2020-12-25 NOTE — OR NURSING
Stat CBC drawn; original pervena full at 1815 and new one placed and that filled immediately with increasing bloody drainage; APRN aware. call placed to Dr. Benavides. Pt VS HR 86, BP 97/57

## 2020-12-25 NOTE — NON-PROVIDER
History & Physical Note    Date of Service  12/25/2020    Primary Care Physician  Linsey West D.O.    Consultants  GI    Code Status  Full Code    Chief Complaint  Unrelenting abdominal pain      History of Presenting Illness  60 y.o. female who presented 12/24/2020 with unrelenting abdominal pain since August 2020. Pt reports pain located on left side, non radiating, constant, 10/10. Pt has tried a variety of pain medications without relief. Pt said pain is worse when she lays on her left side and slightly relieved when she lays on her back. Pt reports loss of appetite  attributed to abdominal pain resulting in 25lb weight loss. Additionally pt reports constant nausea unrelated to food intake. Pt reports no vomiting episodes but frequent dry heaving. Pt has extreme weakness she attributes to lack of food intake. Pt reports episodes of chills yet no fever.     Pt has a history of Gurinder-en-Y gastric bypass in 1999 that was complicated by a leakage on the old stomach in August 2020. Pt reports multiple previous abdominal operations to fix various issues with the bypass. However, I was unable able to get a clear medical history from patient.     12/24/2020  Exploratory laparotomy, adhesiolysis, small bowel resection, new enteroenterostomy creation X2, repair of incision hernia where preformed.     Review of Systems  Review of Systems   Constitutional: Positive for malaise/fatigue and weight loss (self reported: 25lbs).   HENT: Negative for congestion, sinus pain and sore throat.    Eyes: Positive for blurred vision (due to episode of high BP related to pain ).   Respiratory: Positive for shortness of breath (pt attributes to weakness). Negative for cough.    Cardiovascular: Negative for chest pain, palpitations and leg swelling.   Gastrointestinal: Positive for abdominal pain (left sided) and nausea. Negative for constipation, diarrhea and heartburn.   Genitourinary: Negative for dysuria.   Musculoskeletal:  "Positive for myalgias (pt reports \"my whole body aches\").   Skin: Negative for rash.   Neurological: Positive for weakness (pt attributes to lack of food intake). Negative for dizziness, tingling and headaches.   Psychiatric/Behavioral: Negative for depression and suicidal ideas. The patient is nervous/anxious.        Past Medical History   has a past medical history of Acid reflux, Anxiety, Arthritis, Bronchitis (2016), Cardiac arrest (HCC) (04/18/1999), Coccygeal pain, chronic (2012), Dental disorder, Heart burn, History of colonoscopy (07/15/2014), History of facial fracture, History of mammogram (6/17/16), History of Papanicolaou smear of cervix (6/24/16), History of pneumococcal infection (1999; 2011), History of pneumonia, Indigestion, Lumbar spine pain, Osteoporosis (2014), Osteoporosis, Pain (2020), Pneumonia, Psychiatric problem, Sepsis (HCC) (01/18/1999), and Weakness (2020).    Surgical History   has a past surgical history that includes gastric bypass laparoscopic (04/1999); other abdominal surgery; panniculectomy (2001); tracheostomy (1999); and bowel resection (12/24/2020).     Family History  family history includes Cancer (age of onset: 58) in her father; Heart Disease (age of onset: 67) in her mother.     Social History   reports that she has never smoked. She has never used smokeless tobacco. She reports that she does not drink alcohol or use drugs.    Allergies  Allergies   Allergen Reactions   • Nsaids      Other reaction(s): Adverse Drug Reaction  \"Bleeder\"   • Tape Rash     Tape causes blisters Wound barrier  OK       Medications  Prior to Admission Medications   Prescriptions Last Dose Informant Patient Reported? Taking?   ALPRAZolam (XANAX) 0.5 MG Tab 12/23/2020 at 1900  No No   Sig: take 1-2 tablets by mouth as needed for anxiety   B Complex Vitamins (B COMPLEX 1 PO) 12/1/2020  Yes No   Sig: Take  by mouth.   COLLAGEN PO 12/16/2020  Yes No   Sig: Take  by mouth every day. Collagen complex " "  Calcium 500-100 MG-UNIT Chew Tab 2020  Yes No   Sig: Take  by mouth.   Capsicum, Cayenne, (CAYENNE PO) 2020  Yes No   Sig: Take 500 mg by mouth every day.   Cholecalciferol (VITAMIN D) 50 MCG (2000 UT) Cap 2020  Yes No   Sig: Take  by mouth every day.   LORazepam (ATIVAN) 0.5 MG Tab   Yes No   Magnesium 400 MG Tab 2020  Yes No   Sig: Take  by mouth every day.   Multiple Vitamins-Minerals (EQL AIR PROTECTOR PO) 2020  Yes No   Sig: Take  by mouth.   NON SPECIFIED   Yes No   Sig: bariatric advantage with iron 45 mg   Omega-3 Fatty Acids (FISH OIL) 1200 MG Cap 2020  Yes No   Sig: Take  by mouth every day.   SYRINGE-NEEDLE, DISP, 3 ML (BD INTEGRA SYRINGE) 25G X 1\" 3 ML Misc   No No   Sig: use WITH B12   albuterol (PROAIR HFA) 108 (90 Base) MCG/ACT Aero Soln inhalation aerosol [facility]  No No   Sig: Inhale 2 Puffs every 6 hours as needed for Shortness of Breath.   benzonatate (TESSALON) 200 MG capsule unknown  No No   Sig: Take 1 Cap by mouth 3 times a day as needed for Cough.   betamethasone dipropionate (DIPROLENE) 0.05 % Ointment not taking  No No   Sig: Used to affected areas of the feet for 1-2 weeks   Patient taking differently: as needed. Used to affected areas of the feet for 1-2 weeks   cyanocobalamin (VITAMIN B-12) 1000 MCG/ML Solution 2020  No No   Sig: inject 1 milliliter INTRAMUSCULAR EVERY 30 DAYS   fluticasone (FLONASE) 50 MCG/ACT nasal spray not taking  Yes No   Sig: as needed.   fluticasone (FLOVENT HFA) 110 MCG/ACT Aerosol not taking  No No   Si-2 puffs by mouth up to twice daily for cough   Patient taking differently: as needed. 1-2 puffs by mouth up to twice daily for cough   mometasone (NASONEX) 50 MCG/ACT nasal spray not taking  No No   Sig: Spray 2 Sprays in nose every day.   Patient taking differently: Administer 2 Sprays into affected nostril(S) as needed.   omeprazole (PRILOSEC) 20 MG delayed-release capsule   No No   Sig: TAKE 1 CAPSULE BY MOUTH  " DAILY   ondansetron (ZOFRAN) 4 MG Tab tablet 12/22/2020  No No   Sig: take 1 tablet by mouth every 4 hours if needed for nausea and vomiting   oxyCODONE immediate-release (ROXICODONE) 5 MG Tab not taking  Yes No   Sig: Take 10 mg by mouth every 6 hours as needed.   oxyCODONE-acetaminophen (PERCOCET-10)  MG Tab 12/24/2020 at 0530  Yes No   Sig: Take 1 Tab by mouth.   tizanidine (ZANAFLEX) 4 MG Tab not taking  Yes No   Sig: Take 4 mg by mouth every 6 hours as needed.   vitamin e (VITAMIN E) 400 UNIT Cap 12/16/2020  Yes No   Sig: Take 400 Units by mouth every day.      Facility-Administered Medications: None       Physical Exam  Temp:  [36.2 °C (97.1 °F)-37.1 °C (98.8 °F)] 36.6 °C (97.8 °F)  Pulse:  [] 77  Resp:  [18-31] 18  BP: ()/(57-85) 108/71  SpO2:  [91 %-100 %] 91 %    Physical Exam  Constitutional:       Appearance: Normal appearance.   HENT:      Head: Normocephalic and atraumatic.   Eyes:      Conjunctiva/sclera: Conjunctivae normal.   Cardiovascular:      Rate and Rhythm: Normal rate and regular rhythm.      Heart sounds: Normal heart sounds.   Pulmonary:      Effort: Pulmonary effort is normal.      Breath sounds: Normal breath sounds.   Abdominal:      Palpations: Abdomen is soft.      Tenderness: There is abdominal tenderness (left sided).   Musculoskeletal: Normal range of motion.         General: No swelling or tenderness.      Right lower leg: No edema.      Left lower leg: No edema.   Skin:     Coloration: Skin is pale (face and hands are pale).      Comments: Hands are cool to touch     Neurological:      General: No focal deficit present.      Mental Status: She is alert and oriented to person, place, and time.   Psychiatric:         Mood and Affect: Mood normal.         Behavior: Behavior normal.         Thought Content: Thought content normal.         Judgment: Judgment normal.         Laboratory:  Recent Labs     12/24/20  1840 12/25/20  0410   WBC 6.7 7.1   RBC 3.92* 3.61*  "  HEMOGLOBIN 13.2 12.2   HEMATOCRIT 39.0 35.7*   MCV 99.5* 98.9*   MCH 33.7* 33.8*   MCHC 33.8 34.2   RDW 44.9 44.1   PLATELETCT 295 244   MPV 10.4 10.7     Recent Labs     12/25/20  0410   SODIUM 129*   POTASSIUM 4.9   CHLORIDE 95*   CO2 26   GLUCOSE 144*   BUN 7*   CREATININE 0.43*   CALCIUM 7.3*     Recent Labs     12/25/20  0410   ALTSGPT 26   ASTSGOT 28   ALKPHOSPHAT 34   TBILIRUBIN 0.7   GLUCOSE 144*       Imaging:  From surgical report: Dr. Benavides  \" She has abnormal imaging with a markedly dilated area in the upper abdomen that fills with contrast and appears to be the source of the symptoms. \"    Assessment/Plan:  Gastric Fistula   Assessment/Plan  -Imaging was performed and indicated dilation of bowel  -Surgery was performed 12/24: exploratory laparotomy, adhesiolysis, small bowel resection and new enteroenterostomy creation x2 on  by Dr. Benavides.   Mid-line incision has wound vac  -Pt continues to have 10/10 left sided abd pain, continue with pain medication  -consult pain management if pain continues    COPD  Assessment/Plan  Pt currently on 1L  NC PM  Latest O2 sat: 91%   Continue to monitor and use albuterol as needed   Respiratory consult possible for COPD management     Anxiety   Assessment/Plan  Anxiety is currently under control   Continue medication   Pt to follow up with PCP    "

## 2020-12-25 NOTE — OR NURSING
Pt stable; CBC results fine; Dr. Grant called back; prevena wound vac to LWS until pt is evaluated.  Wound vac output 100ml.

## 2020-12-25 NOTE — ANESTHESIA TIME REPORT
Anesthesia Start and Stop Event Times     Date Time Event    12/24/2020 1358 Ready for Procedure     1440 Anesthesia Start     1610 Anesthesia Stop        Responsible Staff  12/24/20    Name Role Begin End    Tyson Bone M.D. Anesth 1440 1610        Preop Diagnosis (Free Text):  Pre-op Diagnosis     GASTRIC FISTULA        Preop Diagnosis (Codes):    Post op Diagnosis  Partial small bowel obstruction (HCC)  chronic abdominal pain and abdominal bloating    Premium Reason  A. 3PM - 7AM    Comments:

## 2020-12-25 NOTE — PROGRESS NOTES
2 Rn skin check complete    Pt has prevena dressing in place to midline abdomen, to low continuous wall suction per Dr. Grant.  Now serosanguinous drainage, on initial assessment drainage was sanguinous.  Ng tube to low wall suction on R nare, scant output at this time.  Rudd catheter in place, order to remove in AM  Skin generalized fragile.

## 2020-12-25 NOTE — PROGRESS NOTES
Pt is A&O 4, anxious, paged on call hospitalist to restart home xanax  Pain controlled with morphine PCA, pt using frequently  Slight nausea upon arrival.  None at this time  Tolerating ice chips at this time, ordered gastric clears diet  NG to low continuous suction to R nare  Incision to midline abdomen.  Covered with prevena dressing.  To low wall suction at this time per Dr. Grant. Small to moderate amount of serosanguinous drainage  + Voids per tesfaye catheter  - flatus  - BM  SCD's on  Bed alarm n/a, pt low fall risk per jayro murry  Reviewed plan of care with patient, bed in lowest position and locked, pt resting comfortably now, call light within reach, all needs met at this time. Interventions will be executed per plan of care

## 2020-12-25 NOTE — PROGRESS NOTES
Hospital Medicine Daily Progress Note    Date of Service  12/25/2020    Chief Complaint/Reason for consult:  60 y.o. female transferred from [facility] on 12/24/2020 for gastric fistula repair.  She has a history of bariatric surgery, COPD, anxiety, and chronic pain.  Medical service were consulted for COPD management    Hospital Course  Patient underwent exploratory laparotomy, adhesion lysis, small bowel resection, with a new enteroenterostomy creation x 2 on 12/24/2020.     Interval Problem Update  Currently saturating 99% on 1 LPM nasal cannula    Consultants/Specialty  Medical services on consult    Code Status  Full Code    Disposition  Home when stable    Review of Systems  Review of Systems   Constitutional: Negative.    HENT: Negative.    Eyes: Negative.    Respiratory: Negative.  Negative for shortness of breath and wheezing.    Cardiovascular: Negative.    Gastrointestinal: Negative for nausea.        Surgical pain is stable   Genitourinary: Negative.    Musculoskeletal: Negative.    Skin: Negative.    Neurological: Negative.  Negative for weakness.   Endo/Heme/Allergies: Negative.    Psychiatric/Behavioral: Negative.         Physical Exam  Temp:  [36.3 °C (97.4 °F)-37.1 °C (98.8 °F)] 36.6 °C (97.8 °F)  Pulse:  [] 77  Resp:  [18-31] 18  BP: ()/(57-82) 108/71  SpO2:  [91 %-100 %] 91 %    Physical Exam  Constitutional:       General: She is not in acute distress.  HENT:      Head: Normocephalic.      Nose:      Comments: NG tube in place     Mouth/Throat:      Mouth: Mucous membranes are moist.   Eyes:      Extraocular Movements: Extraocular movements intact.      Pupils: Pupils are equal, round, and reactive to light.   Neck:      Musculoskeletal: Normal range of motion.   Cardiovascular:      Rate and Rhythm: Normal rate.   Pulmonary:      Effort: Pulmonary effort is normal.      Breath sounds: Normal breath sounds. No wheezing.   Abdominal:      Comments: Abdominal incision is intact with  wound vac   Musculoskeletal:      Right lower leg: No edema.      Left lower leg: No edema.   Skin:     General: Skin is warm.      Comments: Abdominal incision with wound VAC   Neurological:      Mental Status: She is alert.   Psychiatric:         Mood and Affect: Mood normal.         Fluids    Intake/Output Summary (Last 24 hours) at 12/25/2020 1329  Last data filed at 12/25/2020 1200  Gross per 24 hour   Intake 3990.42 ml   Output 3485 ml   Net 505.42 ml       Laboratory  Recent Labs     12/24/20  1840 12/25/20  0410   WBC 6.7 7.1   RBC 3.92* 3.61*   HEMOGLOBIN 13.2 12.2   HEMATOCRIT 39.0 35.7*   MCV 99.5* 98.9*   MCH 33.7* 33.8*   MCHC 33.8 34.2   RDW 44.9 44.1   PLATELETCT 295 244   MPV 10.4 10.7     Recent Labs     12/25/20  0410   SODIUM 129*   POTASSIUM 4.9   CHLORIDE 95*   CO2 26   GLUCOSE 144*   BUN 7*   CREATININE 0.43*   CALCIUM 7.3*                   Imaging  No orders to display        Assessment/Plan  Gastric fistula  Assessment & Plan  POD#1 exploratory laparotomy, with adhesion lysis, small bowel resection, new enteroenterostomy creation x 2, and closure with repair of incisional hernia with use of prior mesh. Patient is being managed by General Surgery (primary service).  NG tube is in place per suction. On clear liquid diet.  Midline incision has wound VAC.  Continue management per general surgery    COPD (chronic obstructive pulmonary disease) (HCC)  Assessment & Plan  Stable.  Currently saturating 99% on 1 LPM nasal cannula.  Continue as needed albuterol.      VTE prophylaxis: Lovenox

## 2020-12-25 NOTE — PROGRESS NOTES
Surgical Progress Note    Author: GLORIA Ashby. Date & Time created: 2020   7:47 AM     Interval Events:  POD#1 1.  Exploratory laparotomy.  2.  Adhesiolysis.  3.  Small bowel resection.  4.  A new enteroenterostomy creation x2.  5.  Closure with repair of incisional hernia with use of prior mesh.    She verbalizes that she has no nausea at this time but has not had much PO intake.  NG in place to suction.  Prevena over midline incision to low wall suction with 200ml of sanguinous output.  Admits to typical incisional abdominal pain, controlled with medication. Pt is ambulating to the bathroom and voiding. Rudd dced.  Subjectively feeling well, feeling hungry.    Review of Systems   Constitutional: Negative for chills and fever.   Respiratory: Negative for cough and shortness of breath.    Cardiovascular: Negative for chest pain and palpitations.   Gastrointestinal: Positive for abdominal pain. Negative for diarrhea, heartburn, nausea and vomiting.   Genitourinary: Negative for dysuria.     Hemodynamics:  Temp (24hrs), Av.6 °C (97.9 °F), Min:36.2 °C (97.1 °F), Max:37.1 °C (98.8 °F)  Temperature: 36.8 °C (98.3 °F)  Pulse  Av.6  Min: 65  Max: 102   Blood Pressure: 104/64     Respiratory:    Respiration: 18, Pulse Oximetry: 99 %        RUL Breath Sounds: Clear, RML Breath Sounds: Clear, RLL Breath Sounds: Diminished, MARGARET Breath Sounds: Clear, LLL Breath Sounds: Diminished  Neuro:  GCS       Fluids:    Intake/Output Summary (Last 24 hours) at 2020 0747  Last data filed at 2020 0530  Gross per 24 hour   Intake 2642.5 ml   Output 3485 ml   Net -842.5 ml     Weight: 51.3 kg (113 lb 1.5 oz)  Current Diet Order   Procedures   • Diet Order Diet: Clear Liquid; Miscellaneous modifications: (optional): Bariatric     Physical Exam  Constitutional:       General: She is not in acute distress.     Appearance: She is obese.   HENT:      Head: Normocephalic and atraumatic.      Nose:       Comments: NG tube in place to suction     Mouth/Throat:      Mouth: Mucous membranes are moist.   Eyes:      Conjunctiva/sclera: Conjunctivae normal.   Neck:      Musculoskeletal: Normal range of motion and neck supple.   Cardiovascular:      Rate and Rhythm: Normal rate and regular rhythm.   Pulmonary:      Effort: Pulmonary effort is normal.   Abdominal:      General: There is no distension.      Palpations: There is no mass.      Tenderness: There is abdominal tenderness. There is no guarding or rebound.      Comments: prevena in place to low wall suction   Skin:     General: Skin is warm and dry.   Neurological:      Mental Status: She is alert and oriented to person, place, and time.   Psychiatric:         Mood and Affect: Mood normal.         Behavior: Behavior normal.         Judgment: Judgment normal.       Labs:  Recent Results (from the past 24 hour(s))   ACCU-CHEK GLUCOSE    Collection Time: 20  2:21 PM   Result Value Ref Range    Glucose - Accu-Ck 83 65 - 99 mg/dL   ECG    Collection Time: 20  2:23 PM   Result Value Ref Range    Report       [facility]    Test Date:  2020  Pt Name:    SUNDEEP ANN                Department: [facility]  MRN:        [id number]                      Room:       [facility]  Gender:     Female                       Technician: [facility]  :        1960                   Requested By:KARLI BARROSO  Order #:    [id number]                    Reading MD: Rony Tate MD    Measurements  Intervals                                Axis  Rate:       74                           P:          77  VA:         123                          QRS:        50  QRSD:       97                           T:          23  QT:         419  QTc:        465    Interpretive Statements  SINUS RHYTHM  PROBABLE LEFT ATRIAL ENLARGEMENT  PROLONGED QTc  No previous ECG available for comparison  Electronically Signed On 2020 14:40:09 PST by Rony Tate MD     Histology Request     Collection Time: 12/24/20  4:38 PM   Result Value Ref Range    Pathology Request Sent to Histo    CBC WITHOUT DIFFERENTIAL    Collection Time: 12/24/20  6:40 PM   Result Value Ref Range    WBC 6.7 4.8 - 10.8 K/uL    RBC 3.92 (L) 4.20 - 5.40 M/uL    Hemoglobin 13.2 12.0 - 16.0 g/dL    Hematocrit 39.0 37.0 - 47.0 %    MCV 99.5 (H) 81.4 - 97.8 fL    MCH 33.7 (H) 27.0 - 33.0 pg    MCHC 33.8 33.6 - 35.0 g/dL    RDW 44.9 35.9 - 50.0 fL    Platelet Count 295 164 - 446 K/uL    MPV 10.4 9.0 - 12.9 fL   CBC without Differential (blood)    Collection Time: 12/25/20  4:10 AM   Result Value Ref Range    WBC 7.1 4.8 - 10.8 K/uL    RBC 3.61 (L) 4.20 - 5.40 M/uL    Hemoglobin 12.2 12.0 - 16.0 g/dL    Hematocrit 35.7 (L) 37.0 - 47.0 %    MCV 98.9 (H) 81.4 - 97.8 fL    MCH 33.8 (H) 27.0 - 33.0 pg    MCHC 34.2 33.6 - 35.0 g/dL    RDW 44.1 35.9 - 50.0 fL    Platelet Count 244 164 - 446 K/uL    MPV 10.7 9.0 - 12.9 fL   Comp Metabolic Panel (CMP)    Collection Time: 12/25/20  4:10 AM   Result Value Ref Range    Sodium 129 (L) 135 - 145 mmol/L    Potassium 4.9 3.6 - 5.5 mmol/L    Chloride 95 (L) 96 - 112 mmol/L    Co2 26 20 - 33 mmol/L    Anion Gap 8.0 7.0 - 16.0    Glucose 144 (H) 65 - 99 mg/dL    Bun 7 (L) 8 - 22 mg/dL    Creatinine 0.43 (L) 0.50 - 1.40 mg/dL    Calcium 7.3 (L) 8.5 - 10.5 mg/dL    AST(SGOT) 28 12 - 45 U/L    ALT(SGPT) 26 2 - 50 U/L    Alkaline Phosphatase 34 30 - 99 U/L    Total Bilirubin 0.7 0.1 - 1.5 mg/dL    Albumin 3.4 3.2 - 4.9 g/dL    Total Protein 5.1 (L) 6.0 - 8.2 g/dL    Globulin 1.7 (L) 1.9 - 3.5 g/dL    A-G Ratio 2.0 g/dL   ESTIMATED GFR    Collection Time: 12/25/20  4:10 AM   Result Value Ref Range    GFR If African American >60 >60 mL/min/1.73 m 2    GFR If Non African American >60 >60 mL/min/1.73 m 2     Medical Decision Making, by Problem:  Active Hospital Problems    Diagnosis   • COPD (chronic obstructive pulmonary disease) (HCC) [J44.9]     Plan:  Pt is alert and oriented, NAD. Breathing unlabored.  Incisions ok. SBP in the 100s/60s. Labs reviewed.  Hgh 12.2 down from 13.2, Sodium 129 Will change IV hydration to NS.  Encouraged ambulation and incentive spirometry.  Wean O2 if tolerated. Continue prevena to low wall suction.  Clamp NG tube today and attempt clear diet.  Rudd DCed.  Pt also seen and examined by Dr. Benavides.    Quality Measures:  Quality-Core Measures   Reviewed items::  Labs reviewed  Rudd catheter::  No Rudd  DVT prophylaxis pharmacological::  Enoxaparin (Lovenox)  DVT prophylaxis - mechanical:  SCDs  Ulcer Prophylaxis::  Yes      Discussed patient condition with RN, Patient and CASPER Thomas

## 2020-12-25 NOTE — CARE PLAN
Problem: Communication  Goal: The ability to communicate needs accurately and effectively will improve  Outcome: PROGRESSING AS EXPECTED     Problem: Safety  Goal: Will remain free from injury  Outcome: PROGRESSING AS EXPECTED  Goal: Will remain free from falls  Outcome: PROGRESSING AS EXPECTED     Problem: Bowel/Gastric:  Goal: Normal bowel function is maintained or improved  Outcome: PROGRESSING AS EXPECTED     Problem: Knowledge Deficit  Goal: Knowledge of disease process/condition, treatment plan, diagnostic tests, and medications will improve  Outcome: PROGRESSING AS EXPECTED  Goal: Knowledge of the prescribed therapeutic regimen will improve  Outcome: PROGRESSING AS EXPECTED     Problem: Pain Management  Goal: Pain level will decrease to patient's comfort goal  Outcome: PROGRESSING AS EXPECTED     Problem: Psychosocial Needs:  Goal: Level of anxiety will decrease  Outcome: PROGRESSING SLOWER THAN EXPECTED

## 2020-12-25 NOTE — ASSESSMENT & PLAN NOTE
Stable.  Currently saturating 90-92% on room air, 99% on 1LPM nasal cannula. Baseline oxygen saturation unknown. Patient does not use home oxygen. Denies SOB. Continue supplemental oxygen to keep saturation above 92%. Continue as needed albuterol.  12/29: Patient was weaned off oxygen.  Ambulatory oxygen test was done and patient was saturating well.  Not qualifying for home oxygen at this point.

## 2020-12-25 NOTE — CARE PLAN
Problem: Knowledge Deficit  Goal: Knowledge of disease process/condition, treatment plan, diagnostic tests, and medications will improve  Outcome: PROGRESSING AS EXPECTED  Note: Updated on plan of care, educated about medications, labs, diet, and prevena     Problem: Pain Management  Goal: Pain level will decrease to patient's comfort goal  Outcome: PROGRESSING AS EXPECTED  Note: Pt educated about PCA pump and use as well as side effects of pain medication

## 2020-12-25 NOTE — ASSESSMENT & PLAN NOTE
POD #3 exploratory laparotomy, with adhesion lysis, small bowel resection, new enteroenterostomy creation x 2, and closure with repair of incisional hernia with use of prior mesh. Patient is being managed by General Surgery (primary service).  NG tube was removed 12/25/2020. On clear liquid diet.  Midline incision has wound VAC. Hgb is 8.6 today, monitor closely. Continue management per general surgery

## 2020-12-25 NOTE — PROGRESS NOTES
This RN spoke with Shoaib JUNIOR this morning about patient's high output prevena wound vac. VERNON okay with connecting prevena to regular wound vac at same amount of suction as opposed to connecting prevena to wall suction. During wound vac change, prevena foam became saturated with blood, new prevena foam and transparent film applied. Dressing now CDI. Pt educated about wound vac switch and verbalizes understanding.

## 2020-12-26 LAB
ALBUMIN SERPL BCP-MCNC: 2.9 G/DL (ref 3.2–4.9)
ALBUMIN/GLOB SERPL: 1.5 G/DL
ALP SERPL-CCNC: 35 U/L (ref 30–99)
ALT SERPL-CCNC: 17 U/L (ref 2–50)
ANION GAP SERPL CALC-SCNC: 5 MMOL/L (ref 7–16)
AST SERPL-CCNC: 26 U/L (ref 12–45)
BILIRUB SERPL-MCNC: 0.5 MG/DL (ref 0.1–1.5)
BUN SERPL-MCNC: 5 MG/DL (ref 8–22)
CA-I SERPL-SCNC: 1 MMOL/L (ref 1.1–1.3)
CALCIUM SERPL-MCNC: 6.9 MG/DL (ref 8.5–10.5)
CHLORIDE SERPL-SCNC: 101 MMOL/L (ref 96–112)
CO2 SERPL-SCNC: 25 MMOL/L (ref 20–33)
CREAT SERPL-MCNC: 0.39 MG/DL (ref 0.5–1.4)
ERYTHROCYTE [DISTWIDTH] IN BLOOD BY AUTOMATED COUNT: 50.6 FL (ref 35.9–50)
GLOBULIN SER CALC-MCNC: 1.9 G/DL (ref 1.9–3.5)
GLUCOSE SERPL-MCNC: 95 MG/DL (ref 65–99)
HCT VFR BLD AUTO: 32 % (ref 37–47)
HGB BLD-MCNC: 10.4 G/DL (ref 12–16)
MCH RBC QN AUTO: 33.8 PG (ref 27–33)
MCHC RBC AUTO-ENTMCNC: 32.5 G/DL (ref 33.6–35)
MCV RBC AUTO: 103.9 FL (ref 81.4–97.8)
PLATELET # BLD AUTO: 219 K/UL (ref 164–446)
PMV BLD AUTO: 10.8 FL (ref 9–12.9)
POTASSIUM SERPL-SCNC: 5 MMOL/L (ref 3.6–5.5)
PROT SERPL-MCNC: 4.8 G/DL (ref 6–8.2)
RBC # BLD AUTO: 3.08 M/UL (ref 4.2–5.4)
SODIUM SERPL-SCNC: 131 MMOL/L (ref 135–145)
WBC # BLD AUTO: 6.2 K/UL (ref 4.8–10.8)

## 2020-12-26 PROCEDURE — A9270 NON-COVERED ITEM OR SERVICE: HCPCS | Performed by: INTERNAL MEDICINE

## 2020-12-26 PROCEDURE — 700111 HCHG RX REV CODE 636 W/ 250 OVERRIDE (IP): Performed by: NURSE PRACTITIONER

## 2020-12-26 PROCEDURE — 770006 HCHG ROOM/CARE - MED/SURG/GYN SEMI*

## 2020-12-26 PROCEDURE — A9270 NON-COVERED ITEM OR SERVICE: HCPCS | Performed by: NURSE PRACTITIONER

## 2020-12-26 PROCEDURE — 36415 COLL VENOUS BLD VENIPUNCTURE: CPT

## 2020-12-26 PROCEDURE — 700101 HCHG RX REV CODE 250: Performed by: PHYSICIAN ASSISTANT

## 2020-12-26 PROCEDURE — 80053 COMPREHEN METABOLIC PANEL: CPT

## 2020-12-26 PROCEDURE — 99233 SBSQ HOSP IP/OBS HIGH 50: CPT | Performed by: INTERNAL MEDICINE

## 2020-12-26 PROCEDURE — 94760 N-INVAS EAR/PLS OXIMETRY 1: CPT

## 2020-12-26 PROCEDURE — 85027 COMPLETE CBC AUTOMATED: CPT

## 2020-12-26 PROCEDURE — 700102 HCHG RX REV CODE 250 W/ 637 OVERRIDE(OP): Performed by: NURSE PRACTITIONER

## 2020-12-26 PROCEDURE — 82330 ASSAY OF CALCIUM: CPT

## 2020-12-26 PROCEDURE — 700102 HCHG RX REV CODE 250 W/ 637 OVERRIDE(OP): Performed by: INTERNAL MEDICINE

## 2020-12-26 RX ADMIN — FAMOTIDINE 20 MG: 10 INJECTION INTRAVENOUS at 05:38

## 2020-12-26 RX ADMIN — Medication: at 05:26

## 2020-12-26 RX ADMIN — ENOXAPARIN SODIUM 40 MG: 40 INJECTION SUBCUTANEOUS at 22:44

## 2020-12-26 RX ADMIN — ALPRAZOLAM 0.5 MG: 0.5 TABLET ORAL at 17:44

## 2020-12-26 RX ADMIN — ENOXAPARIN SODIUM 40 MG: 40 INJECTION SUBCUTANEOUS at 10:08

## 2020-12-26 RX ADMIN — GABAPENTIN 300 MG: 300 CAPSULE ORAL at 05:38

## 2020-12-26 RX ADMIN — HALOPERIDOL LACTATE 1 MG: 5 INJECTION, SOLUTION INTRAMUSCULAR at 12:09

## 2020-12-26 RX ADMIN — ONDANSETRON 4 MG: 2 INJECTION INTRAMUSCULAR; INTRAVENOUS at 22:49

## 2020-12-26 RX ADMIN — POTASSIUM CHLORIDE AND SODIUM CHLORIDE: 900; 150 INJECTION, SOLUTION INTRAVENOUS at 17:15

## 2020-12-26 RX ADMIN — FAMOTIDINE 20 MG: 10 INJECTION INTRAVENOUS at 17:06

## 2020-12-26 RX ADMIN — GABAPENTIN 300 MG: 300 CAPSULE ORAL at 17:06

## 2020-12-26 RX ADMIN — Medication: at 17:03

## 2020-12-26 RX ADMIN — ACETAMINOPHEN 1000 MG: 500 TABLET ORAL at 05:37

## 2020-12-26 RX ADMIN — POTASSIUM CHLORIDE AND SODIUM CHLORIDE: 900; 150 INJECTION, SOLUTION INTRAVENOUS at 10:10

## 2020-12-26 RX ADMIN — ACETAMINOPHEN 1000 MG: 500 TABLET ORAL at 17:06

## 2020-12-26 RX ADMIN — ONDANSETRON 4 MG: 2 INJECTION INTRAMUSCULAR; INTRAVENOUS at 17:06

## 2020-12-26 RX ADMIN — ONDANSETRON 4 MG: 2 INJECTION INTRAMUSCULAR; INTRAVENOUS at 11:09

## 2020-12-26 RX ADMIN — ALPRAZOLAM 0.5 MG: 0.5 TABLET ORAL at 00:34

## 2020-12-26 ASSESSMENT — ENCOUNTER SYMPTOMS
PALPITATIONS: 0
NAUSEA: 0
FEVER: 0
ABDOMINAL PAIN: 1
HEARTBURN: 0
VOMITING: 0
CHILLS: 0
DIARRHEA: 0
SHORTNESS OF BREATH: 0
COUGH: 0

## 2020-12-26 ASSESSMENT — PAIN DESCRIPTION - PAIN TYPE
TYPE: ACUTE PAIN

## 2020-12-26 NOTE — DIETARY
"Nutrition services: Day 2 of admit.  Lachelle Hilton is a 60 y.o. female with admitting DX of gastric fistula.  Consult received per nutrition admit screen; recent weight loss (24-33 lbs x 3 months) and poor appetite/intake (MST score = 4).     Assessment:  Height: 149.9 cm (4' 11\")  Weight: 51.3 kg (113 lb 1.5 oz)  Body mass index is 22.84 kg/m²., BMI classification: WNL  Diet/Intake: clear liquid/bariatric; % intake - however this is only tablespoons of liquids    Evaluation:   1. Visited bedside, pt appears adequately nourished, pt was sleeping - RD was able to wake but only briefly before pt fell back asleep  2. Unable to obtain pt report of diet/wt hx at this time  3. Per chart review pt with hx of asbine-en-y bariatric surgery (done in 1999 per surgical hx on file)  4. This admit pt is s/p ex lap, adhesion lysis, small bowel resection, with new enteroenterostomy creation (12/24 - POD#2)  5. NG tube was d/c'd 12/25 and pt is tolerating clear liquid/bariatric diet order  · Spoke with RN regarding bariatric modification, which allows for only a couple tablespoons of each liquid - RN reports plan to verify with surgical team today and will adjust diet order as indicated   6. Weight hx via chart review:   · 7/21/20 = 49.9 kg (110 lb)  · 10/13/20 = 49.7 kg (110 lb)  · Current weight = 51.3 kg (113 lb)  · Weight has been stable   7. Labs: Na 131, BUN 5, creat 0.39, Ca 6.9, ionized Ca 1.0  8. MAR: pepcid, IVF @ 100 ml/hr  9. Skin: wound vac in place to abdominal surgical wound    Malnutrition Risk: Unable to obtain pt report of diet hx, however does not appear to meet malnutrition criteria at this time.     Recommendations/Plan:  1. Advance diet beyond clear liquids as medically feasible   2. Encourage intake of meals  3. Document intake of all meals as % taken in ADL's to provide interdisciplinary communication across all shifts.   4. Monitor weight.  5. Nutrition rep will continue to see patient for ongoing " meal and snack preferences.   6. RD to monitor for diet advancement, PO intake, wt trends, and nutrition labs/meds    RD to follow

## 2020-12-26 NOTE — PROGRESS NOTES
[facility] Medicine Daily Progress Note    Date of Service  12/26/2020    Chief Complaint/Reason for consult:  60 y.o. female transferred from [facility] on 12/24/2020 for gastric fistula repair.  She has a history of bariatric surgery, COPD, anxiety, and chronic pain.  Medical service were consulted for COPD management     Hospital Course  Patient underwent exploratory laparotomy, adhesion lysis, small bowel resection, with a new enteroenterostomy creation x 2 on 12/24/2020.      Interval Problem Update  Currently saturating 99% on 1 LPM nasal cannula.  NG tube was removed on 12/25/2020.  Tolerating p.o. intake.     Consultants/Specialty  Medical services on consult     Code Status  Full Code     Disposition  Home when stable     Review of Systems  Review of Systems   Constitutional: Negative.    HENT: Negative.    Eyes: Negative.    Respiratory: Negative.  Negative for shortness of breath and wheezing.    Cardiovascular: Negative.    Gastrointestinal: Negative for nausea.        Surgical pain is stable   Genitourinary: Negative.    Musculoskeletal: Negative.    Skin: Negative.    Neurological: Negative.  Negative for weakness.   Endo/Heme/Allergies: Negative.    Psychiatric/Behavioral: Negative.       Physical Exam  Temp:  [36.3 °C (97.4 °F)-37.2 °C (98.9 °F)] 37.1 °C (98.7 °F)  Pulse:  [] 86  Resp:  [16-18] 18  BP: ()/(52-75) 113/63  SpO2:  [93 %-100 %] 99 %      Physical Exam  Constitutional:       General: She is not in acute distress.  HENT:      Head: Normocephalic.      Nose:      Mouth: Mucous membranes are moist.   Eyes:      Extraocular Movements: Extraocular movements intact.      Pupils: Pupils are equal, round, and reactive to light.   Neck:      Musculoskeletal: Normal range of motion.   Cardiovascular:      Rate and Rhythm: Normal rate.   Pulmonary:      Effort: Pulmonary effort is normal.      Breath sounds: Normal breath sounds. No wheezing.   Abdominal:      Comments: Abdominal  incision is intact with wound vac   Musculoskeletal:      Right lower leg: No edema.      Left lower leg: No edema.   Skin:     General: Skin is warm.      Comments: Abdominal incision with wound VAC   Neurological:      Mental Status: She is alert.   Psychiatric:         Mood and Affect: Mood normal.     Fluids    Intake/Output Summary (Last 24 hours) at 12/26/2020 0932  Last data filed at 12/26/2020 0758  Gross per 24 hour   Intake 2313.22 ml   Output 2625 ml   Net -311.78 ml       Laboratory  Recent Labs     12/24/20  1840 12/25/20  0410 12/26/20  0430   WBC 6.7 7.1 6.2   RBC 3.92* 3.61* 3.08*   HEMOGLOBIN 13.2 12.2 10.4*   HEMATOCRIT 39.0 35.7* 32.0*   MCV 99.5* 98.9* 103.9*   MCH 33.7* 33.8* 33.8*   MCHC 33.8 34.2 32.5*   RDW 44.9 44.1 50.6*   PLATELETCT 295 244 219   MPV 10.4 10.7 10.8     Recent Labs     12/25/20  0410 12/26/20  0430   SODIUM 129* 131*   POTASSIUM 4.9 5.0   CHLORIDE 95* 101   CO2 26 25   GLUCOSE 144* 95   BUN 7* 5*   CREATININE 0.43* 0.39*   CALCIUM 7.3* 6.9*                   Imaging  No orders to display        Assessment/Plan  Gastric fistula  Assessment & Plan  POD #2 exploratory laparotomy, with adhesion lysis, small bowel resection, new enteroenterostomy creation x 2, and closure with repair of incisional hernia with use of prior mesh. Patient is being managed by General Surgery (primary service).  NG tube removed 12/25/2020. On clear liquid diet.  Midline incision has wound VAC.  Continue management per general surgery    COPD (chronic obstructive pulmonary disease) (HCC)  Assessment & Plan  Stable.  Currently saturating 99% on 1 LPM nasal cannula.  Continue as needed albuterol.         VTE prophylaxis: Lovenox

## 2020-12-26 NOTE — CARE PLAN
Problem: Nutritional:  Goal: Achieve adequate nutritional intake  Description: Diet will advance beyond clear liquids and pt will consume > 50% of meals.   Outcome: NOT MET   See dietary note. RD following.

## 2020-12-26 NOTE — PROGRESS NOTES
Bedside report received. Assessment completed.  Pt is A&O x4. Pt on room air while awake. Pt requiring 1 L O2 while sleeping. Pt educated on use of IS.   Pain 6/10. Morphine PCA in use.    - numbness, - tingling.  Midline abdominal incision with prevena wound vac. 150 mL of serosanguinous output out overnight.   - BM, +flatus, +void  Clear liquid diet. Tolerates well, no complaints of nausea.   Pt up x1. Tolerates well.   Call light within reach. All needs met at this time. Fall Precautions and hourly rounding in place.

## 2020-12-26 NOTE — PROGRESS NOTES
Pt is A&O 4, very lethargic after xanax dose this PM, still able to arouse, but difficult  Pain controlled with morphine PCA, pt using frequently  Slight nausea upon arrival.  None at this time  Tolerating gastric clears diet, crushing all meds in water  Incision to midline abdomen.  Covered with prevena dressing.  To continuous -125mmHg suction per wound vac, moderate serosanguinous drainage  + Voids  + flatus  - BM  SCD's on  Pt assist x1 with FWW to bathroom  Bed alarm n/a, pt low fall risk per jayro murry  Reviewed plan of care with patient, bed in lowest position and locked, pt resting comfortably now, call light within reach, all needs met at this time. Interventions will be executed per plan of care

## 2020-12-26 NOTE — PROGRESS NOTES
Surgical Progress Note    Author: Bebeto Lopez P.A.-C. Date & Time created: 2020   9:26 AM     Interval Events:  POD#2 1.  Exploratory laparotomy.  2.  Adhesiolysis.  3.  Small bowel resection.  4.  A new enteroenterostomy creation x2.  5.  Closure with repair of incisional hernia with use of prior mesh.     Patient is doing well today. Pain and nausea mostly controlled. Per nursing staff she is requesting ativan for pain. She seems to become hypotensive with currently scheduled morphine and needed to be aroused. NGT discontinued and tolerating CLD with minimal nausea no vomiting. Prevena over midline incision to low wall suction with 150cc of sanguinous output.  Admits to typical incisional abdominal pain, controlled with medication. Pt is ambulating to the bathroom and voiding. Rudd dced.  Subjectively feeling better.    Review of Systems   Constitutional: Negative for chills and fever.   Respiratory: Negative for cough and shortness of breath.    Cardiovascular: Negative for chest pain and palpitations.   Gastrointestinal: Positive for abdominal pain. Negative for diarrhea, heartburn, nausea and vomiting.   Genitourinary: Negative for dysuria.     Hemodynamics:  Temp (24hrs), Av.8 °C (98.3 °F), Min:36.3 °C (97.4 °F), Max:37.2 °C (98.9 °F)  Temperature: 37.1 °C (98.7 °F)  Pulse  Av  Min: 65  Max: 103   Blood Pressure: 113/63     Respiratory:    Respiration: 18, Pulse Oximetry: 99 %        RUL Breath Sounds: Clear, RML Breath Sounds: Clear, RLL Breath Sounds: Diminished, MARGARET Breath Sounds: Clear, LLL Breath Sounds: Diminished  Neuro:  GCS       Fluids:    Intake/Output Summary (Last 24 hours) at 2020 0926  Last data filed at 2020 0758  Gross per 24 hour   Intake 2313.22 ml   Output 2625 ml   Net -311.78 ml        Current Diet Order   Procedures   • Diet Order Diet: Clear Liquid; Miscellaneous modifications: (optional): Bariatric     Physical Exam  Constitutional:       General: She  is not in acute distress.  HENT:      Head: Normocephalic and atraumatic.      Nose:      Comments: NG tube DC'd     Mouth/Throat:      Mouth: Mucous membranes are moist.   Eyes:      Conjunctiva/sclera: Conjunctivae normal.   Neck:      Musculoskeletal: Normal range of motion and neck supple.   Cardiovascular:      Rate and Rhythm: Normal rate and regular rhythm.   Pulmonary:      Effort: Pulmonary effort is normal.   Abdominal:      General: There is no distension.      Palpations: There is no mass.      Tenderness: There is abdominal tenderness. There is no guarding or rebound.      Comments: prevena in place to low wall suction 150cc sanguinous output over 24hrs   Skin:     General: Skin is warm and dry.   Neurological:      Mental Status: She is alert and oriented to person, place, and time.   Psychiatric:         Mood and Affect: Mood normal.         Behavior: Behavior normal.         Judgment: Judgment normal.       Labs:  Recent Results (from the past 24 hour(s))   CBC without Differential (blood)    Collection Time: 12/26/20  4:30 AM   Result Value Ref Range    WBC 6.2 4.8 - 10.8 K/uL    RBC 3.08 (L) 4.20 - 5.40 M/uL    Hemoglobin 10.4 (L) 12.0 - 16.0 g/dL    Hematocrit 32.0 (L) 37.0 - 47.0 %    .9 (H) 81.4 - 97.8 fL    MCH 33.8 (H) 27.0 - 33.0 pg    MCHC 32.5 (L) 33.6 - 35.0 g/dL    RDW 50.6 (H) 35.9 - 50.0 fL    Platelet Count 219 164 - 446 K/uL    MPV 10.8 9.0 - 12.9 fL   Comp Metabolic Panel (CMP)    Collection Time: 12/26/20  4:30 AM   Result Value Ref Range    Sodium 131 (L) 135 - 145 mmol/L    Potassium 5.0 3.6 - 5.5 mmol/L    Chloride 101 96 - 112 mmol/L    Co2 25 20 - 33 mmol/L    Anion Gap 5.0 (L) 7.0 - 16.0    Glucose 95 65 - 99 mg/dL    Bun 5 (L) 8 - 22 mg/dL    Creatinine 0.39 (L) 0.50 - 1.40 mg/dL    Calcium 6.9 (LL) 8.5 - 10.5 mg/dL    AST(SGOT) 26 12 - 45 U/L    ALT(SGPT) 17 2 - 50 U/L    Alkaline Phosphatase 35 30 - 99 U/L    Total Bilirubin 0.5 0.1 - 1.5 mg/dL    Albumin 2.9 (L) 3.2  - 4.9 g/dL    Total Protein 4.8 (L) 6.0 - 8.2 g/dL    Globulin 1.9 1.9 - 3.5 g/dL    A-G Ratio 1.5 g/dL   ESTIMATED GFR    Collection Time: 12/26/20  4:30 AM   Result Value Ref Range    GFR If African American >60 >60 mL/min/1.73 m 2    GFR If Non African American >60 >60 mL/min/1.73 m 2     Medical Decision Making, by Problem:  Active Hospital Problems    Diagnosis   • COPD (chronic obstructive pulmonary disease) (HCC) [J44.9]   • Gastric fistula [K31.6]     Plan:    Pt is alert and oriented, NAD. Breathing unlabored. Incisions ok. VSS. Labs reviewed.  Hgh down 10.4 from 12.2, Sodium 131 up from 129 with NS.    Pain appears to be mostly controlled. Plan to DC PCA and begin PO analgesics as tolerated tomorrow.   Encouraged ambulation and incentive spirometry. Wean O2 if tolerated.  Continue prevena to low wall suction and monitor output. NGT discontinued tolerating CLD. Tobin Walters. Pt also seen and examined by Dr. Benavides.  Will stay for today.     Quality Measures:  Quality-Core Measures    Discussed patient condition with Patient and Dr. Benavides

## 2020-12-26 NOTE — PROGRESS NOTES
Viviane from lab called with critical Ca of 6.9 at 0534.  Corrected Ca is calculated to be 7.8 for an albumin of 2.9    Notified Dr. Weldon  at 0550, received order for ionized Ca

## 2020-12-27 LAB
ABO + RH BLD: NORMAL
ABO GROUP BLD: NORMAL
ALBUMIN SERPL BCP-MCNC: 2.8 G/DL (ref 3.2–4.9)
ALBUMIN/GLOB SERPL: 1.6 G/DL
ALP SERPL-CCNC: 38 U/L (ref 30–99)
ALT SERPL-CCNC: 19 U/L (ref 2–50)
ANION GAP SERPL CALC-SCNC: 5 MMOL/L (ref 7–16)
AST SERPL-CCNC: 22 U/L (ref 12–45)
BASOPHILS # BLD AUTO: 0.3 % (ref 0–1.8)
BASOPHILS # BLD: 0.02 K/UL (ref 0–0.12)
BILIRUB SERPL-MCNC: 0.5 MG/DL (ref 0.1–1.5)
BLD GP AB SCN SERPL QL: NORMAL
BUN SERPL-MCNC: 4 MG/DL (ref 8–22)
CALCIUM SERPL-MCNC: 7 MG/DL (ref 8.5–10.5)
CHLORIDE SERPL-SCNC: 100 MMOL/L (ref 96–112)
CO2 SERPL-SCNC: 24 MMOL/L (ref 20–33)
CREAT SERPL-MCNC: 0.32 MG/DL (ref 0.5–1.4)
EOSINOPHIL # BLD AUTO: 0.07 K/UL (ref 0–0.51)
EOSINOPHIL NFR BLD: 1 % (ref 0–6.9)
ERYTHROCYTE [DISTWIDTH] IN BLOOD BY AUTOMATED COUNT: 48.1 FL (ref 35.9–50)
ERYTHROCYTE [DISTWIDTH] IN BLOOD BY AUTOMATED COUNT: 50.3 FL (ref 35.9–50)
GLOBULIN SER CALC-MCNC: 1.8 G/DL (ref 1.9–3.5)
GLUCOSE SERPL-MCNC: 86 MG/DL (ref 65–99)
HCT VFR BLD AUTO: 24.6 % (ref 37–47)
HCT VFR BLD AUTO: 26.9 % (ref 37–47)
HGB BLD-MCNC: 8.1 G/DL (ref 12–16)
HGB BLD-MCNC: 8.6 G/DL (ref 12–16)
IMM GRANULOCYTES # BLD AUTO: 0.02 K/UL (ref 0–0.11)
IMM GRANULOCYTES NFR BLD AUTO: 0.3 % (ref 0–0.9)
LYMPHOCYTES # BLD AUTO: 1.53 K/UL (ref 1–4.8)
LYMPHOCYTES NFR BLD: 21.1 % (ref 22–41)
MCH RBC QN AUTO: 33.3 PG (ref 27–33)
MCH RBC QN AUTO: 34 PG (ref 27–33)
MCHC RBC AUTO-ENTMCNC: 32 G/DL (ref 33.6–35)
MCHC RBC AUTO-ENTMCNC: 32.9 G/DL (ref 33.6–35)
MCV RBC AUTO: 103.4 FL (ref 81.4–97.8)
MCV RBC AUTO: 104.3 FL (ref 81.4–97.8)
MONOCYTES # BLD AUTO: 0.6 K/UL (ref 0–0.85)
MONOCYTES NFR BLD AUTO: 8.3 % (ref 0–13.4)
NEUTROPHILS # BLD AUTO: 5 K/UL (ref 2–7.15)
NEUTROPHILS NFR BLD: 69 % (ref 44–72)
NRBC # BLD AUTO: 0 K/UL
NRBC BLD-RTO: 0 /100 WBC
PLATELET # BLD AUTO: 209 K/UL (ref 164–446)
PLATELET # BLD AUTO: 212 K/UL (ref 164–446)
PMV BLD AUTO: 10.8 FL (ref 9–12.9)
PMV BLD AUTO: 11.4 FL (ref 9–12.9)
POTASSIUM SERPL-SCNC: 4.7 MMOL/L (ref 3.6–5.5)
PROT SERPL-MCNC: 4.6 G/DL (ref 6–8.2)
RBC # BLD AUTO: 2.38 M/UL (ref 4.2–5.4)
RBC # BLD AUTO: 2.58 M/UL (ref 4.2–5.4)
RH BLD: NORMAL
SODIUM SERPL-SCNC: 129 MMOL/L (ref 135–145)
WBC # BLD AUTO: 6 K/UL (ref 4.8–10.8)
WBC # BLD AUTO: 7.2 K/UL (ref 4.8–10.8)

## 2020-12-27 PROCEDURE — 36415 COLL VENOUS BLD VENIPUNCTURE: CPT

## 2020-12-27 PROCEDURE — A9270 NON-COVERED ITEM OR SERVICE: HCPCS | Performed by: PHYSICIAN ASSISTANT

## 2020-12-27 PROCEDURE — 700102 HCHG RX REV CODE 250 W/ 637 OVERRIDE(OP): Performed by: NURSE PRACTITIONER

## 2020-12-27 PROCEDURE — 700102 HCHG RX REV CODE 250 W/ 637 OVERRIDE(OP): Performed by: PHYSICIAN ASSISTANT

## 2020-12-27 PROCEDURE — 700111 HCHG RX REV CODE 636 W/ 250 OVERRIDE (IP): Performed by: NURSE PRACTITIONER

## 2020-12-27 PROCEDURE — A9270 NON-COVERED ITEM OR SERVICE: HCPCS | Performed by: INTERNAL MEDICINE

## 2020-12-27 PROCEDURE — A9270 NON-COVERED ITEM OR SERVICE: HCPCS | Performed by: NURSE PRACTITIONER

## 2020-12-27 PROCEDURE — 770006 HCHG ROOM/CARE - MED/SURG/GYN SEMI*

## 2020-12-27 PROCEDURE — 85027 COMPLETE CBC AUTOMATED: CPT

## 2020-12-27 PROCEDURE — 86850 RBC ANTIBODY SCREEN: CPT

## 2020-12-27 PROCEDURE — 700102 HCHG RX REV CODE 250 W/ 637 OVERRIDE(OP): Performed by: INTERNAL MEDICINE

## 2020-12-27 PROCEDURE — 99232 SBSQ HOSP IP/OBS MODERATE 35: CPT | Performed by: INTERNAL MEDICINE

## 2020-12-27 PROCEDURE — 86901 BLOOD TYPING SEROLOGIC RH(D): CPT

## 2020-12-27 PROCEDURE — 85025 COMPLETE CBC W/AUTO DIFF WBC: CPT

## 2020-12-27 PROCEDURE — 700101 HCHG RX REV CODE 250: Performed by: PHYSICIAN ASSISTANT

## 2020-12-27 PROCEDURE — 80053 COMPREHEN METABOLIC PANEL: CPT

## 2020-12-27 PROCEDURE — 86900 BLOOD TYPING SEROLOGIC ABO: CPT

## 2020-12-27 RX ORDER — HYDROCODONE BITARTRATE AND ACETAMINOPHEN 5; 325 MG/1; MG/1
1 TABLET ORAL EVERY 6 HOURS PRN
Status: DISCONTINUED | OUTPATIENT
Start: 2020-12-27 | End: 2020-12-28

## 2020-12-27 RX ORDER — HYDROMORPHONE HYDROCHLORIDE 1 MG/ML
0.5 INJECTION, SOLUTION INTRAMUSCULAR; INTRAVENOUS; SUBCUTANEOUS EVERY 4 HOURS PRN
Status: DISCONTINUED | OUTPATIENT
Start: 2020-12-27 | End: 2020-12-28

## 2020-12-27 RX ORDER — OXYCODONE HYDROCHLORIDE 5 MG/1
5 TABLET ORAL EVERY 6 HOURS PRN
Status: DISCONTINUED | OUTPATIENT
Start: 2020-12-27 | End: 2020-12-28

## 2020-12-27 RX ADMIN — OXYCODONE 5 MG: 5 TABLET ORAL at 13:32

## 2020-12-27 RX ADMIN — ALPRAZOLAM 0.5 MG: 0.5 TABLET ORAL at 23:22

## 2020-12-27 RX ADMIN — ACETAMINOPHEN 1000 MG: 500 TABLET ORAL at 23:18

## 2020-12-27 RX ADMIN — GABAPENTIN 300 MG: 300 CAPSULE ORAL at 12:38

## 2020-12-27 RX ADMIN — POTASSIUM CHLORIDE AND SODIUM CHLORIDE 1000 ML: 900; 150 INJECTION, SOLUTION INTRAVENOUS at 04:53

## 2020-12-27 RX ADMIN — GABAPENTIN 300 MG: 300 CAPSULE ORAL at 17:48

## 2020-12-27 RX ADMIN — FAMOTIDINE 20 MG: 10 INJECTION INTRAVENOUS at 04:53

## 2020-12-27 RX ADMIN — OXYCODONE 5 MG: 5 TABLET ORAL at 20:53

## 2020-12-27 RX ADMIN — ACETAMINOPHEN 1000 MG: 500 TABLET ORAL at 00:34

## 2020-12-27 RX ADMIN — ACETAMINOPHEN 1000 MG: 500 TABLET ORAL at 17:48

## 2020-12-27 RX ADMIN — FAMOTIDINE 20 MG: 10 INJECTION INTRAVENOUS at 17:48

## 2020-12-27 RX ADMIN — GABAPENTIN 300 MG: 300 CAPSULE ORAL at 04:53

## 2020-12-27 RX ADMIN — ALPRAZOLAM 0.5 MG: 0.5 TABLET ORAL at 00:34

## 2020-12-27 RX ADMIN — ACETAMINOPHEN 1000 MG: 500 TABLET ORAL at 04:53

## 2020-12-27 RX ADMIN — ACETAMINOPHEN 1000 MG: 500 TABLET ORAL at 12:38

## 2020-12-27 ASSESSMENT — ENCOUNTER SYMPTOMS
FEVER: 0
DIARRHEA: 0
DIZZINESS: 1
COUGH: 0
ABDOMINAL PAIN: 1
HEARTBURN: 0
PALPITATIONS: 0
CHILLS: 0
NAUSEA: 0
SHORTNESS OF BREATH: 0
VOMITING: 0

## 2020-12-27 ASSESSMENT — PAIN DESCRIPTION - PAIN TYPE
TYPE: ACUTE PAIN
TYPE: CHRONIC PAIN
TYPE: ACUTE PAIN

## 2020-12-27 NOTE — PROGRESS NOTES
"Received report from AM RN; assumed care. A&O 3-4, mild forgetfulness noted to time. VSS. Saturations vacillates with pain control/medications. 1L NC placed with humidification d/t reported congestion. Patient denied SOB, numbness, tingling, vomiting. Mild nausea reported at beginning of shift. Medicated for nausea/anxiety. Patient tolerating PCA MS; noted sedative effects after pressing button. High pain reports given, nonverbal indicators normally calm, unlabored breathing. At beginning of shift, not as easy to arouse. Patient stated that she finally \"slept\". Abdomen round, mildly distended, tender. + void. - flatus. Hypoactive BS x 4 noted. Patient tolerating diet. Patient reminded utilizing bariatric rules again for pills, carbonation, straws; verbalized understanding. Patient up SBA with steady gait; requested call light usage d/t multiple plugs/lines; demonstrated understanding. POC discussed. Questions answered. Bed locked/lowest position. Call light/personal belongings within reach. All needs met throughout shift.    "

## 2020-12-27 NOTE — PROGRESS NOTES
Bedside report received.  Assessment complete.  A&O x 4. Patient calls appropriately.  Patient with standby assist.    Patient has 8/10 pain.  PCA pump D/C'd, oral pain medication started.  Patient reports her pain is palliated with oral pain medications.  Denies N&V. Tolerating Bariatric Clear diet. Patient can be administered whole meds, D/C'd crushed order.  Surgical hernia closure x2, small bowel resection, closure of fistula.  Patient has Prevena and abdominal binder in place.  Patient voids without issue and passing gas without issue.  Patient denies SOB.  SCD's in place.  Review plan with of care with patient. Call light and personal belongings with in reach. Hourly rounding in place. All needs met at this time.

## 2020-12-27 NOTE — PROGRESS NOTES
Surgical Progress Note    Author: Bebeto Lopez P.A.-C. Date & Time created: 2020   9:43 AM     Interval Events:  POD#3 1.  Exploratory laparotomy.  2.  Adhesiolysis.  3.  Small bowel resection.  4.  A new enteroenterostomy creation x2.  5.  Closure with repair of incisional hernia with use of prior mesh.     Patient is tired today. Pain and nausea mostly controlled. Per nursing staff she is requesting ativan and percocets for pain. NGT discontinued and tolerating bariatric CLD with minimal nausea no vomiting. Prevena over midline incision to low wall suction with 175cc of sanguinous output.  Admits to typical incisional abdominal pain, controlled with medication. Pt is not ambulating very much but occasionally to the bathroom and voiding. Rudd dced. VSS. Labs reviewed.     Review of Systems   Constitutional: Positive for malaise/fatigue. Negative for chills and fever.   Respiratory: Negative for cough and shortness of breath.    Cardiovascular: Negative for chest pain and palpitations.   Gastrointestinal: Positive for abdominal pain. Negative for diarrhea, heartburn, nausea and vomiting.   Genitourinary: Negative for dysuria.   Neurological: Positive for dizziness.     Hemodynamics:  Temp (24hrs), Av.8 °C (98.2 °F), Min:36.3 °C (97.4 °F), Max:37.2 °C (99 °F)  Temperature: 36.9 °C (98.4 °F)  Pulse  Av  Min: 65  Max: 104   Blood Pressure: 121/77     Respiratory:    Respiration: 18, Pulse Oximetry: 99 %        RUL Breath Sounds: Clear, RML Breath Sounds: Clear, RLL Breath Sounds: Diminished, MARGARET Breath Sounds: Clear, LLL Breath Sounds: Diminished  Neuro:  GCS       Fluids:    Intake/Output Summary (Last 24 hours) at 2020 0943  Last data filed at 2020 0500  Gross per 24 hour   Intake 1205.83 ml   Output 25 ml   Net 1180.83 ml        Current Diet Order   Procedures   • Diet Order Diet: Clear Liquid; Miscellaneous modifications: (optional): Bariatric     Physical Exam  Constitutional:        General: She is not in acute distress.  HENT:      Head: Normocephalic and atraumatic.      Nose:      Comments: NG tube DC'd     Mouth/Throat:      Mouth: Mucous membranes are moist.   Eyes:      Extraocular Movements: Extraocular movements intact.      Conjunctiva/sclera: Conjunctivae normal.   Neck:      Musculoskeletal: Normal range of motion and neck supple.   Cardiovascular:      Rate and Rhythm: Normal rate and regular rhythm.   Pulmonary:      Effort: Pulmonary effort is normal.   Abdominal:      General: There is no distension.      Palpations: There is no mass.      Tenderness: There is abdominal tenderness. There is no guarding or rebound.      Comments: prevena in place to low wall suction 175cc sanguinous output over 24hrs   Skin:     General: Skin is warm and dry.      Coloration: Skin is pale.   Neurological:      Mental Status: She is alert and oriented to person, place, and time.   Psychiatric:         Mood and Affect: Mood normal.         Behavior: Behavior normal.         Judgment: Judgment normal.       Labs:  Recent Results (from the past 24 hour(s))   Comp Metabolic Panel (CMP)    Collection Time: 12/27/20  4:04 AM   Result Value Ref Range    Sodium 129 (L) 135 - 145 mmol/L    Potassium 4.7 3.6 - 5.5 mmol/L    Chloride 100 96 - 112 mmol/L    Co2 24 20 - 33 mmol/L    Anion Gap 5.0 (L) 7.0 - 16.0    Glucose 86 65 - 99 mg/dL    Bun 4 (L) 8 - 22 mg/dL    Creatinine 0.32 (L) 0.50 - 1.40 mg/dL    Calcium 7.0 (L) 8.5 - 10.5 mg/dL    AST(SGOT) 22 12 - 45 U/L    ALT(SGPT) 19 2 - 50 U/L    Alkaline Phosphatase 38 30 - 99 U/L    Total Bilirubin 0.5 0.1 - 1.5 mg/dL    Albumin 2.8 (L) 3.2 - 4.9 g/dL    Total Protein 4.6 (L) 6.0 - 8.2 g/dL    Globulin 1.8 (L) 1.9 - 3.5 g/dL    A-G Ratio 1.6 g/dL   CBC WITH DIFFERENTIAL    Collection Time: 12/27/20  4:04 AM   Result Value Ref Range    WBC 7.2 4.8 - 10.8 K/uL    RBC 2.58 (L) 4.20 - 5.40 M/uL    Hemoglobin 8.6 (L) 12.0 - 16.0 g/dL    Hematocrit 26.9 (L) 37.0 -  47.0 %    .3 (H) 81.4 - 97.8 fL    MCH 33.3 (H) 27.0 - 33.0 pg    MCHC 32.0 (L) 33.6 - 35.0 g/dL    RDW 50.3 (H) 35.9 - 50.0 fL    Platelet Count 209 164 - 446 K/uL    MPV 11.4 9.0 - 12.9 fL    Neutrophils-Polys 69.00 44.00 - 72.00 %    Lymphocytes 21.10 (L) 22.00 - 41.00 %    Monocytes 8.30 0.00 - 13.40 %    Eosinophils 1.00 0.00 - 6.90 %    Basophils 0.30 0.00 - 1.80 %    Immature Granulocytes 0.30 0.00 - 0.90 %    Nucleated RBC 0.00 /100 WBC    Neutrophils (Absolute) 5.00 2.00 - 7.15 K/uL    Lymphs (Absolute) 1.53 1.00 - 4.80 K/uL    Monos (Absolute) 0.60 0.00 - 0.85 K/uL    Eos (Absolute) 0.07 0.00 - 0.51 K/uL    Baso (Absolute) 0.02 0.00 - 0.12 K/uL    Immature Granulocytes (abs) 0.02 0.00 - 0.11 K/uL    NRBC (Absolute) 0.00 K/uL   ESTIMATED GFR    Collection Time: 12/27/20  4:04 AM   Result Value Ref Range    GFR If African American >60 >60 mL/min/1.73 m 2    GFR If Non African American >60 >60 mL/min/1.73 m 2     Medical Decision Making, by Problem:  Active Hospital Problems    Diagnosis   • COPD (chronic obstructive pulmonary disease) (HCC) [J44.9]   • Gastric fistula [K31.6]     Plan:    Pt is alert and oriented, NAD. Breathing unlabored. Incisions ok. VSS. Labs reviewed.  Hgh down 8.6 from 10.4, Sodium 129 down from 131 with NS. Will hold Lovenox due to what appears to be acute blood loss anemia.Will DC fluids as patient is tolerating PO CLD well and likely some component of dilution. Type and Screen and transfuse 2 Units of RBC if <8.0. Will order CBC for 4pm today.   Pain appears to be mostly controlled. DC PCA and begin PO norco 5mg Q6H as tolerated.  Encouraged ambulation and incentive spirometry. Wean O2.  Switch wound vac back to prevena and monitor output. NGT discontinued tolerating CLD. Tobin Walters.   Pt also seen and examined by Dr. Benavides.  We appreciate Hospital Medic[facility] help on this particular case.   Will stay for today.     Quality Measures:  Quality-Core Measures    Discussed patient  condition with Patient and Dr. Beanvides and Dr. Flores

## 2020-12-27 NOTE — CARE PLAN
Problem: Safety  Goal: Will remain free from injury  Outcome: PROGRESSING AS EXPECTED  Low fall risk. Education provided regarding fall risk and calling for assistance d/t numerous lines. Verbalized/demonstrated understanding.     Problem: Venous Thromboembolism (VTW)/Deep Vein Thrombosis (DVT) Prevention:  Goal: Patient will participate in Venous Thrombosis (VTE)/Deep Vein Thrombosis (DVT)Prevention Measures  Outcome: PROGRESSING AS EXPECTED  Encouraged SCDs/refused. Patient up ambulating to/from toilet.     Problem: Bowel/Gastric:  Goal: Normal bowel function is maintained or improved  Outcome: PROGRESSING SLOWER THAN EXPECTED  Hypoactive BS x 4 noted. Patient denied flatus. Rounded abdomen. Prevena in place. Encouraged hydration/ice chips/ambulation.     Problem: Knowledge Deficit  Goal: Knowledge of disease process/condition, treatment plan, diagnostic tests, and medications will improve  Outcome: PROGRESSING AS EXPECTED  Discussed POC. Verbalized understanding.        Problem: Pain Management  Goal: Pain level will decrease to patient's comfort goal  Outcome: PROGRESSING AS EXPECTED  Patient stated pain reduced from a 12/10 to 8-9/10. Patient stated PCA effective.

## 2020-12-27 NOTE — PROGRESS NOTES
Progress Note    Author:  Colleen Flores MD    Date & Time:   12/27/2020   12:23 PM          Patient ID:             Name:             Lachelle Hilton   YOB: 1960  Age:                 60 y.o.  female   MRN:               [id number]    ________________________________________________________________________  [facility] Acute [facility] Surgery Service note               Exam:       Vitals:    12/27/20 1100   BP:    Pulse: 100   Resp: 18   Temp:    SpO2: 99%     SpO2  Min: 91 %  Max: 100 %  O2 (LPM)  Min: 0  Max: 6  Temp  Min: 36.2 °C (97.1 °F)  Max: 37.2 °C (99 °F)    Intake/Output Summary (Last 24 hours) at 12/27/2020 1223  Last data filed at 12/27/2020 0500  Gross per 24 hour   Intake 585 ml   Output 0 ml   Net 585 ml     Output by Drain (mL) 12/25/20 0700 - 12/25/20 1859 12/25/20 1900 - 12/26/20 0659 12/26/20 0700 - 12/26/20 1859 12/26/20 1900 - 12/27/20 0659 12/27/20 0700 - 12/27/20 1223   Patient has no LDAs of requested type attached.       DIET ORDERS (From admission to next 24h)     Start     Ordered    12/25/20 0005  Diet Order Diet: Clear Liquid; Miscellaneous modifications: (optional): Bariatric  ALL MEALS     Question Answer Comment   Diet: Clear Liquid    Miscellaneous modifications: (optional) Bariatric        12/24/20 1612                        Physical Exam  Nursing note reviewed.   Constitutional:       Appearance: NAD  Currently alert, oriented x 4.    Cardiovascular:      Rate and Rhythm: Normal rate and regular rhythm. Extremities warm, well perfused no edema.   Pulmonary:      Effort: Pulmonary effort is normal.      Breath sounds: No wheezes or stridor  Abdominal:      General: Abdomen is soft,  mildly distended, Tenderness mostly midline. No guarding  Sanguinous pooling around prevena sponge        Recent Labs     12/25/20  0410 12/26/20  0430 12/27/20  0404   SODIUM 129* 131* 129*   POTASSIUM 4.9 5.0 4.7   CHLORIDE 95* 101 100   CO2 26 25 24   BUN 7* 5* 4*   CREATININE 0.43*  0.39* 0.32*   CALCIUM 7.3* 6.9* 7.0*       Recent Labs     12/25/20  0410 12/26/20  0430 12/27/20  0404   ALTSGPT 26 17 19   ASTSGOT 28 26 22   ALKPHOSPHAT 34 35 38   TBILIRUBIN 0.7 0.5 0.5   GLUCOSE 144* 95 86       Recent Labs     12/25/20  0410 12/26/20  0430 12/27/20  0404   RBC 3.61* 3.08* 2.58*   HEMOGLOBIN 12.2 10.4* 8.6*   HEMATOCRIT 35.7* 32.0* 26.9*   PLATELETCT 244 219 209       Recent Labs     12/25/20  0410 12/26/20  0430 12/27/20  0404   WBC 7.1 6.2 7.2   NEUTSPOLYS  --   --  69.00   LYMPHOCYTES  --   --  21.10*   MONOCYTES  --   --  8.30   EOSINOPHILS  --   --  1.00   BASOPHILS  --   --  0.30   ASTSGOT 28 26 22   ALTSGPT 26 17 19   ALKPHOSPHAT 34 35 38   TBILIRUBIN 0.7 0.5 0.5         ________________________________________________________________________      Patient Active Problem List   Diagnosis   • Anxiousness   • Vitamin B 12 deficiency   • Lumbar spine pain   • Reactive airway disease without complication   • Coccygeal pain, chronic   • Environmental and seasonal allergies   • Bariatric surgery status   • Osteoporosis without current pathological fracture   • Vitamin D deficiency disease   • High serum parathyroid hormone (PTH)   • Menopause   • COPD (chronic obstructive pulmonary disease) (HCC)   • Gastric fistula     Assessment/Plan:    Agree with assessment and plan by HELENA Lopez   Patient is more alert after holding PCA.  H/h slowly trending down. Vitals stable.  -Prevena dressing removed to evaluate for bleeding. There was slow non-pulsatile bleeding from mid aspect of incision w/ pooling under the sponge dressing.  -Lovenox being held  -Pressure dressing with abdominal binder applied. Nurse notified to monitor dressing and call if it becomes saturated.

## 2020-12-27 NOTE — PROGRESS NOTES
[facility] Medicine Daily Progress Note    Date of Service  12/27/2020    Chief Complaint/Reason for consult:  60 y.o. female transferred from [facility] on 12/24/2020 for gastric fistula repair.  She has a history of bariatric surgery, COPD, anxiety, and chronic pain.  Medical service were consulted for COPD management     Hospital Course  Patient underwent exploratory laparotomy, adhesion lysis, small bowel resection, with a new enteroenterostomy creation x 2 on 12/24/2020.      Interval Problem Update  Currently saturating 90-92% on room air nasal cannula.  NG tube was removed on 12/25/2020. Tolerating clear liquid diet. Hgb is 8.6.      Consultants/Specialty  Medical services on consult     Code Status  Full Code     Disposition  Home when stable     Review of Systems  Review of Systems   Constitutional: Negative.    HENT: Negative.    Eyes: Negative.    Respiratory: Negative.  Negative for shortness of breath and wheezing.    Cardiovascular: Negative.    Gastrointestinal: Negative for nausea.        Surgical pain is stable   Genitourinary: Negative.    Musculoskeletal: Negative.    Skin: Negative.    Neurological: Negative.  Negative for weakness.   Endo/Heme/Allergies: Negative.    Psychiatric/Behavioral: Negative.         Physical Exam  Temp:  [36.3 °C (97.4 °F)-37.2 °C (99 °F)] 36.9 °C (98.4 °F)  Pulse:  [] 100  Resp:  [14-20] 18  BP: ()/(59-77) 121/77  SpO2:  [91 %-100 %] 99 %    Physical Exam  Constitutional:       General: She is not in acute distress.  HENT:      Head: Normocephalic.      Nose:      Mouth: Mucous membranes are moist.   Eyes:      Extraocular Movements: Extraocular movements intact.      Pupils: Pupils are equal, round, and reactive to light.   Neck:      Musculoskeletal: Normal range of motion.   Cardiovascular:      Rate and Rhythm: Normal rate.   Pulmonary:      Effort: Pulmonary effort is normal.      Breath sounds: Normal breath sounds. No wheezing.   Abdominal:       Comments: Abdominal incision is intact with wound vac   Musculoskeletal:      Right lower leg: No edema.      Left lower leg: No edema.   Skin:     General: Skin Pale     Comments: Abdominal incision with wound VAC   Neurological:      Mental Status: She is alert.   Psychiatric:         Mood and Affect: Mood normal.     Fluids    Intake/Output Summary (Last 24 hours) at 12/27/2020 1136  Last data filed at 12/27/2020 0500  Gross per 24 hour   Intake 1085.83 ml   Output 0 ml   Net 1085.83 ml       Laboratory  Recent Labs     12/25/20  0410 12/26/20  0430 12/27/20  0404   WBC 7.1 6.2 7.2   RBC 3.61* 3.08* 2.58*   HEMOGLOBIN 12.2 10.4* 8.6*   HEMATOCRIT 35.7* 32.0* 26.9*   MCV 98.9* 103.9* 104.3*   MCH 33.8* 33.8* 33.3*   MCHC 34.2 32.5* 32.0*   RDW 44.1 50.6* 50.3*   PLATELETCT 244 219 209   MPV 10.7 10.8 11.4     Recent Labs     12/25/20 0410 12/26/20  0430 12/27/20  0404   SODIUM 129* 131* 129*   POTASSIUM 4.9 5.0 4.7   CHLORIDE 95* 101 100   CO2 26 25 24   GLUCOSE 144* 95 86   BUN 7* 5* 4*   CREATININE 0.43* 0.39* 0.32*   CALCIUM 7.3* 6.9* 7.0*                   Imaging  No orders to display        Assessment/Plan  Gastric fistula  Assessment & Plan  POD #3 exploratory laparotomy, with adhesion lysis, small bowel resection, new enteroenterostomy creation x 2, and closure with repair of incisional hernia with use of prior mesh. Patient is being managed by General Surgery (primary service).  NG tube was removed 12/25/2020. On clear liquid diet.  Midline incision has wound VAC. Hgb is 8.6 today, monitor closely. Continue management per general surgery    COPD (chronic obstructive pulmonary disease) (HCC)  Assessment & Plan  Stable.  Currently saturating 90-92% on room air. Denies SOB. Continue supplemental oxygen to keep saturation above 92%. Continue as needed albuterol.       VTE prophylaxis: Lovenox

## 2020-12-28 PROBLEM — Z98.890 S/P EXPLORATORY LAPAROTOMY: Status: ACTIVE | Noted: 2020-12-28

## 2020-12-28 PROBLEM — K31.6 GASTRIC FISTULA: Status: RESOLVED | Noted: 2020-12-25 | Resolved: 2020-12-28

## 2020-12-28 LAB
ALBUMIN SERPL BCP-MCNC: 3.2 G/DL (ref 3.2–4.9)
ALBUMIN/GLOB SERPL: 1.4 G/DL
ALP SERPL-CCNC: 52 U/L (ref 30–99)
ALT SERPL-CCNC: 21 U/L (ref 2–50)
ANION GAP SERPL CALC-SCNC: 5 MMOL/L (ref 7–16)
AST SERPL-CCNC: 30 U/L (ref 12–45)
BASOPHILS # BLD AUTO: 0.4 % (ref 0–1.8)
BASOPHILS # BLD: 0.03 K/UL (ref 0–0.12)
BILIRUB SERPL-MCNC: 0.8 MG/DL (ref 0.1–1.5)
BUN SERPL-MCNC: 4 MG/DL (ref 8–22)
CALCIUM SERPL-MCNC: 8.3 MG/DL (ref 8.5–10.5)
CHLORIDE SERPL-SCNC: 96 MMOL/L (ref 96–112)
CO2 SERPL-SCNC: 27 MMOL/L (ref 20–33)
CREAT SERPL-MCNC: 0.36 MG/DL (ref 0.5–1.4)
EOSINOPHIL # BLD AUTO: 0.1 K/UL (ref 0–0.51)
EOSINOPHIL NFR BLD: 1.5 % (ref 0–6.9)
ERYTHROCYTE [DISTWIDTH] IN BLOOD BY AUTOMATED COUNT: 49.1 FL (ref 35.9–50)
GLOBULIN SER CALC-MCNC: 2.3 G/DL (ref 1.9–3.5)
GLUCOSE SERPL-MCNC: 128 MG/DL (ref 65–99)
HCT VFR BLD AUTO: 29.5 % (ref 37–47)
HGB BLD-MCNC: 9.6 G/DL (ref 12–16)
IMM GRANULOCYTES # BLD AUTO: 0.02 K/UL (ref 0–0.11)
IMM GRANULOCYTES NFR BLD AUTO: 0.3 % (ref 0–0.9)
LYMPHOCYTES # BLD AUTO: 1.12 K/UL (ref 1–4.8)
LYMPHOCYTES NFR BLD: 16.8 % (ref 22–41)
MCH RBC QN AUTO: 33.9 PG (ref 27–33)
MCHC RBC AUTO-ENTMCNC: 32.5 G/DL (ref 33.6–35)
MCV RBC AUTO: 104.2 FL (ref 81.4–97.8)
MONOCYTES # BLD AUTO: 0.54 K/UL (ref 0–0.85)
MONOCYTES NFR BLD AUTO: 8.1 % (ref 0–13.4)
NEUTROPHILS # BLD AUTO: 4.87 K/UL (ref 2–7.15)
NEUTROPHILS NFR BLD: 72.9 % (ref 44–72)
NRBC # BLD AUTO: 0 K/UL
NRBC BLD-RTO: 0 /100 WBC
PLATELET # BLD AUTO: 273 K/UL (ref 164–446)
PMV BLD AUTO: 11 FL (ref 9–12.9)
POTASSIUM SERPL-SCNC: 3.7 MMOL/L (ref 3.6–5.5)
PROT SERPL-MCNC: 5.5 G/DL (ref 6–8.2)
RBC # BLD AUTO: 2.83 M/UL (ref 4.2–5.4)
SODIUM SERPL-SCNC: 128 MMOL/L (ref 135–145)
WBC # BLD AUTO: 6.7 K/UL (ref 4.8–10.8)

## 2020-12-28 PROCEDURE — 85025 COMPLETE CBC W/AUTO DIFF WBC: CPT

## 2020-12-28 PROCEDURE — 700102 HCHG RX REV CODE 250 W/ 637 OVERRIDE(OP): Performed by: SURGERY

## 2020-12-28 PROCEDURE — 770006 HCHG ROOM/CARE - MED/SURG/GYN SEMI*

## 2020-12-28 PROCEDURE — 700111 HCHG RX REV CODE 636 W/ 250 OVERRIDE (IP): Performed by: NURSE PRACTITIONER

## 2020-12-28 PROCEDURE — 36415 COLL VENOUS BLD VENIPUNCTURE: CPT

## 2020-12-28 PROCEDURE — A9270 NON-COVERED ITEM OR SERVICE: HCPCS | Performed by: NURSE PRACTITIONER

## 2020-12-28 PROCEDURE — A9270 NON-COVERED ITEM OR SERVICE: HCPCS | Performed by: PHYSICIAN ASSISTANT

## 2020-12-28 PROCEDURE — 700102 HCHG RX REV CODE 250 W/ 637 OVERRIDE(OP): Performed by: COLON & RECTAL SURGERY

## 2020-12-28 PROCEDURE — A9270 NON-COVERED ITEM OR SERVICE: HCPCS | Performed by: SURGERY

## 2020-12-28 PROCEDURE — A9270 NON-COVERED ITEM OR SERVICE: HCPCS | Performed by: COLON & RECTAL SURGERY

## 2020-12-28 PROCEDURE — A9270 NON-COVERED ITEM OR SERVICE: HCPCS | Performed by: INTERNAL MEDICINE

## 2020-12-28 PROCEDURE — 700102 HCHG RX REV CODE 250 W/ 637 OVERRIDE(OP): Performed by: NURSE PRACTITIONER

## 2020-12-28 PROCEDURE — 80053 COMPREHEN METABOLIC PANEL: CPT

## 2020-12-28 PROCEDURE — 700102 HCHG RX REV CODE 250 W/ 637 OVERRIDE(OP): Performed by: INTERNAL MEDICINE

## 2020-12-28 PROCEDURE — 700102 HCHG RX REV CODE 250 W/ 637 OVERRIDE(OP): Performed by: PHYSICIAN ASSISTANT

## 2020-12-28 PROCEDURE — 99232 SBSQ HOSP IP/OBS MODERATE 35: CPT | Performed by: INTERNAL MEDICINE

## 2020-12-28 RX ORDER — FAMOTIDINE 20 MG/1
20 TABLET, FILM COATED ORAL 2 TIMES DAILY
Status: DISCONTINUED | OUTPATIENT
Start: 2020-12-28 | End: 2020-12-29 | Stop reason: HOSPADM

## 2020-12-28 RX ORDER — OXYCODONE HYDROCHLORIDE 5 MG/1
5 TABLET ORAL EVERY 4 HOURS PRN
Status: DISCONTINUED | OUTPATIENT
Start: 2020-12-28 | End: 2020-12-29 | Stop reason: HOSPADM

## 2020-12-28 RX ADMIN — ACETAMINOPHEN 1000 MG: 500 TABLET ORAL at 11:52

## 2020-12-28 RX ADMIN — FAMOTIDINE 20 MG: 20 TABLET ORAL at 18:43

## 2020-12-28 RX ADMIN — SIMETHICONE 80 MG: 80 TABLET, CHEWABLE ORAL at 22:42

## 2020-12-28 RX ADMIN — FAMOTIDINE 20 MG: 10 INJECTION INTRAVENOUS at 05:34

## 2020-12-28 RX ADMIN — ALPRAZOLAM 0.5 MG: 0.5 TABLET ORAL at 19:30

## 2020-12-28 RX ADMIN — ACETAMINOPHEN 1000 MG: 500 TABLET ORAL at 05:34

## 2020-12-28 RX ADMIN — ONDANSETRON 4 MG: 2 INJECTION INTRAMUSCULAR; INTRAVENOUS at 18:47

## 2020-12-28 RX ADMIN — OXYCODONE 5 MG: 5 TABLET ORAL at 09:00

## 2020-12-28 RX ADMIN — OXYCODONE 5 MG: 5 TABLET ORAL at 22:42

## 2020-12-28 RX ADMIN — OXYCODONE 5 MG: 5 TABLET ORAL at 17:41

## 2020-12-28 RX ADMIN — OXYCODONE 5 MG: 5 TABLET ORAL at 09:07

## 2020-12-28 RX ADMIN — MAGNESIUM HYDROXIDE 30 ML: 400 SUSPENSION ORAL at 13:36

## 2020-12-28 RX ADMIN — OXYCODONE 5 MG: 5 TABLET ORAL at 13:36

## 2020-12-28 RX ADMIN — ACETAMINOPHEN 1000 MG: 500 TABLET ORAL at 17:41

## 2020-12-28 RX ADMIN — OXYCODONE 5 MG: 5 TABLET ORAL at 03:07

## 2020-12-28 RX ADMIN — GABAPENTIN 300 MG: 300 CAPSULE ORAL at 05:34

## 2020-12-28 ASSESSMENT — PAIN DESCRIPTION - PAIN TYPE
TYPE: SURGICAL PAIN
TYPE: SURGICAL PAIN
TYPE: CHRONIC PAIN;SURGICAL PAIN
TYPE: SURGICAL PAIN
TYPE: SURGICAL PAIN
TYPE: CHRONIC PAIN
TYPE: SURGICAL PAIN

## 2020-12-28 ASSESSMENT — ENCOUNTER SYMPTOMS
NAUSEA: 0
HEARTBURN: 0
DIARRHEA: 0
SHORTNESS OF BREATH: 0
ABDOMINAL PAIN: 1
PALPITATIONS: 0
VOMITING: 0
CHILLS: 0
COUGH: 0
FEVER: 0
DIZZINESS: 1

## 2020-12-28 NOTE — PROGRESS NOTES
Surgical Progress Note    Author: Danielle Espinosa P.A.-C. Date & Time created: 2020   8:20 AM     Interval Events:  POD#4 Exploratory laparotomy, adhesiolysis, small bowel resection, new enteroenterostomy creation x2, closure with repair of incisional hernia with use of prior mesh.  She is feeling better this morning. She has been walking around the room, but not feeling comfortable walking the mcnair due to dizziness. Hb yesterday afternoon slightly lower at 8.1 from morning labs. Labs pending this morning. She is tolerating clears. Feeling hungry. +flatus. Small bleed along incision controlled with pressure dressing and abdominal binder after removal of Prevena. Pain better controlled with Oxycodone. Pt takes Percocet 10mg chronically.     Review of Systems   Constitutional: Negative for chills and fever.   Respiratory: Negative for cough and shortness of breath.    Cardiovascular: Negative for chest pain and palpitations.   Gastrointestinal: Positive for abdominal pain (incisional). Negative for diarrhea, heartburn, nausea and vomiting.   Genitourinary: Negative for dysuria.   Neurological: Positive for dizziness.     Hemodynamics:  Temp (24hrs), Av.1 °C (98.8 °F), Min:36.5 °C (97.7 °F), Max:37.7 °C (99.8 °F)  Temperature: 37.2 °C (98.9 °F)  Pulse  Av.9  Min: 65  Max: 104   Blood Pressure: 113/72     Respiratory:    Respiration: 18, Pulse Oximetry: 99 %        RUL Breath Sounds: Clear, RML Breath Sounds: Diminished, RLL Breath Sounds: Diminished, MARGARET Breath Sounds: Clear, LLL Breath Sounds: Diminished  Neuro:  GCS       Fluids:    Intake/Output Summary (Last 24 hours) at 2020 0820  Last data filed at 2020  Gross per 24 hour   Intake 240 ml   Output --   Net 240 ml        Current Diet Order   Procedures   • Diet Order Diet: Full Liquid (no carbonation); Tray Modifications (optional): No Straws     Physical Exam  Constitutional:       Appearance: She is obese.   HENT:      Head:  Normocephalic and atraumatic.      Mouth/Throat:      Pharynx: Oropharynx is clear.   Eyes:      Conjunctiva/sclera: Conjunctivae normal.   Neck:      Musculoskeletal: Normal range of motion.   Cardiovascular:      Rate and Rhythm: Normal rate and regular rhythm.   Pulmonary:      Effort: Pulmonary effort is normal.   Abdominal:      General: There is distension (mild).      Palpations: Abdomen is soft. There is no mass.      Tenderness: There is abdominal tenderness (incisional). There is no guarding or rebound.   Musculoskeletal: Normal range of motion.   Skin:     General: Skin is warm and dry.      Findings: No erythema or rash.      Comments: Midline incision with staples, pressure dressing controlling bleeding. Abdominal binder.   Neurological:      Mental Status: She is alert and oriented to person, place, and time.   Psychiatric:         Mood and Affect: Mood normal.       Labs:  Recent Results (from the past 24 hour(s))   CBC WITHOUT DIFFERENTIAL    Collection Time: 12/27/20  4:15 PM   Result Value Ref Range    WBC 6.0 4.8 - 10.8 K/uL    RBC 2.38 (L) 4.20 - 5.40 M/uL    Hemoglobin 8.1 (L) 12.0 - 16.0 g/dL    Hematocrit 24.6 (L) 37.0 - 47.0 %    .4 (H) 81.4 - 97.8 fL    MCH 34.0 (H) 27.0 - 33.0 pg    MCHC 32.9 (L) 33.6 - 35.0 g/dL    RDW 48.1 35.9 - 50.0 fL    Platelet Count 212 164 - 446 K/uL    MPV 10.8 9.0 - 12.9 fL     Medical Decision Making, by Problem:  Active Hospital Problems    Diagnosis   • COPD (chronic obstructive pulmonary disease) (HCC) [J44.9]   • Gastric fistula [K31.6]     Plan:  Pt alert and oriented, no acute distress. VS stable, Labs pending. Slight drop in Hb yesterday afternoon from morning labs. Await today's AM labs. Transfuse if Hb<8 with dizziness.  Encouraged to walk the mcnair with nursing staff. Advance to full liquid diet. Small bleed along midline incision controlled with pressure dressing and abdominal binder. Discussed discharge home later this afternoon if Hb, labs,  and VS stable and patient tolerates fulls. Continue to hold lovenox. She may need to stay another night due to abnormal labs or VS, hypoxia, pain control.  Discussed with patient to continue taking Percocet at home and follow up in our office in 1 week if able to discharge home this afternoon. Discussed with Dr. Benavides and [facility] covering for Dr. Benavides.     Quality Measures:  Quality-Core Measures   Reviewed items::  Labs reviewed  Rudd catheter::  No Rudd  DVT prophylaxis pharmacological::  Contraindicated - High bleeding risk      Discussed patient condition with RN, Patient and Dr. Benavides

## 2020-12-28 NOTE — PROGRESS NOTES
Received report from AM RN; assumed care. A&O x 4. VSS. 99% on 1L NC while sleeping. Patient reported mild SOB. Denied nausea, vomiting, numbness, tingling. Medicated for pain; see MAR. Incisional pain biggest issue at present time. Abdomen round, tender. MLI with penrose drain at top of incision with pressure dressing/abd pad atop; no drainage noted. Abdominal binder covering. CDI dressing. Hypoactive BS x 4 at beginning of shift. Normoactive BS x 4 mid-shift. Hunger pangs at end of shift. Patient up self, steady gait noted. Voiding adequately. Patient tolerating diet, asking to be advanced. Patient stated oxycodone provided better pain control. Educated upon importance of hydration/ambulation. Verbalized/demonstrated understanding. Patient anxious to get home to [facility]. POC discussed. Questions answered. Bed locked/lowest position. Call light/personal belongings within reach. All needs met/patient ambulating in room at present time.

## 2020-12-28 NOTE — PROGRESS NOTES
Hospital Medicine Daily Progress Note    Date of Service  12/28/2020    Chief Complaint/Reason for consult:  60 y.o. female transferred from [facility] on 12/24/2020 for gastric fistula repair.  She has a history of bariatric surgery, COPD, anxiety, and chronic pain.  Medical service were consulted for COPD management     Hospital Course  Patient underwent exploratory laparotomy, adhesion lysis, small bowel resection, with a new enteroenterostomy creation x 2 on 12/24/2020.      Interval Problem Update  POD # 4. Currently saturating 90-92% on room air nasal cannula.  NG tube was removed on 12/25/2020. Tolerating clear liquid diet. Hgb is 8.6.      Consultants/Specialty  Medical services on consult for COPD managment     Code Status  Full Code     Disposition  Home when stable     Review of Systems  Review of Systems   Constitutional: Negative.    HENT: Negative.    Eyes: Negative.    Respiratory: Negative.  Negative for shortness of breath and wheezing.    Cardiovascular: Negative.    Gastrointestinal: Negative for nausea.        Surgical pain is stable   Genitourinary: Negative.    Musculoskeletal: Negative.    Skin: Negative.    Neurological: Negative.  Negative for weakness.   Endo/Heme/Allergies: Negative.    Psychiatric/Behavioral: Negative.         Physical Exam  Temp:  [36.5 °C (97.7 °F)-37.6 °C (99.6 °F)] 37.2 °C (98.9 °F)  Pulse:  [84-97] 89  Resp:  [16-18] 18  BP: (111-144)/(66-84) 113/72  SpO2:  [90 %-100 %] 99 %    Physical Exam  Constitutional:       General: She is not in acute distress.  HENT:      Head: Normocephalic.      Nose:      Mouth: Mucous membranes are moist.   Eyes:      Extraocular Movements: Extraocular movements intact.      Pupils: Pupils are equal, round, and reactive to light.   Neck:      Musculoskeletal: Normal range of motion.   Cardiovascular:      Rate and Rhythm: Normal rate.   Pulmonary:      Effort: Pulmonary effort is normal.      Breath sounds: Normal breath sounds.  No wheezing.   Abdominal:      Comments: +BS, soft, ND  Musculoskeletal:      Right lower leg: No edema.      Left lower leg: No edema.   Skin:     General: Skin Pale     Comments: Abdominal incision intact  Neurological:      Mental Status: She is alert.   Psychiatric:         Mood and Affect: Mood normal.     Fluids    Intake/Output Summary (Last 24 hours) at 12/28/2020 1347  Last data filed at 12/27/2020 2000  Gross per 24 hour   Intake 240 ml   Output --   Net 240 ml       Laboratory  Recent Labs     12/27/20  0404 12/27/20  1615 12/28/20  0946   WBC 7.2 6.0 6.7   RBC 2.58* 2.38* 2.83*   HEMOGLOBIN 8.6* 8.1* 9.6*   HEMATOCRIT 26.9* 24.6* 29.5*   .3* 103.4* 104.2*   MCH 33.3* 34.0* 33.9*   MCHC 32.0* 32.9* 32.5*   RDW 50.3* 48.1 49.1   PLATELETCT 209 212 273   MPV 11.4 10.8 11.0     Recent Labs     12/26/20  0430 12/27/20  0404 12/28/20  0946   SODIUM 131* 129* 128*   POTASSIUM 5.0 4.7 3.7   CHLORIDE 101 100 96   CO2 25 24 27   GLUCOSE 95 86 128*   BUN 5* 4* 4*   CREATININE 0.39* 0.32* 0.36*   CALCIUM 6.9* 7.0* 8.3*                   Imaging  No orders to display        Assessment/Plan  S/P exploratory laparotomy  Assessment & Plan  General surgery are the primary service. POD #4 Exploratory laparotomy, adhesiolysis, small bowel resection, new enteroenterostomy creation x2, closure with repair of incisional hernia with use of prior mesh. Postoperative care per general surgery.    COPD (chronic obstructive pulmonary disease) ([facility])  Assessment & Plan  Stable.  Currently saturating 90-92% on room air, 99% on 1LPM nasal cannula. Baseline oxygen saturation unknown. Patient does not use home oxygen. Denies SOB. Continue supplemental oxygen to keep saturation above 92%. Continue as needed albuterol.

## 2020-12-28 NOTE — PROGRESS NOTES
Progress Note    Author:  Colleen Flores MD    Date & Time:   12/28/2020   12:59 PM          Patient ID:             Name:             Lachelle Hilton   YOB: 1960  Age:                 60 y.o.  female   MRN:               [id number]    ________________________________________________________________________  [facility] Service note      Patient appears comfortable  Requesting medication change to oxycodone 5mg q4 hr, which she takes at home  Tolerating full liquids. +flatus, no bm           Exam:       Vitals:    12/28/20 0715   BP: 113/72   Pulse: 89   Resp: 18   Temp: 37.2 °C (98.9 °F)   SpO2: 99%     SpO2  Min: 90 %  Max: 100 %  O2 (LPM)  Min: 0  Max: 6  Temp  Min: 36.2 °C (97.1 °F)  Max: 37.7 °C (99.8 °F)    Intake/Output Summary (Last 24 hours) at 12/28/2020 1259  Last data filed at 12/27/2020 2000  Gross per 24 hour   Intake 240 ml   Output --   Net 240 ml     Output by Drain (mL) 12/26/20 0700 - 12/26/20 1859 12/26/20 1900 - 12/27/20 0659 12/27/20 0700 - 12/27/20 1859 12/27/20 1900 - 12/28/20 0659 12/28/20 0700 - 12/28/20 1259   Patient has no LDAs of requested type attached.       DIET ORDERS (From admission to next 24h)     Start     Ordered    12/28/20 1258  Diet Order Diet: Regular  ALL MEALS     Question:  Diet:  Answer:  Regular    12/28/20 1257                        Physical Exam  Nursing note reviewed.   Constitutional:       Appearance: Normal appearance. Alert, oriented x 4.NAD  HENT:      Head: Normocephalic and atraumatic.      Mouth/Throat:      Mouth: Mucous membranes are moist.   Eyes:      Pupils: Pupils are equal, round, and reactive to light.      No scleral Icterus present  Cardiovascular:      Rate and Rhythm: Normal rate and regular rhythm. Extremities warm, well perfused no edema.   Pulmonary:      Effort: Pulmonary effort is normal.      Breath sounds:  No wheezes or stridor  Abdominal:      General: Abdomen is soft,  Non-distended. Mild TTP  around incision.  Incision well approximated, dry.           Recent Labs     12/26/20  0430 12/27/20  0404 12/28/20  0946   SODIUM 131* 129* 128*   POTASSIUM 5.0 4.7 3.7   CHLORIDE 101 100 96   CO2 25 24 27   BUN 5* 4* 4*   CREATININE 0.39* 0.32* 0.36*   CALCIUM 6.9* 7.0* 8.3*       Recent Labs     12/26/20  0430 12/27/20  0404 12/28/20  0946   ALTSGPT 17 19 21   ASTSGOT 26 22 30   ALKPHOSPHAT 35 38 52   TBILIRUBIN 0.5 0.5 0.8   GLUCOSE 95 86 128*       Recent Labs     12/27/20 0404 12/27/20  1615 12/28/20  0946   RBC 2.58* 2.38* 2.83*   HEMOGLOBIN 8.6* 8.1* 9.6*   HEMATOCRIT 26.9* 24.6* 29.5*   PLATELETCT 209 212 273       Recent Labs     12/26/20 0430 12/27/20  0404 12/27/20  1615 12/28/20  0946   WBC 6.2 7.2 6.0 6.7   NEUTSPOLYS  --  69.00  --  72.90*   LYMPHOCYTES  --  21.10*  --  16.80*   MONOCYTES  --  8.30  --  8.10   EOSINOPHILS  --  1.00  --  1.50   BASOPHILS  --  0.30  --  0.40   ASTSGOT 26 22  --  30   ALTSGPT 17 19  --  21   ALKPHOSPHAT 35 38  --  52   TBILIRUBIN 0.5 0.5  --  0.8         ________________________________________________________________________      Patient Active Problem List   Diagnosis   • Anxiousness   • Vitamin B 12 deficiency   • Lumbar spine pain   • Reactive airway disease without complication   • Coccygeal pain, chronic   • Environmental and seasonal allergies   • Bariatric surgery status   • Osteoporosis without current pathological fracture   • Vitamin D deficiency disease   • High serum parathyroid hormone (PTH)   • Menopause   • COPD (chronic obstructive pulmonary disease) (HCC)   • Gastric fistula       A/P.  Doing much better today. Oozing from incision stopped  H/h trending back up  -Advance diet  -Adjust frequency of oxycodone. D/c dilaudid  -ambulate  -change milk of magnesia to bid  -possibly home later today.

## 2020-12-28 NOTE — CARE PLAN
Problem: Bowel/Gastric:  Goal: Normal bowel function is maintained or improved  Outcome: PROGRESSING AS EXPECTED  Improved nausea reports with oxycodone. Hypoactive BS x 4 at beginning of shift. + flatus. Normoactive BS x 4. Patient tolerating fluids.     Problem: Knowledge Deficit  Goal: Knowledge of disease process/condition, treatment plan, diagnostic tests, and medications will improve  Outcome: PROGRESSING AS EXPECTED  Educated regarding dressing/abdominal binders purpose. Verbalized understanding. Patient stated better than wound vac/prevena.     Problem: Pain Management  Goal: Pain level will decrease to patient's comfort goal  Outcome: PROGRESSING AS EXPECTED  Improved pain control reported with oxycodone per patient report. Patient stating feeling incisional pain.

## 2020-12-28 NOTE — PROGRESS NOTES
"Pt AA&Ox4. Pain rating at 7. Medicated per MAR. Pt c/o dizziness. History of anemia. On 1L NC for comfort. Pt encouraged to ambulate in hallway. Midline incision dressing CDI. ABD binder in place. Abdomen soft and tender. Hypoactive BS. +flatus, -BM, +void. Diet changed to full liquids. Pt instructed to let this RN know if she starts to feel nauseous. Up with standby assist.plan of care discussed. Hourly rounding in place. Call light within reach. All questions answered. /72   Pulse 89   Temp 37.2 °C (98.9 °F)   Resp 18   Ht 1.499 m (4' 11\")   Wt 51.3 kg (113 lb 1.5 oz)   SpO2 99%     "
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
## 2020-12-28 NOTE — CARE PLAN
Problem: Pain Management  Goal: Pain level will decrease to patient's comfort goal  Outcome: PROGRESSING AS EXPECTED   Pain controlled with oral pain meds  Problem: Psychosocial Needs:  Goal: Level of anxiety will decrease  Outcome: PROGRESSING AS EXPECTED   Pt encouraged to ask questions and verbalize concerns.

## 2020-12-29 VITALS
HEART RATE: 101 BPM | RESPIRATION RATE: 18 BRPM | HEIGHT: 59 IN | BODY MASS INDEX: 22.8 KG/M2 | OXYGEN SATURATION: 100 % | DIASTOLIC BLOOD PRESSURE: 83 MMHG | TEMPERATURE: 98.8 F | SYSTOLIC BLOOD PRESSURE: 125 MMHG | WEIGHT: 113.1 LBS

## 2020-12-29 LAB
ANION GAP SERPL CALC-SCNC: 4 MMOL/L (ref 7–16)
BASOPHILS # BLD AUTO: 0.5 % (ref 0–1.8)
BASOPHILS # BLD: 0.03 K/UL (ref 0–0.12)
BUN SERPL-MCNC: 5 MG/DL (ref 8–22)
CALCIUM SERPL-MCNC: 7.9 MG/DL (ref 8.5–10.5)
CHLORIDE SERPL-SCNC: 98 MMOL/L (ref 96–112)
CO2 SERPL-SCNC: 27 MMOL/L (ref 20–33)
CREAT SERPL-MCNC: 0.33 MG/DL (ref 0.5–1.4)
EOSINOPHIL # BLD AUTO: 0.18 K/UL (ref 0–0.51)
EOSINOPHIL NFR BLD: 2.8 % (ref 0–6.9)
ERYTHROCYTE [DISTWIDTH] IN BLOOD BY AUTOMATED COUNT: 47.8 FL (ref 35.9–50)
GLUCOSE SERPL-MCNC: 99 MG/DL (ref 65–99)
HCT VFR BLD AUTO: 24.9 % (ref 37–47)
HGB BLD-MCNC: 8.2 G/DL (ref 12–16)
IMM GRANULOCYTES # BLD AUTO: 0.02 K/UL (ref 0–0.11)
IMM GRANULOCYTES NFR BLD AUTO: 0.3 % (ref 0–0.9)
LYMPHOCYTES # BLD AUTO: 2.03 K/UL (ref 1–4.8)
LYMPHOCYTES NFR BLD: 31.2 % (ref 22–41)
MCH RBC QN AUTO: 33.3 PG (ref 27–33)
MCHC RBC AUTO-ENTMCNC: 32.9 G/DL (ref 33.6–35)
MCV RBC AUTO: 101.2 FL (ref 81.4–97.8)
MONOCYTES # BLD AUTO: 0.76 K/UL (ref 0–0.85)
MONOCYTES NFR BLD AUTO: 11.7 % (ref 0–13.4)
NEUTROPHILS # BLD AUTO: 3.48 K/UL (ref 2–7.15)
NEUTROPHILS NFR BLD: 53.5 % (ref 44–72)
NRBC # BLD AUTO: 0 K/UL
NRBC BLD-RTO: 0 /100 WBC
PLATELET # BLD AUTO: 242 K/UL (ref 164–446)
PMV BLD AUTO: 10.5 FL (ref 9–12.9)
POTASSIUM SERPL-SCNC: 3.9 MMOL/L (ref 3.6–5.5)
RBC # BLD AUTO: 2.46 M/UL (ref 4.2–5.4)
SODIUM SERPL-SCNC: 129 MMOL/L (ref 135–145)
WBC # BLD AUTO: 6.5 K/UL (ref 4.8–10.8)

## 2020-12-29 PROCEDURE — A9270 NON-COVERED ITEM OR SERVICE: HCPCS | Performed by: COLON & RECTAL SURGERY

## 2020-12-29 PROCEDURE — 80048 BASIC METABOLIC PNL TOTAL CA: CPT

## 2020-12-29 PROCEDURE — 700102 HCHG RX REV CODE 250 W/ 637 OVERRIDE(OP): Performed by: SURGERY

## 2020-12-29 PROCEDURE — 700111 HCHG RX REV CODE 636 W/ 250 OVERRIDE (IP): Performed by: FAMILY MEDICINE

## 2020-12-29 PROCEDURE — 700102 HCHG RX REV CODE 250 W/ 637 OVERRIDE(OP): Performed by: COLON & RECTAL SURGERY

## 2020-12-29 PROCEDURE — 85025 COMPLETE CBC W/AUTO DIFF WBC: CPT

## 2020-12-29 PROCEDURE — 99231 SBSQ HOSP IP/OBS SF/LOW 25: CPT | Performed by: FAMILY MEDICINE

## 2020-12-29 PROCEDURE — A9270 NON-COVERED ITEM OR SERVICE: HCPCS | Performed by: SURGERY

## 2020-12-29 PROCEDURE — 36415 COLL VENOUS BLD VENIPUNCTURE: CPT

## 2020-12-29 RX ORDER — ONDANSETRON 4 MG/1
4 TABLET, ORALLY DISINTEGRATING ORAL ONCE
Status: COMPLETED | OUTPATIENT
Start: 2020-12-29 | End: 2020-12-29

## 2020-12-29 RX ADMIN — MAGNESIUM HYDROXIDE 30 ML: 400 SUSPENSION ORAL at 04:15

## 2020-12-29 RX ADMIN — OXYCODONE 5 MG: 5 TABLET ORAL at 04:15

## 2020-12-29 RX ADMIN — FAMOTIDINE 20 MG: 20 TABLET ORAL at 04:15

## 2020-12-29 RX ADMIN — ONDANSETRON 4 MG: 4 TABLET, ORALLY DISINTEGRATING ORAL at 13:27

## 2020-12-29 RX ADMIN — OXYCODONE 5 MG: 5 TABLET ORAL at 08:18

## 2020-12-29 RX ADMIN — OXYCODONE 5 MG: 5 TABLET ORAL at 12:33

## 2020-12-29 ASSESSMENT — PAIN DESCRIPTION - PAIN TYPE: TYPE: ACUTE PAIN

## 2020-12-29 NOTE — DISCHARGE INSTRUCTIONS
Discharge Instructions    Discharged to home by car with relative. Discharged via wheelchair, hospital escort: Yes.  Special equipment needed: Not Applicable    Be sure to schedule a follow-up appointment with your primary care doctor or any specialists as instructed.     Discharge Plan:   Diet Plan: Discussed  Activity Level: Discussed  Confirmed Follow up Appointment: Patient to Call and Schedule Appointment  Confirmed Symptoms Management: Discussed  Medication Reconciliation Updated: Yes  Influenza Vaccine Indication: Patient Refuses    I understand that a diet low in cholesterol, fat, and sodium is recommended for good health. Unless I have been given specific instructions below for another diet, I accept this instruction as my diet prescription.   Other diet: Regular    Special Instructions: None    · Is patient discharged on Warfarin / Coumadin?   No     Depression / Suicide Risk    As you are discharged from this [facility] facility, it is important to learn how to keep safe from harming yourself.    Recognize the warning signs:  · Abrupt changes in personality, positive or negative- including increase in energy   · Giving away possessions  · Change in eating patterns- significant weight changes-  positive or negative  · Change in sleeping patterns- unable to sleep or sleeping all the time   · Unwillingness or inability to communicate  · Depression  · Unusual sadness, discouragement and loneliness  · Talk of wanting to die  · Neglect of personal appearance   · Rebelliousness- reckless behavior  · Withdrawal from people/activities they love  · Confusion- inability to concentrate     If you or a loved one observes any of these behaviors or has concerns about self-harm, here's what you can do:  · Talk about it- your feelings and reasons for harming yourself  · Remove any means that you might use to hurt yourself (examples: pills, rope, extension cords, firearm)  · Get professional help from the community  (Mental Health, Substance Abuse, psychological counseling)  · Do not be alone:Call your Safe Contact- someone whom you trust who will be there for you.  · Call your local CRISIS HOTLINE 246-5026 or 511-853-6614  · Call your local Children's Mobile Crisis Response Team [place] (543) 372-0838 or www.Livekick  · Call the toll free National Suicide Prevention Hotlines   · National Suicide Prevention Lifeline 357-933-ZIUX (7742)  · [employer] Hope Line Network 800-SUICIDE (367-8743)          Open Small Bowel Resection, Care After  This sheet gives you information about how to care for yourself after your procedure. Your health care provider may also give you more specific instructions. If you have problems or questions, contact your health care provider.  What can I expect after the procedure?  After the procedure, it is common to have:  · Pain in your abdomen, especially along your incision. You will be given pain medicines to control this.  · Tiredness. This is a normal part of the recovery process. Your energy level will return to normal over the next several weeks.  · Constipation. You may be given a stool softener to help prevent this.  Follow these instructions at home:  Medicines  · Take over-the-counter and prescription medicines only as told by your health care provider.  · Do not drive or use heavy machinery while taking prescription pain medicine.  · If you were prescribed an antibiotic medicine, use it as told by your health care provider. Do not stop using the antibiotic even if you start to feel better.  Activity  · Return to your normal activities as told by your health care provider. Ask your health care provider what activities are safe for you.  · Do not lift anything that is heavier than 10 lb (4.5 kg) until your health care provider says that it is safe.  · Take frequent rest breaks during the day as needed.  · Try to take short walks every day for the amount of time that your health care  provider suggests.  · Avoid activities that require a lot of effort (are strenuous) for as long as told by your health care provider.  Incision care    · Follow instructions from your health care provider about how to take care of your incision. Make sure you:  ? Wash your hands with soap and water before you change your bandage (dressing). If soap and water are not available, use hand .  ? Change your dressing as told by your health care provider.  ? Leave stitches (sutures), skin glue, or adhesive strips in place. These skin closures may need to stay in place for 2 weeks or longer. If adhesive strip edges start to loosen and curl up, you may trim the loose edges. Do not remove adhesive strips completely unless your health care provider tells you to do that.  · Check your incision area every day for signs of infection. Check for:  ? More redness, swelling, or pain.  ? More fluid or blood.  ? Warmth.  ? Pus or a bad smell.  · Do not take baths, swim, or use a hot tub until your health care provider approves. Ask your health care provider if you can take showers. You may only be allowed to take sponge baths for bathing.  General instructions  · Continue to practice deep breathing and coughing. If it hurts to cough, try holding a pillow against your abdomen as you cough.  · To prevent or treat constipation while you are taking prescription pain medicine, your health care provider may recommend that you:  ? Drink enough fluid to keep your urine clear or pale yellow.  ? Take over-the-counter or prescription medicines.  ? Eat foods that are high in fiber, such as fresh fruits and vegetables, whole grains, and beans.  ? Limit foods that are high in fat and processed sugars, such as fried and sweet foods.  · Do not use any products that contain nicotine or tobacco as told by your health care provider. These include cigarettes and e-cigarettes. If you need help quitting, ask your health care provider.  · Keep all  follow-up visits as told by your health care provider. This is important.  Contact a health care provider if:  · You have pain that is not relieved with medicine.  · You do not feel like eating.  · You feel nauseous or you vomit.  · You have constipation that is not relieved with prescribed stool softeners.  · You have more redness, swelling, or pain around your incision.  · You have more fluid or blood coming from your incision.  · Your incision feels warm to the touch.  · You have pus or a bad smell coming from your incision.  · You have a fever.  Get help right away if:  · Your pain gets worse, even after you take pain medicine.  · Your legs or arms hurt or become red or swollen.  · You have chest pain.  · You have trouble breathing.  This information is not intended to replace advice given to you by your health care provider. Make sure you discuss any questions you have with your health care provider.  Document Released: 05/21/2012 Document Revised: 11/30/2018 Document Reviewed: 09/18/2017  Elsevier Patient Education © 2020 [employer]

## 2020-12-29 NOTE — PROGRESS NOTES
Progress Note    Author:  Colleen Flores MD    Date & Time:   12/29/2020   2:07 PM          Patient ID:             Name:             Lachelle Hilton   YOB: 1960  Age:                 60 y.o.  female   MRN:               [id number]    ________________________________________________________________________  [facility]    Agree with Mr. Frankel assessment and plan    Feeling well  Tolerating PO  +bm  Pain controlled with oral medication.  Ambulating on her own.  H/h has been relatively stable, Vitals have remained stable,yesterday labs showed one unit increase, likely an error.    -okay to d/c home today  -post op instructions provided to patient, all questions answered  -f/u with Dr. Benavides next week       Exam:       Vitals:    12/29/20 1215   BP:    Pulse:    Resp:    Temp:    SpO2: 100%     SpO2  Min: 90 %  Max: 100 %  O2 (LPM)  Min: 0  Max: 6  Temp  Min: 36.2 °C (97.1 °F)  Max: 37.7 °C (99.8 °F)    Intake/Output Summary (Last 24 hours) at 12/29/2020 1407  Last data filed at 12/29/2020 0900  Gross per 24 hour   Intake 600 ml   Output --   Net 600 ml     Output by Drain (mL) 12/27/20 0700 - 12/27/20 1859 12/27/20 1900 - 12/28/20 0659 12/28/20 0700 - 12/28/20 1859 12/28/20 1900 - 12/29/20 0659 12/29/20 0700 - 12/29/20 1407   Patient has no LDAs of requested type attached.       DIET ORDERS (From admission to next 24h)     Start     Ordered    12/28/20 1258  Diet Order Diet: Regular  ALL MEALS     Question:  Diet:  Answer:  Regular    12/28/20 1257                        Physical Exam  Nursing note reviewed.   Constitutional:       Appearance: Normal appearance. Alert, oriented x 4.NAD  HENT:      Head: Normocephalic and atraumatic.      Mouth/Throat:      Mouth: Mucous membranes are moist.   Eyes:      Pupils: Pupils are equal, round, and reactive to light.      No scleral Icterus present  Cardiovascular:      Rate and Rhythm: Normal rate and regular rhythm.  Extremities warm, well perfused no edema.   Pulmonary:      Effort: Pulmonary effort is normal.      Breath sounds: No wheezes or stridor  Abdominal:      General: Abdomen is soft,  non-tender, non-distended. Incision c/d/i    Recent Labs     12/27/20  0404 12/28/20  0946 12/29/20  0034   SODIUM 129* 128* 129*   POTASSIUM 4.7 3.7 3.9   CHLORIDE 100 96 98   CO2 24 27 27   BUN 4* 4* 5*   CREATININE 0.32* 0.36* 0.33*   CALCIUM 7.0* 8.3* 7.9*       Recent Labs     12/27/20  0404 12/28/20  0946 12/29/20  0034   ALTSGPT 19 21  --    ASTSGOT 22 30  --    ALKPHOSPHAT 38 52  --    TBILIRUBIN 0.5 0.8  --    GLUCOSE 86 128* 99       Recent Labs     12/27/20  1615 12/28/20  0946 12/29/20  0034   RBC 2.38* 2.83* 2.46*   HEMOGLOBIN 8.1* 9.6* 8.2*   HEMATOCRIT 24.6* 29.5* 24.9*   PLATELETCT 212 273 242       Recent Labs     12/27/20  0404 12/27/20  1615 12/28/20  0946 12/29/20  0034   WBC 7.2 6.0 6.7 6.5   NEUTSPOLYS 69.00  --  72.90* 53.50   LYMPHOCYTES 21.10*  --  16.80* 31.20   MONOCYTES 8.30  --  8.10 11.70   EOSINOPHILS 1.00  --  1.50 2.80   BASOPHILS 0.30  --  0.40 0.50   ASTSGOT 22  --  30  --    ALTSGPT 19  --  21  --    ALKPHOSPHAT 38  --  52  --    TBILIRUBIN 0.5  --  0.8  --          ________________________________________________________________________      Patient Active Problem List   Diagnosis   • Anxiousness   • Vitamin B 12 deficiency   • Lumbar spine pain   • Reactive airway disease without complication   • Coccygeal pain, chronic   • Environmental and seasonal allergies   • Bariatric surgery status   • Osteoporosis without current pathological fracture   • Vitamin D deficiency disease   • High serum parathyroid hormone (PTH)   • Menopause   • COPD (chronic obstructive pulmonary disease) (HCC)   • S/P exploratory laparotomy

## 2020-12-29 NOTE — CARE PLAN
Problem: Bowel/Gastric:  Goal: Normal bowel function is maintained or improved  Outcome: PROGRESSING AS EXPECTED  Note: Pt self reports a moderate compacted bowel movement this AM and subsequent relief to a lot of her abdominal discomfort.      Problem: Discharge Barriers/Planning  Goal: Patient's continuum of care needs will be met  Outcome: PROGRESSING AS EXPECTED  Note: Pt can d/c when BM and hgb stable per providers.

## 2020-12-29 NOTE — DISCHARGE SUMMARY
DATE OF ADMISSION:  12/24/2020   DATE OF DISCHARGE:  12/28/2020     DATE OF ADMISSION:  12/24/2020     DATE OF DISCHARGE:  12/28/2020     ADMISSION DIAGNOSES:  Partially obstructed small bowel intra-abdominal   adhesions and incisional hernia, chronic obstructive pulmonary disease, status   post exploratory laparotomy, adhesiolysis, small bowel resection and closure   with repair of incisional hernia.     DISCHARGE DIAGNOSES:  Partially obstructed small bowel intra-abdominal   adhesions and incisional hernia, chronic obstructive pulmonary disease, status   post exploratory laparotomy, adhesiolysis, small bowel resection and closure   with repair of incisional hernia.     PROCEDURES:  As stated above.     SURGEON:  Royce Benavides MD     DISPOSITION:  Discharged home.     DIET:  Regular.     ACTIVITY:  No lifting over 20 pounds or strenuous activity for 6 weeks.  Wear   abdominal binder for comfort when ambulating.     DISCHARGE MEDICATIONS:  Resume home medications including oxycodone 5 mg every   4 hours.  The patient was advised to take stool softeners as directed for   constipation.     WOUND CARE:  Okay to shower, keep incision as dry as possible.  Gelacio's and   gauze dressings as needed.  Follow up with Dr. Benavides in one week's time.     HISTORY AND HOSPITAL COURSE:  The patient is a 60-year-old female with history   of bariatric surgery, chronic pain, COPD who was admitted on 12/24/2020 to   undergo gastric fistula repair.  On the 24th, the patient underwent   exploratory laparotomy, lysis of adhesion, small bowel resection and new   enteroenterostomy creation as well as repair/closure of incisional hernia   using previous mesh.  She was admitted for observation and management of her   chronic COPD.  The patient's hospital course included issues with pain   control, desaturation secondary to her COPD.  She also had slight drop in her   hemoglobin on postoperative day 4 from 10.4 to 8.6.  The patient was found to    have significant oozing from her midline incision that was treated with   pressure dressing.  On day of discharge, the patient is tolerating a regular   diet.  Her pain is controlled with her regular oral pain medications.  She is   passing flatus and is being discharged home after her bowel movement.  Her   incision is well approximated, dry, without erythema.  Her most recent H and H   was 9.6/29.5 from 8.1/24.6.  The patient has been ambulating on her own.  Her   sats have been 100% on 1 liter nasal cannula, pulse rate in the 80s, blood   pressure 110s/70s.  Plan is to discharge her home with the above plan and   followup with instruction to call Dr. Benavides's office or return to the   Emergency Department should she experience shortness of breath, chest pain,   severe diffuse abdominal pain or intractable nausea, vomiting.  All questions   answered.  Discharge instructions were reviewed.        ______________________________  MD ARIES Yao/ARACELI/LIZETH    DD:  12/28/2020 13:31  DT:  12/28/2020 17:24    Job#:  [id number]

## 2020-12-29 NOTE — PROGRESS NOTES
Discharging Patient home per physician order.  Discharged with friend.  Demonstrated understanding of follow up appointments, home medications, prescriptions, and nursing care instructions for small bowel resection.  Ambulating without assistance, voiding without difficulty, pain well controlled, tolerating oral medications, oxygen saturation greater than 90% on RA, tolerating diet. All questions answered.  Patient able to state several reasons why to return to the ED or seek medical attention. Belongings with patient at time of discharge.

## 2020-12-29 NOTE — PROGRESS NOTES
Surgical Progress Note    Author: ROCHELLE Ashby Date & Time created: 2020   7:57 AM     Interval Events:  POD#5 Exploratory laparotomy, adhesiolysis, small bowel resection, new enteroenterostomy creation x2, closure with repair of incisional hernia with use of prior mesh.    Subjectively feeling better this morning.  Dizziness seems to have resolved.  She verbalizes that she has been ambulating around the unit multiple times.  Had an additional dose of Milk of mag this morning, reports stomach cramping and sensation that she may have a bowel movement today.  Pain controlled with the change to percocet.  Hgb back down to 8.2 this AM.  Vital signs remain stable.  Tolerating clears well and attempting to hydrate more.  Incision reinforced with gauze, abds, tape.  Minimal dried drainage on dressing.  Incision healing well with staples and no active drainage at this time.   ROS  Hemodynamics:  Temp (24hrs), Av.7 °C (98.1 °F), Min:36.4 °C (97.5 °F), Max:37.3 °C (99.2 °F)  Temperature: 36.5 °C (97.7 °F)  Pulse  Av  Min: 65  Max: 104   Blood Pressure: 108/60     Respiratory:    Respiration: 16, Pulse Oximetry: 99 %        RUL Breath Sounds: Clear, RML Breath Sounds: Clear, RLL Breath Sounds: Diminished, MARGARET Breath Sounds: Clear, LLL Breath Sounds: Diminished  Neuro:  GCS       Fluids:    Intake/Output Summary (Last 24 hours) at 2020 6297  Last data filed at 2020 1955  Gross per 24 hour   Intake 360 ml   Output --   Net 360 ml        Current Diet Order   Procedures   • Diet Order Diet: Regular     Physical Exam  Labs:  Recent Results (from the past 24 hour(s))   Comp Metabolic Panel (CMP)    Collection Time: 20  9:46 AM   Result Value Ref Range    Sodium 128 (L) 135 - 145 mmol/L    Potassium 3.7 3.6 - 5.5 mmol/L    Chloride 96 96 - 112 mmol/L    Co2 27 20 - 33 mmol/L    Anion Gap 5.0 (L) 7.0 - 16.0    Glucose 128 (H) 65 - 99 mg/dL    Bun 4 (L) 8 - 22 mg/dL    Creatinine 0.36 (L)  0.50 - 1.40 mg/dL    Calcium 8.3 (L) 8.5 - 10.5 mg/dL    AST(SGOT) 30 12 - 45 U/L    ALT(SGPT) 21 2 - 50 U/L    Alkaline Phosphatase 52 30 - 99 U/L    Total Bilirubin 0.8 0.1 - 1.5 mg/dL    Albumin 3.2 3.2 - 4.9 g/dL    Total Protein 5.5 (L) 6.0 - 8.2 g/dL    Globulin 2.3 1.9 - 3.5 g/dL    A-G Ratio 1.4 g/dL   CBC WITH DIFFERENTIAL    Collection Time: 12/28/20  9:46 AM   Result Value Ref Range    WBC 6.7 4.8 - 10.8 K/uL    RBC 2.83 (L) 4.20 - 5.40 M/uL    Hemoglobin 9.6 (L) 12.0 - 16.0 g/dL    Hematocrit 29.5 (L) 37.0 - 47.0 %    .2 (H) 81.4 - 97.8 fL    MCH 33.9 (H) 27.0 - 33.0 pg    MCHC 32.5 (L) 33.6 - 35.0 g/dL    RDW 49.1 35.9 - 50.0 fL    Platelet Count 273 164 - 446 K/uL    MPV 11.0 9.0 - 12.9 fL    Neutrophils-Polys 72.90 (H) 44.00 - 72.00 %    Lymphocytes 16.80 (L) 22.00 - 41.00 %    Monocytes 8.10 0.00 - 13.40 %    Eosinophils 1.50 0.00 - 6.90 %    Basophils 0.40 0.00 - 1.80 %    Immature Granulocytes 0.30 0.00 - 0.90 %    Nucleated RBC 0.00 /100 WBC    Neutrophils (Absolute) 4.87 2.00 - 7.15 K/uL    Lymphs (Absolute) 1.12 1.00 - 4.80 K/uL    Monos (Absolute) 0.54 0.00 - 0.85 K/uL    Eos (Absolute) 0.10 0.00 - 0.51 K/uL    Baso (Absolute) 0.03 0.00 - 0.12 K/uL    Immature Granulocytes (abs) 0.02 0.00 - 0.11 K/uL    NRBC (Absolute) 0.00 K/uL   ESTIMATED GFR    Collection Time: 12/28/20  9:46 AM   Result Value Ref Range    GFR If African American >60 >60 mL/min/1.73 m 2    GFR If Non African American >60 >60 mL/min/1.73 m 2   CBC WITH DIFFERENTIAL    Collection Time: 12/29/20 12:34 AM   Result Value Ref Range    WBC 6.5 4.8 - 10.8 K/uL    RBC 2.46 (L) 4.20 - 5.40 M/uL    Hemoglobin 8.2 (L) 12.0 - 16.0 g/dL    Hematocrit 24.9 (L) 37.0 - 47.0 %    .2 (H) 81.4 - 97.8 fL    MCH 33.3 (H) 27.0 - 33.0 pg    MCHC 32.9 (L) 33.6 - 35.0 g/dL    RDW 47.8 35.9 - 50.0 fL    Platelet Count 242 164 - 446 K/uL    MPV 10.5 9.0 - 12.9 fL    Neutrophils-Polys 53.50 44.00 - 72.00 %    Lymphocytes 31.20 22.00 -  41.00 %    Monocytes 11.70 0.00 - 13.40 %    Eosinophils 2.80 0.00 - 6.90 %    Basophils 0.50 0.00 - 1.80 %    Immature Granulocytes 0.30 0.00 - 0.90 %    Nucleated RBC 0.00 /100 WBC    Neutrophils (Absolute) 3.48 2.00 - 7.15 K/uL    Lymphs (Absolute) 2.03 1.00 - 4.80 K/uL    Monos (Absolute) 0.76 0.00 - 0.85 K/uL    Eos (Absolute) 0.18 0.00 - 0.51 K/uL    Baso (Absolute) 0.03 0.00 - 0.12 K/uL    Immature Granulocytes (abs) 0.02 0.00 - 0.11 K/uL    NRBC (Absolute) 0.00 K/uL   Basic Metabolic Panel    Collection Time: 12/29/20 12:34 AM   Result Value Ref Range    Sodium 129 (L) 135 - 145 mmol/L    Potassium 3.9 3.6 - 5.5 mmol/L    Chloride 98 96 - 112 mmol/L    Co2 27 20 - 33 mmol/L    Glucose 99 65 - 99 mg/dL    Bun 5 (L) 8 - 22 mg/dL    Creatinine 0.33 (L) 0.50 - 1.40 mg/dL    Calcium 7.9 (L) 8.5 - 10.5 mg/dL    Anion Gap 4.0 (L) 7.0 - 16.0   ESTIMATED GFR    Collection Time: 12/29/20 12:34 AM   Result Value Ref Range    GFR If African American >60 >60 mL/min/1.73 m 2    GFR If Non African American >60 >60 mL/min/1.73 m 2     Medical Decision Making, by Problem:  Active Hospital Problems    Diagnosis   • S/P exploratory laparotomy [Z98.890]   • COPD (chronic obstructive pulmonary disease) ([facility]) [J44.9]     Plan:  VSS.  Dizziness seems to have resolved.  Hgb back down to 8.2 from 9.6 yesterday.  Feels subjectively better with some abdominal cramping today, feels like she may have a BM.  Educated to keep up with her PO intake and ambulation in the halls.  Continue to hold lovenox.  Hopefully discharge today but may need to stay for abnormal labs, VS, lack of bowel movement.  Discussed with patient to continue taking her normal 10mg Percocet at home and follow up in our office in 1 week if able to discharge home this afternoon. Discussed with Dr. Benavides and [facility] covering for Dr. Benavides.     Quality Measures:  Quality-Core Measures    Discussed patient condition with RN, Patient and [facility]  Surgical

## 2020-12-29 NOTE — PROGRESS NOTES
Pt A&Ox4, appears mildly anxious this AM.  C/o pain 8/10, medicated per MAR. Educated pt on narcotics and constipation since pt hasn't had a bowel movement. Encouraged hydration and ambulation today.   Abdomen rounded, MLI with large dressing in place.   Tolerating diet, denies n/v. Hypoactive bowel sounds, + flatus, LBM PTA.  Saturating >90% on RA, pt prefers to place 1L NOC for comfort.  Pt ambulates SBA/independently with steady gait, pt reports improvement to dizziness but reports intermittent still.  Updated on plan of care. Safety education provided. Bed locked in low. Call light within reach. Rounding in place.

## 2020-12-30 NOTE — PROGRESS NOTES
Hospital Medicine Daily Progress Note    Date of Service  12/29/2020    Chief Complaint/Reason for consult:  60 y.o. female transferred from [facility] on 12/24/2020 for gastric fistula repair.  She has a history of bariatric surgery, COPD, anxiety, and chronic pain.  Medical service were consulted for COPD management     Hospital Course  Patient underwent exploratory laparotomy, adhesion lysis, small bowel resection, with a new enteroenterostomy creation x 2 on 12/24/2020.      Interval Problem Update  POD # 4. Currently saturating 90-92% on room air nasal cannula.  NG tube was removed on 12/25/2020. Tolerating clear liquid diet. Hgb is 8.6.   12/29: Resting in bed comfortably.  Was on 2 L oxygen via nasal cannula which been weaned off and patient was saturating well on room air.  Was ambulated with no desaturation noted.  Pain was fairly controlled.  Surgery planning for discharge home today.    Consultants/Specialty  Medical services on consult for COPD managment     Code Status  Full Code     Disposition  Home today per surgery.     Review of Systems  Review of Systems   Constitutional: Negative.    HENT: Negative.    Eyes: Negative.    Respiratory: Negative.  Negative for shortness of breath and wheezing.    Cardiovascular: Negative.    Gastrointestinal: Negative for nausea.        Surgical pain is stable   Genitourinary: Negative.    Musculoskeletal: Negative.    Skin: Negative.    Neurological: Negative.  Negative for weakness.   Endo/Heme/Allergies: Negative.    Psychiatric/Behavioral: Negative.         Physical Exam  Temp:  [36.4 °C (97.5 °F)-37.1 °C (98.8 °F)] 37.1 °C (98.8 °F)  Pulse:  [] 101  Resp:  [14-18] 18  BP: (108-137)/(60-83) 125/83  SpO2:  [97 %-100 %] 100 %    Physical Exam  Constitutional:       General: She is not in acute distress.  HENT:      Head: Normocephalic.      Nose:      Mouth: Mucous membranes are moist.   Eyes:      Extraocular Movements: Extraocular movements intact.       Pupils: Pupils are equal, round, and reactive to light.   Neck:      Musculoskeletal: Normal range of motion.   Cardiovascular:      Rate and Rhythm: Normal rate.   Pulmonary:      Effort: Pulmonary effort is normal.      Breath sounds: Normal breath sounds. No wheezing.   Abdominal:      Comments: +BS, soft, ND  Musculoskeletal:      Right lower leg: No edema.      Left lower leg: No edema.   Skin:     General: Skin Pale     Comments: Abdominal incision intact  Neurological:      Mental Status: She is alert.   Psychiatric:         Mood and Affect: Mood normal.     Fluids    Intake/Output Summary (Last 24 hours) at 12/29/2020 1638  Last data filed at 12/29/2020 0900  Gross per 24 hour   Intake 600 ml   Output --   Net 600 ml       Laboratory  Recent Labs     12/27/20  1615 12/28/20  0946 12/29/20  0034   WBC 6.0 6.7 6.5   RBC 2.38* 2.83* 2.46*   HEMOGLOBIN 8.1* 9.6* 8.2*   HEMATOCRIT 24.6* 29.5* 24.9*   .4* 104.2* 101.2*   MCH 34.0* 33.9* 33.3*   MCHC 32.9* 32.5* 32.9*   RDW 48.1 49.1 47.8   PLATELETCT 212 273 242   MPV 10.8 11.0 10.5     Recent Labs     12/27/20  0404 12/28/20  0946 12/29/20  0034   SODIUM 129* 128* 129*   POTASSIUM 4.7 3.7 3.9   CHLORIDE 100 96 98   CO2 24 27 27   GLUCOSE 86 128* 99   BUN 4* 4* 5*   CREATININE 0.32* 0.36* 0.33*   CALCIUM 7.0* 8.3* 7.9*                   Imaging  No orders to display        Assessment/Plan  S/P exploratory laparotomy  Assessment & Plan  General surgery are the primary service. POD #4 Exploratory laparotomy, adhesiolysis, small bowel resection, new enteroenterostomy creation x2, closure with repair of incisional hernia with use of prior mesh. Postoperative care per general surgery.    COPD (chronic obstructive pulmonary disease) ([facility])  Assessment & Plan  Stable.  Currently saturating 90-92% on room air, 99% on 1LPM nasal cannula. Baseline oxygen saturation unknown. Patient does not use home oxygen. Denies SOB. Continue supplemental oxygen to keep  saturation above 92%. Continue as needed albuterol.  12/29: Patient was weaned off oxygen.  Ambulatory oxygen test was done and patient was saturating well.  Not qualifying for home oxygen at this point.

## 2021-01-09 ENCOUNTER — APPOINTMENT (OUTPATIENT)
Dept: RADIOLOGY | Facility: MEDICAL CENTER | Age: 61
DRG: 862 | End: 2021-01-09
Attending: EMERGENCY MEDICINE
Payer: COMMERCIAL

## 2021-01-09 ENCOUNTER — HOSPITAL ENCOUNTER (INPATIENT)
Facility: MEDICAL CENTER | Age: 61
LOS: 5 days | DRG: 862 | End: 2021-01-14
Attending: EMERGENCY MEDICINE | Admitting: INTERNAL MEDICINE
Payer: COMMERCIAL

## 2021-01-09 DIAGNOSIS — E87.1 HYPONATREMIA: ICD-10-CM

## 2021-01-09 DIAGNOSIS — T81.49XA WOUND INFECTION AFTER SURGERY: ICD-10-CM

## 2021-01-09 PROBLEM — K59.00 CN (CONSTIPATION): Status: ACTIVE | Noted: 2021-01-09

## 2021-01-09 PROBLEM — K82.0: Status: ACTIVE | Noted: 2021-01-09

## 2021-01-09 PROBLEM — A41.9 SEPSIS (HCC): Status: ACTIVE | Noted: 2021-01-09

## 2021-01-09 LAB
ALBUMIN SERPL BCP-MCNC: 2.9 G/DL (ref 3.2–4.9)
ALBUMIN/GLOB SERPL: 1.3 G/DL
ALP SERPL-CCNC: 61 U/L (ref 30–99)
ALT SERPL-CCNC: 12 U/L (ref 2–50)
ANION GAP SERPL CALC-SCNC: 6 MMOL/L (ref 7–16)
APPEARANCE UR: CLEAR
AST SERPL-CCNC: 11 U/L (ref 12–45)
BASOPHILS # BLD AUTO: 0.2 % (ref 0–1.8)
BASOPHILS # BLD: 0.03 K/UL (ref 0–0.12)
BILIRUB SERPL-MCNC: 0.8 MG/DL (ref 0.1–1.5)
BILIRUB UR QL STRIP.AUTO: NEGATIVE
BUN SERPL-MCNC: 9 MG/DL (ref 8–22)
CALCIUM SERPL-MCNC: 7.6 MG/DL (ref 8.5–10.5)
CHLORIDE SERPL-SCNC: 95 MMOL/L (ref 96–112)
CO2 SERPL-SCNC: 26 MMOL/L (ref 20–33)
COLOR UR: YELLOW
COVID ORDER STATUS COVID19: NORMAL
CREAT SERPL-MCNC: 0.38 MG/DL (ref 0.5–1.4)
EOSINOPHIL # BLD AUTO: 0.83 K/UL (ref 0–0.51)
EOSINOPHIL NFR BLD: 6.2 % (ref 0–6.9)
ERYTHROCYTE [DISTWIDTH] IN BLOOD BY AUTOMATED COUNT: 52.4 FL (ref 35.9–50)
FLUAV RNA SPEC QL NAA+PROBE: NEGATIVE
FLUBV RNA SPEC QL NAA+PROBE: NEGATIVE
GLOBULIN SER CALC-MCNC: 2.3 G/DL (ref 1.9–3.5)
GLUCOSE SERPL-MCNC: 111 MG/DL (ref 65–99)
GLUCOSE UR STRIP.AUTO-MCNC: NEGATIVE MG/DL
HCT VFR BLD AUTO: 32.1 % (ref 37–47)
HGB BLD-MCNC: 10.6 G/DL (ref 12–16)
IMM GRANULOCYTES # BLD AUTO: 0.08 K/UL (ref 0–0.11)
IMM GRANULOCYTES NFR BLD AUTO: 0.6 % (ref 0–0.9)
KETONES UR STRIP.AUTO-MCNC: ABNORMAL MG/DL
LACTATE BLD-SCNC: 1 MMOL/L (ref 0.5–2)
LEUKOCYTE ESTERASE UR QL STRIP.AUTO: NEGATIVE
LYMPHOCYTES # BLD AUTO: 1 K/UL (ref 1–4.8)
LYMPHOCYTES NFR BLD: 7.5 % (ref 22–41)
MAGNESIUM SERPL-MCNC: 2.3 MG/DL (ref 1.5–2.5)
MCH RBC QN AUTO: 33.8 PG (ref 27–33)
MCHC RBC AUTO-ENTMCNC: 33 G/DL (ref 33.6–35)
MCV RBC AUTO: 102.2 FL (ref 81.4–97.8)
MICRO URNS: ABNORMAL
MONOCYTES # BLD AUTO: 1.14 K/UL (ref 0–0.85)
MONOCYTES NFR BLD AUTO: 8.5 % (ref 0–13.4)
NEUTROPHILS # BLD AUTO: 10.3 K/UL (ref 2–7.15)
NEUTROPHILS NFR BLD: 77 % (ref 44–72)
NITRITE UR QL STRIP.AUTO: NEGATIVE
NRBC # BLD AUTO: 0 K/UL
NRBC BLD-RTO: 0 /100 WBC
PH UR STRIP.AUTO: 7 [PH] (ref 5–8)
PLATELET # BLD AUTO: 525 K/UL (ref 164–446)
PMV BLD AUTO: 9.9 FL (ref 9–12.9)
POTASSIUM SERPL-SCNC: 4 MMOL/L (ref 3.6–5.5)
PROT SERPL-MCNC: 5.2 G/DL (ref 6–8.2)
PROT UR QL STRIP: NEGATIVE MG/DL
RBC # BLD AUTO: 3.14 M/UL (ref 4.2–5.4)
RBC UR QL AUTO: NEGATIVE
SARS-COV-2 RNA RESP QL NAA+PROBE: NOTDETECTED
SODIUM SERPL-SCNC: 127 MMOL/L (ref 135–145)
SP GR UR STRIP.AUTO: <=1.005
SPECIMEN SOURCE: NORMAL
UROBILINOGEN UR STRIP.AUTO-MCNC: 1 MG/DL
WBC # BLD AUTO: 13.4 K/UL (ref 4.8–10.8)

## 2021-01-09 PROCEDURE — 96365 THER/PROPH/DIAG IV INF INIT: CPT

## 2021-01-09 PROCEDURE — 87502 INFLUENZA DNA AMP PROBE: CPT

## 2021-01-09 PROCEDURE — A9270 NON-COVERED ITEM OR SERVICE: HCPCS | Performed by: INTERNAL MEDICINE

## 2021-01-09 PROCEDURE — 700111 HCHG RX REV CODE 636 W/ 250 OVERRIDE (IP): Performed by: EMERGENCY MEDICINE

## 2021-01-09 PROCEDURE — 83735 ASSAY OF MAGNESIUM: CPT

## 2021-01-09 PROCEDURE — 80053 COMPREHEN METABOLIC PANEL: CPT

## 2021-01-09 PROCEDURE — 700111 HCHG RX REV CODE 636 W/ 250 OVERRIDE (IP): Performed by: INTERNAL MEDICINE

## 2021-01-09 PROCEDURE — 36415 COLL VENOUS BLD VENIPUNCTURE: CPT

## 2021-01-09 PROCEDURE — 83605 ASSAY OF LACTIC ACID: CPT

## 2021-01-09 PROCEDURE — 87205 SMEAR GRAM STAIN: CPT

## 2021-01-09 PROCEDURE — 99285 EMERGENCY DEPT VISIT HI MDM: CPT

## 2021-01-09 PROCEDURE — U0003 INFECTIOUS AGENT DETECTION BY NUCLEIC ACID (DNA OR RNA); SEVERE ACUTE RESPIRATORY SYNDROME CORONAVIRUS 2 (SARS-COV-2) (CORONAVIRUS DISEASE [COVID-19]), AMPLIFIED PROBE TECHNIQUE, MAKING USE OF HIGH THROUGHPUT TECHNOLOGIES AS DESCRIBED BY CMS-2020-01-R: HCPCS

## 2021-01-09 PROCEDURE — 770006 HCHG ROOM/CARE - MED/SURG/GYN SEMI*

## 2021-01-09 PROCEDURE — 87147 CULTURE TYPE IMMUNOLOGIC: CPT

## 2021-01-09 PROCEDURE — 81003 URINALYSIS AUTO W/O SCOPE: CPT

## 2021-01-09 PROCEDURE — 700102 HCHG RX REV CODE 250 W/ 637 OVERRIDE(OP): Performed by: INTERNAL MEDICINE

## 2021-01-09 PROCEDURE — 85025 COMPLETE CBC W/AUTO DIFF WBC: CPT

## 2021-01-09 PROCEDURE — 87186 SC STD MICRODIL/AGAR DIL: CPT

## 2021-01-09 PROCEDURE — 700105 HCHG RX REV CODE 258: Performed by: INTERNAL MEDICINE

## 2021-01-09 PROCEDURE — 87070 CULTURE OTHR SPECIMN AEROBIC: CPT

## 2021-01-09 PROCEDURE — 87040 BLOOD CULTURE FOR BACTERIA: CPT

## 2021-01-09 PROCEDURE — 71045 X-RAY EXAM CHEST 1 VIEW: CPT

## 2021-01-09 PROCEDURE — 99223 1ST HOSP IP/OBS HIGH 75: CPT | Performed by: INTERNAL MEDICINE

## 2021-01-09 PROCEDURE — 87077 CULTURE AEROBIC IDENTIFY: CPT

## 2021-01-09 PROCEDURE — 87086 URINE CULTURE/COLONY COUNT: CPT

## 2021-01-09 PROCEDURE — 96375 TX/PRO/DX INJ NEW DRUG ADDON: CPT

## 2021-01-09 PROCEDURE — U0005 INFEC AGEN DETEC AMPLI PROBE: HCPCS

## 2021-01-09 RX ORDER — SODIUM CHLORIDE 9 MG/ML
INJECTION, SOLUTION INTRAVENOUS CONTINUOUS
Status: DISCONTINUED | OUTPATIENT
Start: 2021-01-09 | End: 2021-01-11

## 2021-01-09 RX ORDER — ONDANSETRON 2 MG/ML
4 INJECTION INTRAMUSCULAR; INTRAVENOUS ONCE
Status: COMPLETED | OUTPATIENT
Start: 2021-01-09 | End: 2021-01-09

## 2021-01-09 RX ORDER — MONTELUKAST SODIUM 10 MG/1
TABLET ORAL
COMMUNITY
End: 2021-01-09

## 2021-01-09 RX ORDER — ACETAMINOPHEN 325 MG/1
650 TABLET ORAL EVERY 6 HOURS PRN
Status: DISCONTINUED | OUTPATIENT
Start: 2021-01-09 | End: 2021-01-14 | Stop reason: HOSPADM

## 2021-01-09 RX ORDER — ALPRAZOLAM 0.5 MG/1
0.5 TABLET ORAL EVERY 4 HOURS PRN
Status: DISCONTINUED | OUTPATIENT
Start: 2021-01-09 | End: 2021-01-14 | Stop reason: HOSPADM

## 2021-01-09 RX ORDER — MORPHINE SULFATE 4 MG/ML
1 INJECTION, SOLUTION INTRAMUSCULAR; INTRAVENOUS ONCE
Status: COMPLETED | OUTPATIENT
Start: 2021-01-09 | End: 2021-01-09

## 2021-01-09 RX ORDER — POLYETHYLENE GLYCOL 3350 17 G/17G
17 POWDER, FOR SOLUTION ORAL DAILY
COMMUNITY
End: 2021-12-20

## 2021-01-09 RX ORDER — ALBUTEROL SULFATE 90 UG/1
2 AEROSOL, METERED RESPIRATORY (INHALATION) EVERY 6 HOURS PRN
Status: DISCONTINUED | OUTPATIENT
Start: 2021-01-09 | End: 2021-01-14 | Stop reason: HOSPADM

## 2021-01-09 RX ORDER — SODIUM CHLORIDE 9 MG/ML
1000 INJECTION, SOLUTION INTRAVENOUS ONCE
Status: COMPLETED | OUTPATIENT
Start: 2021-01-10 | End: 2021-01-10

## 2021-01-09 RX ORDER — DOCUSATE SODIUM 100 MG/1
100 CAPSULE, LIQUID FILLED ORAL 2 TIMES DAILY
Status: DISCONTINUED | OUTPATIENT
Start: 2021-01-09 | End: 2021-01-14 | Stop reason: HOSPADM

## 2021-01-09 RX ORDER — CAYENNE 450 MG
1 CAPSULE ORAL DAILY
COMMUNITY
End: 2021-12-20

## 2021-01-09 RX ORDER — SENNOSIDES A AND B 8.6 MG/1
8.6 TABLET, FILM COATED ORAL
Status: DISCONTINUED | OUTPATIENT
Start: 2021-01-09 | End: 2021-01-14 | Stop reason: HOSPADM

## 2021-01-09 RX ORDER — OXYCODONE AND ACETAMINOPHEN 10; 325 MG/1; MG/1
1 TABLET ORAL
COMMUNITY
Start: 2020-12-10 | End: 2021-01-09

## 2021-01-09 RX ORDER — MORPHINE SULFATE 4 MG/ML
4 INJECTION, SOLUTION INTRAMUSCULAR; INTRAVENOUS ONCE
Status: COMPLETED | OUTPATIENT
Start: 2021-01-09 | End: 2021-01-09

## 2021-01-09 RX ORDER — CALCIUM CARBONATE 500(1250)
TABLET ORAL
COMMUNITY
End: 2021-01-09

## 2021-01-09 RX ORDER — ONDANSETRON 2 MG/ML
4 INJECTION INTRAMUSCULAR; INTRAVENOUS EVERY 4 HOURS PRN
Status: DISCONTINUED | OUTPATIENT
Start: 2021-01-09 | End: 2021-01-14 | Stop reason: HOSPADM

## 2021-01-09 RX ORDER — OMEPRAZOLE 20 MG/1
20 CAPSULE, DELAYED RELEASE ORAL DAILY
Status: DISCONTINUED | OUTPATIENT
Start: 2021-01-10 | End: 2021-01-09

## 2021-01-09 RX ADMIN — PIPERACILLIN AND TAZOBACTAM 3.38 G: 3; .375 INJECTION, POWDER, LYOPHILIZED, FOR SOLUTION INTRAVENOUS; PARENTERAL at 22:53

## 2021-01-09 RX ADMIN — ACETAMINOPHEN 650 MG: 325 TABLET, FILM COATED ORAL at 22:06

## 2021-01-09 RX ADMIN — ONDANSETRON 4 MG: 2 INJECTION INTRAMUSCULAR; INTRAVENOUS at 17:47

## 2021-01-09 RX ADMIN — VANCOMYCIN HYDROCHLORIDE 1250 MG: 500 INJECTION, POWDER, LYOPHILIZED, FOR SOLUTION INTRAVENOUS at 19:48

## 2021-01-09 RX ADMIN — ALPRAZOLAM 0.5 MG: 0.5 TABLET ORAL at 21:06

## 2021-01-09 RX ADMIN — SODIUM CHLORIDE 1000 ML: 9 INJECTION, SOLUTION INTRAVENOUS at 23:41

## 2021-01-09 RX ADMIN — SENNOSIDES 8.6 MG: 8.6 TABLET, FILM COATED ORAL at 22:06

## 2021-01-09 RX ADMIN — MORPHINE SULFATE 4 MG: 4 INJECTION INTRAVENOUS at 17:46

## 2021-01-09 RX ADMIN — ONDANSETRON 4 MG: 2 INJECTION INTRAMUSCULAR; INTRAVENOUS at 22:21

## 2021-01-09 RX ADMIN — MORPHINE SULFATE 1 MG: 4 INJECTION INTRAVENOUS at 22:20

## 2021-01-09 RX ADMIN — DOCUSATE SODIUM 100 MG: 100 CAPSULE, LIQUID FILLED ORAL at 22:06

## 2021-01-09 RX ADMIN — SODIUM CHLORIDE: 9 INJECTION, SOLUTION INTRAVENOUS at 22:54

## 2021-01-09 ASSESSMENT — ENCOUNTER SYMPTOMS
RESPIRATORY NEGATIVE: 1
CHILLS: 0
FEVER: 0
VOMITING: 1
NAUSEA: 1
NEUROLOGICAL NEGATIVE: 1
CARDIOVASCULAR NEGATIVE: 1
DIARRHEA: 0
EYES NEGATIVE: 1
NERVOUS/ANXIOUS: 1
CONSTIPATION: 1
MUSCULOSKELETAL NEGATIVE: 1

## 2021-01-09 ASSESSMENT — LIFESTYLE VARIABLES: DO YOU DRINK ALCOHOL: NO

## 2021-01-09 ASSESSMENT — PAIN DESCRIPTION - PAIN TYPE: TYPE: ACUTE PAIN;SURGICAL PAIN

## 2021-01-09 ASSESSMENT — FIBROSIS 4 INDEX
FIB4 SCORE: 1.62
FIB4 SCORE: 0.36

## 2021-01-10 PROBLEM — E87.1 HYPONATREMIA: Status: ACTIVE | Noted: 2021-01-10

## 2021-01-10 PROBLEM — D64.9 ANEMIA: Status: ACTIVE | Noted: 2021-01-10

## 2021-01-10 LAB
ANION GAP SERPL CALC-SCNC: 6 MMOL/L (ref 7–16)
BASOPHILS # BLD AUTO: 0.4 % (ref 0–1.8)
BASOPHILS # BLD: 0.04 K/UL (ref 0–0.12)
BUN SERPL-MCNC: 7 MG/DL (ref 8–22)
CALCIUM SERPL-MCNC: 7 MG/DL (ref 8.5–10.5)
CHLORIDE SERPL-SCNC: 99 MMOL/L (ref 96–112)
CO2 SERPL-SCNC: 24 MMOL/L (ref 20–33)
CREAT SERPL-MCNC: 0.36 MG/DL (ref 0.5–1.4)
EOSINOPHIL # BLD AUTO: 0.88 K/UL (ref 0–0.51)
EOSINOPHIL NFR BLD: 8.1 % (ref 0–6.9)
ERYTHROCYTE [DISTWIDTH] IN BLOOD BY AUTOMATED COUNT: 52.2 FL (ref 35.9–50)
FERRITIN SERPL-MCNC: 316 NG/ML (ref 10–291)
FOLATE SERPL-MCNC: 18.9 NG/ML
GLUCOSE SERPL-MCNC: 98 MG/DL (ref 65–99)
GRAM STN SPEC: NORMAL
HCT VFR BLD AUTO: 26.3 % (ref 37–47)
HEMOCCULT STL QL: NEGATIVE
HGB BLD-MCNC: 8.6 G/DL (ref 12–16)
IMM GRANULOCYTES # BLD AUTO: 0.05 K/UL (ref 0–0.11)
IMM GRANULOCYTES NFR BLD AUTO: 0.5 % (ref 0–0.9)
IRON SATN MFR SERPL: 8 % (ref 15–55)
IRON SERPL-MCNC: 10 UG/DL (ref 40–170)
LACTATE BLD-SCNC: 0.7 MMOL/L (ref 0.5–2)
LYMPHOCYTES # BLD AUTO: 0.86 K/UL (ref 1–4.8)
LYMPHOCYTES NFR BLD: 7.9 % (ref 22–41)
MCH RBC QN AUTO: 33.7 PG (ref 27–33)
MCHC RBC AUTO-ENTMCNC: 32.7 G/DL (ref 33.6–35)
MCV RBC AUTO: 103.1 FL (ref 81.4–97.8)
MONOCYTES # BLD AUTO: 0.98 K/UL (ref 0–0.85)
MONOCYTES NFR BLD AUTO: 9 % (ref 0–13.4)
NEUTROPHILS # BLD AUTO: 8.1 K/UL (ref 2–7.15)
NEUTROPHILS NFR BLD: 74.1 % (ref 44–72)
NRBC # BLD AUTO: 0 K/UL
NRBC BLD-RTO: 0 /100 WBC
PLATELET # BLD AUTO: 438 K/UL (ref 164–446)
PMV BLD AUTO: 9.9 FL (ref 9–12.9)
POTASSIUM SERPL-SCNC: 3.7 MMOL/L (ref 3.6–5.5)
RBC # BLD AUTO: 2.55 M/UL (ref 4.2–5.4)
SIGNIFICANT IND 70042: NORMAL
SITE SITE: NORMAL
SODIUM SERPL-SCNC: 129 MMOL/L (ref 135–145)
SOURCE SOURCE: NORMAL
TIBC SERPL-MCNC: 127 UG/DL (ref 250–450)
TRANSFERRIN SERPL-MCNC: 101 MG/DL (ref 200–370)
UIBC SERPL-MCNC: 117 UG/DL (ref 110–370)
VIT B12 SERPL-MCNC: 2589 PG/ML (ref 211–911)
WBC # BLD AUTO: 10.9 K/UL (ref 4.8–10.8)

## 2021-01-10 PROCEDURE — 83540 ASSAY OF IRON: CPT

## 2021-01-10 PROCEDURE — 302043 TAPE, HYPAFIX: Performed by: SURGERY

## 2021-01-10 PROCEDURE — A9270 NON-COVERED ITEM OR SERVICE: HCPCS | Performed by: INTERNAL MEDICINE

## 2021-01-10 PROCEDURE — 36415 COLL VENOUS BLD VENIPUNCTURE: CPT

## 2021-01-10 PROCEDURE — 700105 HCHG RX REV CODE 258: Performed by: INTERNAL MEDICINE

## 2021-01-10 PROCEDURE — 770006 HCHG ROOM/CARE - MED/SURG/GYN SEMI*

## 2021-01-10 PROCEDURE — 83605 ASSAY OF LACTIC ACID: CPT

## 2021-01-10 PROCEDURE — 82746 ASSAY OF FOLIC ACID SERUM: CPT

## 2021-01-10 PROCEDURE — 83550 IRON BINDING TEST: CPT

## 2021-01-10 PROCEDURE — 700111 HCHG RX REV CODE 636 W/ 250 OVERRIDE (IP): Performed by: INTERNAL MEDICINE

## 2021-01-10 PROCEDURE — 82607 VITAMIN B-12: CPT

## 2021-01-10 PROCEDURE — 80048 BASIC METABOLIC PNL TOTAL CA: CPT

## 2021-01-10 PROCEDURE — 82272 OCCULT BLD FECES 1-3 TESTS: CPT

## 2021-01-10 PROCEDURE — 700102 HCHG RX REV CODE 250 W/ 637 OVERRIDE(OP): Performed by: INTERNAL MEDICINE

## 2021-01-10 PROCEDURE — 85025 COMPLETE CBC W/AUTO DIFF WBC: CPT

## 2021-01-10 PROCEDURE — 99232 SBSQ HOSP IP/OBS MODERATE 35: CPT | Performed by: INTERNAL MEDICINE

## 2021-01-10 PROCEDURE — 82728 ASSAY OF FERRITIN: CPT

## 2021-01-10 PROCEDURE — 84466 ASSAY OF TRANSFERRIN: CPT

## 2021-01-10 RX ORDER — LACTULOSE 20 G/30ML
15 SOLUTION ORAL 2 TIMES DAILY
Status: DISCONTINUED | OUTPATIENT
Start: 2021-01-10 | End: 2021-01-10

## 2021-01-10 RX ORDER — POLYETHYLENE GLYCOL 3350 17 G/17G
1 POWDER, FOR SOLUTION ORAL
Status: DISCONTINUED | OUTPATIENT
Start: 2021-01-10 | End: 2021-01-14 | Stop reason: HOSPADM

## 2021-01-10 RX ORDER — TRAMADOL HYDROCHLORIDE 50 MG/1
50 TABLET ORAL EVERY 8 HOURS PRN
Status: DISCONTINUED | OUTPATIENT
Start: 2021-01-10 | End: 2021-01-12

## 2021-01-10 RX ADMIN — VANCOMYCIN HYDROCHLORIDE 750 MG: 500 INJECTION, POWDER, LYOPHILIZED, FOR SOLUTION INTRAVENOUS at 15:00

## 2021-01-10 RX ADMIN — SENNOSIDES 8.6 MG: 8.6 TABLET, FILM COATED ORAL at 06:24

## 2021-01-10 RX ADMIN — ALPRAZOLAM 0.5 MG: 0.5 TABLET ORAL at 22:37

## 2021-01-10 RX ADMIN — DOCUSATE SODIUM 100 MG: 100 CAPSULE, LIQUID FILLED ORAL at 17:31

## 2021-01-10 RX ADMIN — LACTULOSE 15 ML: 20 SOLUTION ORAL at 08:34

## 2021-01-10 RX ADMIN — TRAMADOL HYDROCHLORIDE 50 MG: 50 TABLET, COATED ORAL at 21:06

## 2021-01-10 RX ADMIN — ALPRAZOLAM 0.5 MG: 0.5 TABLET ORAL at 19:28

## 2021-01-10 RX ADMIN — ENOXAPARIN SODIUM 40 MG: 40 INJECTION SUBCUTANEOUS at 06:24

## 2021-01-10 RX ADMIN — DOCUSATE SODIUM 100 MG: 100 CAPSULE, LIQUID FILLED ORAL at 06:25

## 2021-01-10 RX ADMIN — PIPERACILLIN AND TAZOBACTAM 3.38 G: 3; .375 INJECTION, POWDER, LYOPHILIZED, FOR SOLUTION INTRAVENOUS; PARENTERAL at 01:46

## 2021-01-10 RX ADMIN — ONDANSETRON 4 MG: 2 INJECTION INTRAMUSCULAR; INTRAVENOUS at 17:31

## 2021-01-10 RX ADMIN — ONDANSETRON 4 MG: 2 INJECTION INTRAMUSCULAR; INTRAVENOUS at 21:41

## 2021-01-10 RX ADMIN — PIPERACILLIN AND TAZOBACTAM 3.38 G: 3; .375 INJECTION, POWDER, LYOPHILIZED, FOR SOLUTION INTRAVENOUS; PARENTERAL at 10:23

## 2021-01-10 RX ADMIN — PIPERACILLIN AND TAZOBACTAM 3.38 G: 3; .375 INJECTION, POWDER, LYOPHILIZED, FOR SOLUTION INTRAVENOUS; PARENTERAL at 17:31

## 2021-01-10 RX ADMIN — ALPRAZOLAM 0.5 MG: 0.5 TABLET ORAL at 15:33

## 2021-01-10 RX ADMIN — ACETAMINOPHEN 650 MG: 325 TABLET, FILM COATED ORAL at 06:25

## 2021-01-10 ASSESSMENT — ENCOUNTER SYMPTOMS
DIARRHEA: 0
CONSTIPATION: 0
SPEECH CHANGE: 0
HALLUCINATIONS: 0
NECK PAIN: 0
SENSORY CHANGE: 0
SHORTNESS OF BREATH: 0
SINUS PAIN: 0
TREMORS: 0
DIZZINESS: 0
ABDOMINAL PAIN: 1
EYE PAIN: 0
CHILLS: 0
WEIGHT LOSS: 0
INSOMNIA: 0
DEPRESSION: 0
BLURRED VISION: 0
FEVER: 0
CLAUDICATION: 0
COUGH: 0
NAUSEA: 1
BACK PAIN: 0
SEIZURES: 0
DOUBLE VISION: 0
VOMITING: 0
HEMOPTYSIS: 0
ORTHOPNEA: 0
TINGLING: 0
MYALGIAS: 0
PHOTOPHOBIA: 0
HEADACHES: 0
SPUTUM PRODUCTION: 0
PALPITATIONS: 0

## 2021-01-10 ASSESSMENT — PAIN DESCRIPTION - PAIN TYPE: TYPE: CHRONIC PAIN

## 2021-01-10 NOTE — ASSESSMENT & PLAN NOTE
-Antiemetics PRN  -Will start bowel regimen, likely medication induced constipation as patient has been on opioids  -Monitor output    1/10: Patient had a BM this morning. We will continue docusate daily, and Miralax PRN daily.

## 2021-01-10 NOTE — PROGRESS NOTES
"Pharmacy Kinetics 60 y.o. female on vancomycin day # 1 2021    Currently on Vancomycin 1250mg iv Once  Provider specified end date: TBD    Indication for Treatment: surgical site infection    Pertinent history per medical record: Admitted on 2021 for with wound infection. Patient had bowel obstruction in 2020 that had to be partially resected. Patient was doing ok until about a week ago, she noticed that her wound was beginning to be foul smelling. She did note that there was weeping of the wound site since the surgery however it was initially clear and not malodorous. She was seen in surgical office post op and was told it was just weeping. She states that she grew concerned when she noticed pain around the site and the discharge becoming foul smelling..    Other antibiotics: zosyn    Allergies: Nsaids and Tape     List concerns for renal function: BUN/SCr ratio > 20:1, malnutrition/low albumin, nephrotoxic drugs(zosyn)    Pertinent cultures to date:   21: Blood & wound Cx pending    MRSA nares swab if pneumonia is a concern: n/a    Recent Labs     21  1820   WBC 13.4*   NEUTSPOLYS 77.00*     Recent Labs     21  1820   BUN 9   CREATININE 0.38*   ALBUMIN 2.9*     No results for input(s): VANCOTROUGH, VANCOPEAK, VANCORANDOM in the last 72 hours.No intake or output data in the 24 hours ending 21   /63   Pulse 95   Temp 37.1 °C (98.7 °F) (Temporal)   Resp 18   Ht 1.499 m (4' 11\")   Wt 46.8 kg (103 lb 3.2 oz)   SpO2 91%  Temp (24hrs), Av.1 °C (98.7 °F), Min:37.1 °C (98.7 °F), Max:37.1 °C (98.7 °F)      A/P   1. Vancomycin dose change: Initiate vancomycin 750mg (17mg/kg) q24h   2. Next vancomycin level:  @1230  3. Goal trough: 10 to 15 mcg/mL  4. Comments: pt very small and chronically ill, monitor UOP as CrCl may overestimate GFR.  Adjust dose / monitoring plan if indicated. De-escalate based on culture     Jalil Mayfield, PharmD, BCPS    "

## 2021-01-10 NOTE — ED PROVIDER NOTES
ED Provider Note    Scribed for Teodoro Rodriguez M.D. by Clarice Cramer. 1/9/2021, 5:30 PM.    Primary care provider: Linsey West D.O.  Means of arrival: EMS  History obtained from: patient  History limited by: none    CHIEF COMPLAINT  Chief Complaint   Patient presents with   • Post-Op Complications     transfer from [facility] for above       HPI  Lachelle Hilton is a 60 y.o. female who presents to the Emergency Department as a transfer from [place] for abdominal pain onset yesterday. Patient had a partial resection secondary to bowel obstruction on December 24th. She has had problems with her wound weeping clear fluid since the surgery. She reports that the wound is now secreting a malodorous, yellow fluid. She also reports associated nausea, vomiting, constipation, and panic attacks. Patient denies any fever, chills, dysuria, or diarrhea. She was given 3 g Unasyn en route.     REVIEW OF SYSTEMS  Review of Systems   Constitutional: Negative for chills and fever.   Gastrointestinal: Positive for constipation, nausea and vomiting. Negative for diarrhea.   Genitourinary: Negative for dysuria.   Skin:        Positive: abdominal wound oozing yellow, malodorous fluid.    Psychiatric/Behavioral: The patient is nervous/anxious.      All other systems negative.      PAST MEDICAL HISTORY   has a past medical history of Acid reflux, Anxiety, Arthritis, Bronchitis (2016), Cardiac arrest (HCC) (04/18/1999), Coccygeal pain, chronic (2012), Dental disorder, Heart burn, History of colonoscopy (07/15/2014), History of facial fracture, History of mammogram (6/17/16), History of Papanicolaou smear of cervix (6/24/16), History of pneumococcal infection (1999; 2011), History of pneumonia, Indigestion, Lumbar spine pain, Osteoporosis (2014), Osteoporosis, Pain (2020), Pneumonia, Psychiatric problem, Sepsis (HCC) (01/18/1999), and Weakness (2020).    SURGICAL HISTORY   has a past surgical history that includes gastric  "bypass laparoscopic (04/1999); other abdominal surgery; panniculectomy (2001); tracheostomy (1999); and bowel resection (12/24/2020).    SOCIAL HISTORY  Social History     Tobacco Use   • Smoking status: Never Smoker   • Smokeless tobacco: Never Used   Substance Use Topics   • Alcohol use: No   • Drug use: No       FAMILY HISTORY  Family History   Problem Relation Age of Onset   • Heart Disease Mother 67        CHF   • Cancer Father 58        brain and lung; former smoker       CURRENT MEDICATIONS  Home Medications     Reviewed by Liv Grande (Pharmacy Tech) on 01/09/21 at 1905  Med List Status: Complete   Medication Last Dose Status   albuterol (PROAIR HFA) 108 (90 Base) MCG/ACT Aero Soln inhalation aerosol 12/29/2020 Active   ALPRAZolam (XANAX) 0.5 MG Tab 1/9/2021 Active   B Complex Vitamins (B COMPLEX 100 PO) 1/9/2021 Active   benzonatate (TESSALON) 200 MG capsule Not Taking Active   betamethasone dipropionate (DIPROLENE) 0.05 % Ointment Not Taking Active   Calcium 500-100 MG-UNIT Chew Tab 1/9/2021 Active   Cayenne 450 MG Cap 1/9/2021 Active   Cholecalciferol (VITAMIN D) 2000 UNIT Tab 1/9/2021 Active   cyanocobalamin (VITAMIN B-12) 1000 MCG/ML Solution 12/12/2020 Active   fluticasone (FLOVENT HFA) 110 MCG/ACT Aerosol Not Taking Active   Magnesium 400 MG Tab 1/9/2021 Active   mometasone (NASONEX) 50 MCG/ACT nasal spray Not Taking Active   Multiple Vitamins-Minerals (EQL AIR PROTECTOR PO) 1/9/2021 Active   NON SPECIFIED 1/9/2021 Active   Omega-3 Fatty Acids (FISH OIL) 1200 MG Cap 1/9/2021 Active   omeprazole (PRILOSEC) 20 MG delayed-release capsule Not Taking Active   ondansetron (ZOFRAN) 4 MG Tab tablet >3 days ago Active   oxyCODONE immediate release (ROXICODONE) 10 MG immediate release tablet 1/9/2021 Active   SYRINGE-NEEDLE, DISP, 3 ML (BD INTEGRA SYRINGE) 25G X 1\" 3 ML Misc Not Taking Active   vitamin e (VITAMIN E) 400 UNIT Cap 1/9/2021 Active                ALLERGIES  Allergies   Allergen Reactions " "  • Nsaids      Other reaction(s): Adverse Drug Reaction  \"Bleeder\"   • Tape Rash     Tape causes blisters Wound barrier  OK       PHYSICAL EXAM  VITAL SIGNS: /68   Pulse 100   Temp 37.1 °C (98.7 °F) (Temporal)   Resp 20   Ht 1.499 m (4' 11\")   Wt 46.8 kg (103 lb 3.2 oz)   SpO2 99%   BMI 20.84 kg/m²     Constitutional:  Moderate distress  HENT: Moist mucous membranes  Eyes: No conjunctivitis or icterus  Neck: trachea is midline, no palpable thyroid  Lymphatic: No cervical lymphadenopathy  Cardiovascular: Regular rate and rhythm, no murmurs  Thorax & Lungs: Normal breath sounds, no rhonchi  Abdomen: Dehiscence in surgical wound with erythema, swelling, and purulent drainage, no guarding or rigidity.  Skin:. Warm and dry, no rash  Back: Non-tender, no CVA tenderness  Extremities:  no edema  Vascular: symmetric radial pulse  Neurologic: Normal gross motor    LABS  Labs Reviewed   CBC WITH DIFFERENTIAL - Abnormal; Notable for the following components:       Result Value    WBC 13.4 (*)     RBC 3.14 (*)     Hemoglobin 10.6 (*)     Hematocrit 32.1 (*)     .2 (*)     MCH 33.8 (*)     MCHC 33.0 (*)     RDW 52.4 (*)     Platelet Count 525 (*)     Neutrophils-Polys 77.00 (*)     Lymphocytes 7.50 (*)     Neutrophils (Absolute) 10.30 (*)     Monos (Absolute) 1.14 (*)     Eos (Absolute) 0.83 (*)     All other components within normal limits   COMP METABOLIC PANEL - Abnormal; Notable for the following components:    Sodium 127 (*)     Chloride 95 (*)     Anion Gap 6.0 (*)     Glucose 111 (*)     Creatinine 0.38 (*)     Calcium 7.6 (*)     AST(SGOT) 11 (*)     Albumin 2.9 (*)     Total Protein 5.2 (*)     All other components within normal limits   BLOOD CULTURE    Narrative:     Per Hospital Policy: Only change Specimen Src: to \"Line\" if  specified by physician order.   BLOOD CULTURE    Narrative:     Per Hospital Policy: Only change Specimen Src: to \"Line\" if  specified by physician order.   CULTURE WOUND W/ " GRAM STAIN   LACTIC ACID   COV-2 AND FLU A/B BY PCR ([facility])    Narrative:     Droplet, Special Contact, and Eye Protection  Have you been in close contact with a person who is suspected  or known to be positive for COVID-19 within the last 30 days  (e.g. last seen that person < 30 days ago)->No   COVID/SARS COV-2    Narrative:     Droplet, Special Contact, and Eye Protection  Have you been in close contact with a person who is suspected  or known to be positive for COVID-19 within the last 30 days  (e.g. last seen that person < 30 days ago)->No   ESTIMATED GFR   MAGNESIUM    Narrative:     Droplet, Special Contact, and Eye Protection   URINALYSIS   URINE CULTURE(NEW)     All labs reviewed by me.    RADIOLOGY  DX-CHEST-PORTABLE (1 VIEW)   Final Result      No acute cardiopulmonary findings.      FP-NEAEBAK-H/O    (Results Pending)       Ct Abdomen Pelvis W Iv Cont No Oral Cont    Result Date: 1/9/2021  1. Postsurgical changes in the left upper quadrant small bowel, as before. There are multiple dilated loops of small bowel, measuring up to 5.7 cm. This is improved from the prior study, when it measured 6.7 cm. There is small volume ascites with peritoneal enhancement, consistent with peritonitis. 2. Moderate to severe constipation. 3. Distended gallbladder and common bile duct measuring up to 9 mm. The common bile duct is tapered normally into the ampulla. These findings are similar to the prior study. An MRCP would be helpful. 4. Fluid containing umbilical hernia. Electronically Signed by: Jh Cartagena MD 1/9/2021 2:33 PM    The radiologist's interpretation of all radiological studies have been reviewed by me.    COURSE & MEDICAL DECISION MAKING  Pertinent Labs & Imaging studies reviewed. (See chart for details)    5:30 PM - Patient seen and examined at bedside. Patient will be treated with morphine 4 mg and zofran 4 mg. Ordered DX-chest, lactic acid, CBC with diff, CMP, UA, urine culture, blood culture, and wound  culture to evaluate her symptoms.     6:01 PM - Ordered for COVID testing.     7:18 PM - Paged hospitalist.    7:20 PM I discussed the patient's case and the above findings with Dr. Serrano ([facility]) who agreed to accept the patient.      Medical Decision Making:   The patient has a wound infection antibiotics were given at the prior facility.  CT was concerning for some peritonitis.  She was transferred here for admission.  I discussed case with Dr. Quinones whose going to see the patient in the morning of the also discussed the patient's case with Dr. Chiang for admission.    DISPOSITION:  Patient will be hospitalized by Dr. Serrano in guarded condition.     FINAL IMPRESSION  1. Wound infection after surgery    2. Hyponatremia          Clarice ELIZABETH (Scribe), am scribing for, and in the presence of, Teodoro Rodriguez M.D..    Electronically signed by: Clarice Cramer (Scribe), 1/9/2021    ITeodoro M.D. personally performed the services described in this documentation, as scribed by Clarice Cramer in my presence, and it is both accurate and complete. C    The note accurately reflects work and decisions made by me.  Teodoro Rodriguez M.D.  1/9/2021  8:37 PM

## 2021-01-10 NOTE — ASSESSMENT & PLAN NOTE
This is Sepsis Present on admission  SIRS criteria identified on admission include: Tachycardia, with heart rate greater than 90 BPM and Leukocytosis, with WBC greater than 12,000  Source is abdominal wound/peritonitis  Sepsis protocol initiated  Fluid resuscitation ordered per protocol  IV antibiotics as appropriate for source of sepsis  While organ dysfunction may be noted elsewhere in this problem list or in the chart, degree of organ dysfunction does not meet CMS criteria for severe sepsis  -Follow up culture postive for Staph Aureus sensitive to Unasyn which we will start now and switch to PO once tolerating PO.  -Monitor BP  -Wound care consultation  -Surgery consulted we appreciate further recommendations.

## 2021-01-10 NOTE — ED NOTES
Med rec complete via interview with pt at bedside.   Pt is taking xanax 0.5 mg 1 tab q4-5h and oxycodone 10 mg 1 tab q4-5h.  Allergies reviewed. Pt denies antibiotic use in past 14 days.

## 2021-01-10 NOTE — H&P
Hospital Medicine History & Physical Note    Date of Service  1/9/2021    Primary Care Physician  Linsey West D.O.    Consultants  Surgery    Code Status  Full Code    Chief Complaint  Chief Complaint   Patient presents with   • Post-Op Complications     transfer from [facility] for above       History of Presenting Illness  60 y.o. female who presented 1/9/2021 with wound infection. Patient had bowel obstruction in December of 2020 that had to be partially resected. Patient was doing ok until about a week ago, she noticed that her wound was beginning to be foul smelling. She did note that there was weeping of the wound site since the surgery however it was initially clear and not malodorous. She was seen in surgical office post op and was told it was just weeping. She states that she grew concerned when she noticed pain around the site and the discharge becoming foul smelling. She denies any fever or chills. She also reports 1 week of intermittent vomiting and nausea. She also states that she has not been able to defecate for the past 4 days which is not normal. She is taking opioids for pain control at this time. She has tried metamucil and miralax and dulcolax at home, with no relief.     She initially presented to [facility] and was transferred here. CT abdomen was done there which revealed peritonitis and moderate to severe constipation. CT also showed distended gallbladder with 9mm dilatation however labs does not show obstructive pattern.     Review of Systems  Review of Systems   Constitutional: Negative for chills and fever.   HENT: Negative.    Eyes: Negative.    Respiratory: Negative.    Cardiovascular: Negative.    Gastrointestinal: Positive for constipation, nausea and vomiting.   Genitourinary: Negative.    Musculoskeletal: Negative.    Skin:        Surgical site opening with purulent discharge, foul smelling   Neurological: Negative.    Psychiatric/Behavioral: The patient is  "nervous/anxious.        Past Medical History   has a past medical history of Acid reflux, Anxiety, Arthritis, Bronchitis (2016), Cardiac arrest (HCC) (04/18/1999), Coccygeal pain, chronic (2012), Dental disorder, Heart burn, History of colonoscopy (07/15/2014), History of facial fracture, History of mammogram (6/17/16), History of Papanicolaou smear of cervix (6/24/16), History of pneumococcal infection (1999; 2011), History of pneumonia, Indigestion, Lumbar spine pain, Osteoporosis (2014), Osteoporosis, Pain (2020), Pneumonia, Psychiatric problem, Sepsis (HCC) (01/18/1999), and Weakness (2020).    Surgical History   has a past surgical history that includes gastric bypass laparoscopic (04/1999); other abdominal surgery; panniculectomy (2001); tracheostomy (1999); and bowel resection (12/24/2020).     Family History  family history includes Cancer (age of onset: 58) in her father; Heart Disease (age of onset: 67) in her mother.     Social History   reports that she has never smoked. She has never used smokeless tobacco. She reports that she does not drink alcohol or use drugs.    Allergies  Allergies   Allergen Reactions   • Nsaids      Other reaction(s): Adverse Drug Reaction  \"Bleeder\"   • Tape Rash     Tape causes blisters Wound barrier  OK       Medications  Prior to Admission Medications   Prescriptions Last Dose Informant Patient Reported? Taking?   ALPRAZolam (XANAX) 0.5 MG Tab 1/9/2021 at 1100 Patient No No   Sig: take 1 to 2 tablets by mouth once daily if needed for anxiety   Patient taking differently: Take 0.5 mg by mouth every four hours as needed for Anxiety. take 1 to 2 tablets by mouth once daily if needed for anxiety   B Complex Vitamins (B COMPLEX 100 PO) 1/9/2021 at AM Patient Yes No   Sig: Take 400 mg by mouth every day.   Calcium 500-100 MG-UNIT Chew Tab 1/9/2021 at AM Patient Yes No   Sig: Chew 1 Cap every day.   Cayenne 450 MG Cap 1/9/2021 at AM Patient Yes Yes   Sig: Take 1 Cap by mouth every " "day.   Cholecalciferol (VITAMIN D) 2000 UNIT Tab 2021 at AM Patient Yes No   Sig: Take 6,000 Units by mouth every day.   Magnesium 400 MG Tab 2021 at AM Patient Yes No   Sig: Take 1 Tab by mouth every day.   Multiple Vitamins-Minerals (EQL AIR PROTECTOR PO) 2021 at AM Patient Yes No   Sig: Take 1 Tab by mouth every day.   NON SPECIFIED 2021 at AM Patient Yes No   Sig: Take 1 Tab by mouth every day. bariatric advantage with iron 45 mg    Omega-3 Fatty Acids (FISH OIL) 1200 MG Cap 2021 at AM Patient Yes No   Sig: Take 1 Cap by mouth every day.   SYRINGE-NEEDLE, DISP, 3 ML (BD INTEGRA SYRINGE) 25G X 1\" 3 ML Misc Not Taking at Unknown time Patient No No   Sig: use WITH B12   Patient not taking: Reported on 2021   albuterol (PROAIR HFA) 108 (90 Base) MCG/ACT Aero Soln inhalation aerosol 2020 at unknown Patient No No   Sig: Inhale 2 Puffs every 6 hours as needed for Shortness of Breath.   benzonatate (TESSALON) 200 MG capsule Not Taking at Unknown time Patient No No   Sig: Take 1 Cap by mouth 3 times a day as needed for Cough.   Patient not taking: Reported on 2021   betamethasone dipropionate (DIPROLENE) 0.05 % Ointment Not Taking at Unknown time Patient No No   Sig: Used to affected areas of the feet for 1-2 weeks   Patient not taking: Reported on 2021   cyanocobalamin (VITAMIN B-12) 1000 MCG/ML Solution 2020 at unknown Patient No No   Sig: inject 1 milliliter INTRAMUSCULAR EVERY 30 DAYS   fluticasone (FLOVENT HFA) 110 MCG/ACT Aerosol Not Taking at Unknown time Patient No No   Si-2 puffs by mouth up to twice daily for cough   Patient not taking: Reported on 2021   mometasone (NASONEX) 50 MCG/ACT nasal spray Not Taking at Unknown time Patient No No   Sig: Spray 2 Sprays in nose every day.   Patient not taking: Reported on 2021   omeprazole (PRILOSEC) 20 MG delayed-release capsule Not Taking at Unknown time Patient No No   Sig: TAKE 1 CAPSULE BY MOUTH  DAILY "   Patient not taking: Reported on 1/9/2021   ondansetron (ZOFRAN) 4 MG Tab tablet >3 days ago at unknown Patient No No   Sig: take 1 tablet by mouth every 4 hours if needed for nausea and vomiting   oxyCODONE immediate release (ROXICODONE) 10 MG immediate release tablet 1/9/2021 at 0530 Patient Yes No   Sig: Take 10 mg by mouth every four hours as needed for Severe Pain.   polyethylene glycol/lytes (MIRALAX) 17 g Pack 1/9/2021 at AM  Yes Yes   Sig: Take 17 g by mouth every day.   vitamin e (VITAMIN E) 400 UNIT Cap 1/9/2021 at AM Patient Yes No   Sig: Take 400 Units by mouth every day.      Facility-Administered Medications: None       Physical Exam  Temp:  [37.1 °C (98.7 °F)] 37.1 °C (98.7 °F)  Pulse:  [100-113] 104  Resp:  [19-20] 19  BP: (116-125)/(67-79) 121/78  SpO2:  [95 %-99 %] 96 %    Physical Exam  Vitals signs and nursing note reviewed.   Constitutional:       General: She is not in acute distress.     Appearance: Normal appearance. She is normal weight. She is not ill-appearing.   HENT:      Head: Normocephalic and atraumatic.      Mouth/Throat:      Mouth: Mucous membranes are moist.      Pharynx: Oropharynx is clear.   Eyes:      General: No scleral icterus.     Extraocular Movements: Extraocular movements intact.      Conjunctiva/sclera: Conjunctivae normal.      Pupils: Pupils are equal, round, and reactive to light.   Neck:      Musculoskeletal: Normal range of motion and neck supple. No neck rigidity or muscular tenderness.   Cardiovascular:      Rate and Rhythm: Regular rhythm. Tachycardia present.      Pulses: Normal pulses.      Heart sounds: Normal heart sounds. No murmur.   Pulmonary:      Effort: Pulmonary effort is normal.      Breath sounds: Normal breath sounds. No rhonchi.   Abdominal:      General: Abdomen is flat.      Palpations: Abdomen is soft.      Tenderness: There is abdominal tenderness.      Comments: Hypoactive bowel sounds, linear surgical site+ with ulcer above umbilicus, 1x1  with purulent discharge+, foul smelling, tender to touch around the area   Musculoskeletal: Normal range of motion.   Skin:     General: Skin is warm and dry.      Findings: Lesion present.   Neurological:      General: No focal deficit present.      Mental Status: She is alert and oriented to person, place, and time. Mental status is at baseline.   Psychiatric:      Comments: Anxious appearing         Laboratory:  Recent Labs     01/09/21  1820   WBC 13.4*   RBC 3.14*   HEMOGLOBIN 10.6*   HEMATOCRIT 32.1*   .2*   MCH 33.8*   MCHC 33.0*   RDW 52.4*   PLATELETCT 525*   MPV 9.9     Recent Labs     01/09/21  1820   SODIUM 127*   POTASSIUM 4.0   CHLORIDE 95*   CO2 26   GLUCOSE 111*   BUN 9   CREATININE 0.38*   CALCIUM 7.6*     Recent Labs     01/09/21  1820   ALTSGPT 12   ASTSGOT 11*   ALKPHOSPHAT 61   TBILIRUBIN 0.8   GLUCOSE 111*         No results for input(s): NTPROBNP in the last 72 hours.      No results for input(s): TROPONINT in the last 72 hours.    Imaging:  DX-CHEST-PORTABLE (1 VIEW)   Final Result      No acute cardiopulmonary findings.            Assessment/Plan:  I anticipate this patient will require at least two midnights for appropriate medical management, necessitating inpatient admission.    * Wound infection after surgery- (present on admission)  Assessment & Plan  -Plan under sepsis diagnosis    Gallbladder obstruction- (present on admission)  Assessment & Plan  -CT finding shows GB distention with 9mm CBD dilatation though labs do not support obstruction  -Will obtain MRCP as patient does complain of abdominal pain and n/v    CN (constipation)- (present on admission)  Assessment & Plan  -Antiemetics PRN  -Will start bowel regimen, likely medication induced constipation as patient has been on opioids  -Monitor output      Sepsis (HCC)- (present on admission)  Assessment & Plan  This is Sepsis Present on admission  SIRS criteria identified on my evaluation include: Tachycardia, with heart rate  greater than 90 BPM and Leukocytosis, with WBC greater than 12,000  Source is abdominal wound/peritonitis  Sepsis protocol initiated  Fluid resuscitation ordered per protocol  IV antibiotics as appropriate for source of sepsis  While organ dysfunction may be noted elsewhere in this problem list or in the chart, degree of organ dysfunction does not meet CMS criteria for severe sepsis  -Patient will receive Vanco and zosyn for coverage at this time due to recent manipulation to the area  -Monitor tachycardia  -Follow up culture and s/s and de-escalate antibiotics  -Monitor BP  -Wound care consultation  -Surgery consultation

## 2021-01-10 NOTE — ED NOTES
Blood rainbow and cultures x 2 drawn per orders and sent to lab. Wound culture collected from midline abdominal incision and sent to lab. COVID swab collected and sent to lab. Patient medicated per MAR for 9/10 abdominal pain. Informed of need for urine specimen per orders - states that she does not need to urinate at this time. Assisted into position of comfort on gurney. Denies further needs. Call bell within reach.

## 2021-01-10 NOTE — ASSESSMENT & PLAN NOTE
As per chart review patient takes Alprazolam PRN  We will continue it at this time, however patient would benefit from outpatient follow up to try to switch to SSRI if tolerated  We will defer to PCP

## 2021-01-10 NOTE — ED NOTES
Break RN: Patient's , Phil called for an update. RN provided a phone for patient to speak with her .

## 2021-01-10 NOTE — ED NOTES
Patient transported to floor via gurney in stable condition accompanied by transport. All belongings accounted for.

## 2021-01-10 NOTE — PROGRESS NOTES
"Pharmacy Kinetics 60 y.o. female on vancomycin day # 2 1/10/2021    Currently on Vancomycin 750 mg iv q24hr (1300)  Provider specified end date: TBD     Indication for Treatment: surgical site infection     Pertinent history per medical record: Admitted on 2021 for with wound infection. Patient had bowel obstruction in 2020 that had to be partially resected. Patient was doing ok until about a week ago, she noticed that her wound was beginning to be foul smelling. She did note that there was weeping of the wound site since the surgery however it was initially clear and not malodorous. She was seen in surgical office post op and was told it was just weeping. She states that she grew concerned when she noticed pain around the site and the discharge becoming foul smelling..     Other antibiotics: zosyn     Allergies: Nsaids and Tape      List concerns for renal function: BUN/SCr ratio > 20:1, malnutrition/low albumin, nephrotoxic drugs(zosyn)     Pertinent cultures to date:   21: Blood & wound Cx pending     MRSA nares swab if pneumonia is a concern: n/a    Recent Labs     21  1820 01/10/21  0004   WBC 13.4* 10.9*   NEUTSPOLYS 77.00* 74.10*     Recent Labs     21  1820 01/10/21  0004   BUN 9 7*   CREATININE 0.38* 0.36*   ALBUMIN 2.9*  --      No results for input(s): VANCOTROUGH, VANCOPEAK, VANCORANDOM in the last 72 hours.    Intake/Output Summary (Last 24 hours) at 1/10/2021 0905  Last data filed at 1/10/2021 0400  Gross per 24 hour   Intake 0 ml   Output 750 ml   Net -750 ml      /52   Pulse 90   Temp 36.4 °C (97.5 °F) (Temporal)   Resp 16   Ht 1.499 m (4' 11\")   Wt 48 kg (105 lb 13.1 oz)   SpO2 93%  Temp (24hrs), Av.7 °C (98 °F), Min:36.2 °C (97.1 °F), Max:37.7 °C (99.9 °F)      A/P   1. Vancomycin dose change: none  2. Next vancomycin level:  at 1230; prior to 3rd dose; ordered  3. Goal trough: 10-15 mcg/mL  4. Comments: Follow for surgery consult. MRCP planned " for noted gallbladder distention w/stones.  WBC improved, SCr stable. Trough to be assess prior to steady state tomorrow.    Benjamin Simmons, SriniD, BCPS

## 2021-01-10 NOTE — DISCHARGE PLANNING
Care Transition Team Assessment    SW completed chart review at admission and noted that this Pt is a recent re-admit.  Last admission assessment completed on 12/28/20.  Per chart review Pt lives with her spouse.  Pt uses [facility] in [place], [place] and reports that her PCP is Linsey West. Pt reported that she has had home health prior through [facility].     Information Source  Information Given By: Patient  Informant's Name: Lachelle  Who is responsible for making decisions for patient? : Patient    Elopement Risk  Legal Hold: No  Ambulatory or Self Mobile in Wheelchair: No-Not an Elopement Risk    Interdisciplinary Discharge Planning  Primary Care Physician: Linsey West  Lives with - Patient's Self Care Capacity: Spouse  Support Systems: Family Member(s), Friends / Neighbors, Spouse / Significant Other  Durable Medical Equipment: Other - Specify(W/C and Cane)    Discharge Preparedness  What is your plan after discharge?: Uncertain - pending medical team collaboration  What are your discharge supports?: Sibling, Spouse  Prior Functional Level: Independent with Activities of Daily Living    Functional Assesment  Prior Functional Level: Independent with Activities of Daily Living    Finances  Financial Barriers to Discharge: No  Prescription Coverage: Yes    Vision / Hearing Impairment  Right Eye Vision: Impaired, Wears Glasses  Left Eye Vision: Impaired, Wears Glasses    Advance Directive  Advance Directive?: None    Domestic Abuse  Have you ever been the victim of abuse or violence?: No    Psychological Assessment  History of Substance Abuse: None  History of Psychiatric Problems: No  Non-compliant with Treatment: No  Newly Diagnosed Illness: No    Discharge Risks or Barriers  Discharge risks or barriers?: No    Anticipated Discharge Information  Discharge Disposition: Still a Patient (30)         81.6

## 2021-01-10 NOTE — ASSESSMENT & PLAN NOTE
-Plan under sepsis diagnosis  -Growing Staph Aureus sensitive to Unasyn  -Switch to PO once tolerating PO diet.  -As per surgery continue tramadol for pain severe PRN

## 2021-01-10 NOTE — ASSESSMENT & PLAN NOTE
-CT finding shows GB distention with 9mm CBD dilatation though labs do not support obstruction    MRCP: 1. An 11 mm CBD with mild to moderate intrahepatic biliary dilatation. Pancreatic duct is also dilated to 4 mm. No choledocholithiasis. Etiology of this biliary and pancreatic ductal dilatation is uncertain. Consider ERCP/EUS for further evaluation; A 1.9 cm splenic lesion, statistically benign; Partially visualized pockets of loculated ascites within the abdomen; Prior gastric bypass. Moderate dilatation of the afferent limb with air-fluid levels, nonspecific.    Patient denies abdominal pain, liver function is normal, normal bilirubin.  Okay to follow-up as outpatient with GI if patient agrees with it    MRCP results reviewed by surgery, okay to follow-up as outpatient.

## 2021-01-10 NOTE — CARE PLAN
Problem: Communication  Goal: The ability to communicate needs accurately and effectively will improve  Outcome: PROGRESSING AS EXPECTED    Patient encouraged to use call light.   Goal: Will remain free from falls  Outcome: PROGRESSING AS EXPECTED     Patient encouraged to call for assistance for restroom.

## 2021-01-10 NOTE — ED TRIAGE NOTES
Lachelle Hilton  60 y.o.  Chief Complaint   Patient presents with   • Post-Op Complications     transfer from [facility] for above     BIB EMS for above. A & O x 4, GCS 15. Mask in place.    Patient s/p bowel resection secondary to bowel obstruction 12/24/2020. Presented today complaining of increased abdominal pain and drainage from incision site. LBM 1/5/2021. Endorses 9/10 mid abdominal pain despite taking Oxycodone at home as well as nausea and vomiting.    Upon transfer to this ED patient noted with midline abdominal incision steri strips. Open area to middle of incision noted with purulent drainage. Currently complaining of 9/10 mid abdominal pain.    PTA patient received IV Dilaudid 0.5 mg and IVPB Unasyn 3 gm.    ERP Dr. Rodriguez at bedside.

## 2021-01-10 NOTE — ASSESSMENT & PLAN NOTE
Hb on admission was 10.6, yesterday was 8.6. However patient received IV fluid resucitation.  No obvious signs of bleeding at this time, we have ordered iron panel, Vit B12, folate, occult blood stool  Hb has been on the lower side since surgery, so iron deficiency due to surgery could be the culprit  Ferrous gluconate started by surgery  Monitor and make changes accordingly

## 2021-01-10 NOTE — PROGRESS NOTES
Assumed patient care at 0700. Patient AxO x 4. Denies Pain. Patient had a very large bowel movement. Dressing change this morning per Dr. Freeman. Bed low and locked, alarm set. Hourly rounding continues.

## 2021-01-10 NOTE — PROGRESS NOTES
New admission to floor from ED report received from Sarah MERINO. Pt A&Ox4; anxious.  Pt c/o 10/10 pain in abdomen educated pt on opioids and current severe constipation however pt still requested RN to ector HAYES for additional pain meds, one time dose of IV morphine given.     Skin assessment: Abdominal wound noted to be dehiscence at umbilicus yellow purulent drainage with foul odor. Wet to dry dressing placed. Wound care consult already placed.      2330 vitals Bp 88/52. MD Serrano made aware. 1L bolus given Bp 0100: 93/48

## 2021-01-10 NOTE — PROGRESS NOTES
[facility] Medicine Daily Progress Note    Date of Service  1/10/2021    Chief Complaint  60 y.o. female admitted 1/9/2021 with post surgical wound infection.    Hospital Course  60 y.o. female who presented 1/9/2021 with wound infection. Patient had bowel obstruction in December of 2020 that had to be partially resected. Patient was doing ok until about a week ago, she noticed that her wound was beginning to be foul smelling. She did note that there was weeping of the wound site since the surgery however it was initially clear and not malodorous. She was seen in surgical office post op and was told it was just weeping. She states that she grew concerned when she noticed pain around the site and the discharge becoming foul smelling. She denies any fever or chills. She also reports 1 week of intermittent vomiting and nausea. She also states that she has not been able to defecate for the past 4 days which is not normal. She is taking opioids for pain control at this time. She has tried metamucil and miralax and dulcolax at home, with no relief.      She initially presented to [facility] and was transferred here. CT abdomen was done there which revealed peritonitis and moderate to severe constipation. CT also showed distended gallbladder with 9mm dilatation however labs does not show obstructive pattern.       Interval Problem Update  1/10: Patient seen at bedside, patient mentions she just had a BM and feels much better. She has an appetite now, we will reach out to surgery to see when we can start diet. She is currently not complaining of abdominal pain. We will continue IV antibiotics, pending wound culture. As per nursing no other over night events reported.    Consultants/Specialty  Surgery    Code Status  Full Code    Disposition  Continue IV antibiotics for now, pending final recommendations by surgery.    Review of Systems  Review of Systems   Constitutional: Positive for malaise/fatigue. Negative for  chills, fever and weight loss.   HENT: Negative for congestion, ear discharge, ear pain, hearing loss, nosebleeds, sinus pain and tinnitus.    Eyes: Negative for blurred vision, double vision, photophobia and pain.   Respiratory: Negative for cough, hemoptysis, sputum production and shortness of breath.    Cardiovascular: Negative for chest pain, palpitations, orthopnea and claudication.   Gastrointestinal: Positive for abdominal pain and nausea. Negative for constipation, diarrhea and vomiting.   Genitourinary: Negative for dysuria, frequency, hematuria and urgency.   Musculoskeletal: Negative for back pain, myalgias and neck pain.   Neurological: Negative for dizziness, tingling, tremors, sensory change, speech change, seizures and headaches.   Psychiatric/Behavioral: Negative for depression, hallucinations and suicidal ideas. The patient does not have insomnia.         Physical Exam  Temp:  [36.2 °C (97.1 °F)-37.7 °C (99.9 °F)] 36.4 °C (97.5 °F)  Pulse:  [] 90  Resp:  [16-20] 16  BP: ()/(48-79) 100/52  SpO2:  [91 %-99 %] 93 %    Physical Exam  Constitutional:       General: She is not in acute distress.     Appearance: She is not toxic-appearing.   HENT:      Head: Normocephalic and atraumatic.      Mouth/Throat:      Pharynx: No oropharyngeal exudate or posterior oropharyngeal erythema.   Eyes:      General:         Right eye: No discharge.         Left eye: No discharge.      Pupils: Pupils are equal, round, and reactive to light.   Neck:      Musculoskeletal: Normal range of motion and neck supple.   Cardiovascular:      Rate and Rhythm: Normal rate and regular rhythm.      Pulses: Normal pulses.      Heart sounds: Normal heart sounds. No murmur. No gallop.    Pulmonary:      Effort: Pulmonary effort is normal. No respiratory distress.      Breath sounds: No stridor. No wheezing or rales.   Abdominal:      General: Abdomen is flat. There is no distension.      Palpations: Abdomen is soft.       Tenderness: There is abdominal tenderness. There is no guarding.      Comments: lapartomy scar with wound packaged in place at the moment. No obvious signs of bleeding.   Musculoskeletal: Normal range of motion.      Right lower leg: No edema.      Left lower leg: No edema.   Skin:     General: Skin is warm and dry.   Neurological:      General: No focal deficit present.      Mental Status: She is alert and oriented to person, place, and time.      Cranial Nerves: No cranial nerve deficit.      Sensory: No sensory deficit.      Motor: No weakness.   Psychiatric:         Mood and Affect: Mood normal.         Behavior: Behavior normal.         Thought Content: Thought content normal.         Judgment: Judgment normal.         Fluids    Intake/Output Summary (Last 24 hours) at 1/10/2021 1223  Last data filed at 1/10/2021 0400  Gross per 24 hour   Intake 0 ml   Output 750 ml   Net -750 ml       Laboratory  Recent Labs     01/09/21  1820 01/10/21  0004   WBC 13.4* 10.9*   RBC 3.14* 2.55*   HEMOGLOBIN 10.6* 8.6*   HEMATOCRIT 32.1* 26.3*   .2* 103.1*   MCH 33.8* 33.7*   MCHC 33.0* 32.7*   RDW 52.4* 52.2*   PLATELETCT 525* 438   MPV 9.9 9.9     Recent Labs     01/09/21  1820 01/10/21  0004   SODIUM 127* 129*   POTASSIUM 4.0 3.7   CHLORIDE 95* 99   CO2 26 24   GLUCOSE 111* 98   BUN 9 7*   CREATININE 0.38* 0.36*   CALCIUM 7.6* 7.0*                   Imaging  DX-CHEST-PORTABLE (1 VIEW)   Final Result      No acute cardiopulmonary findings.      [id number]    (Results Pending)        Assessment/Plan  * Wound infection after surgery- (present on admission)  Assessment & Plan  -Plan under sepsis diagnosis    Sepsis (HCC)- (present on admission)  Assessment & Plan  This is Sepsis Present on admission  SIRS criteria identified on admission include: Tachycardia, with heart rate greater than 90 BPM and Leukocytosis, with WBC greater than 12,000  Source is abdominal wound/peritonitis  Sepsis protocol initiated  Fluid  resuscitation ordered per protocol  IV antibiotics as appropriate for source of sepsis  While organ dysfunction may be noted elsewhere in this problem list or in the chart, degree of organ dysfunction does not meet CMS criteria for severe sepsis  -Patient will receive Vanco and zosyn for coverage at this time due to recent manipulation to the area  -Monitor tachycardia  -Follow up culture and s/s and de-escalate antibiotics  -Monitor BP  -Wound care consultation  -Surgery consulted we appreciate further recommendations.      Hyponatremia  Assessment & Plan  Mild, asymptomatic  Patient currently on NS continous  Monitor, repeat BMP    Anemia  Assessment & Plan  Hb yesterday was 10.6, today is 8.6. However patient received IV fluid resucitation.  No obvious signs of bleeding at this time, we have ordered iron panel, Vit B12, folate, occult blood stool  Hb has been on the lower side since surgery, so iron deficiency due to surgery could be the culprit  We will await results prior to starting iron replacement therapy  Monitor and make changes accordingly    Gallbladder obstruction- (present on admission)  Assessment & Plan  -CT finding shows GB distention with 9mm CBD dilatation though labs do not support obstruction  -Will obtain MRCP as patient does complain of abdominal pain and n/v, which could be related to constipation as well.    CN (constipation)- (present on admission)  Assessment & Plan  -Antiemetics PRN  -Will start bowel regimen, likely medication induced constipation as patient has been on opioids  -Monitor output    1/10: Patient had a BM this morning. We will continue docusate daily, and Miralax PRN daily.      Anxiety  Assessment & Plan  As per chart review patient takes Alprazolam PRN  We will continue it at this time, however patient would benefit from outpatient follow up to try to switch to SSRI if tolerated  We will defer to PCP        VTE prophylaxis: lovenox

## 2021-01-10 NOTE — WOUND TEAM
Received wound consult for ABD.  Patient seen by Dr. Freeman and wet to dry orders placed.  Wound consult completed.  Please re-consult per surgery with any needs.

## 2021-01-10 NOTE — CONSULTS
Surgery General History & Physical Note    Date  1/10/2021    Primary Care Physician  Linsey West D.O.    CC  Abdominal incision drainage and constipation    HPI  This is a 60 y.o. female who is a patient of Dr. Barraza. She had an exploratory laparotomy, bowel resection and recreation of her Gurinder-en-Y anastomosis on 12/20/2020.  She did well and  was able to leave the hospital on 12/24.  She saw Dr. Benavides early last week and the staples were removed.  She had been having some slight drainage around her umbilicus but it was clear before that.  Afterwards this became malodorous and she became concerned because of the smell.  She denies having any fevers, chills, or other systemic symptoms.  She does endorse a few episodes of vomiting and a very low appetite ever since her recent surgery.  Her other chief complaint is severe constipation.    Past Medical History:   Diagnosis Date   • Acid reflux    • Anxiety    • Arthritis     osteo   • Bronchitis 2016   • Cardiac arrest ([facility]) 04/18/1999    sepsis   • Coccygeal pain, chronic 2012    an employee ran into her and she fell on [address]   • Dental disorder     implants   • Heart burn    • History of colonoscopy 07/15/2014    Normal and good for 10 years.  Done at [place].   • History of facial fracture    • History of mammogram 6/17/16    mammo   • History of Papanicolaou smear of cervix 6/24/16    normal at [place]   • History of pneumococcal infection 1999; 2011   • History of pneumonia     Never a smoker   • Indigestion    • Lumbar spine pain     Last epidural in early 2013, she's had trigger point injections that are somewhat helpful.   • Osteoporosis 2014    RECLAST received 9/'14, 9/'15 thru [place]; last dexa at [place] showed T-score -3.0 total L hip   • Osteoporosis     Reclast #3 received 1/11/17 at [facility]   • Pain 2020    everyplace   • Pneumonia    • Psychiatric problem     anxiety   • Sepsis ([facility]) 01/18/1999    cardiac arrest while septic   • Weakness 2020     generalized       Past Surgical History:   Procedure Laterality Date   • BOWEL RESECTION  12/24/2020    Procedure: EXCISION, INTESTINE- SMALL WITH CLOSURE OF GASTRIC FISTULA ...;  Surgeon: Royce Benavides M.D.;  Location: SURGERY [facility];  Service: General   • PANNICULECTOMY  2001   • GASTRIC BYPASS LAPAROSCOPIC  04/1999    sabine-en-y collapsed; complications over one year after. [facility]--Dr. Anne. She reports having sepsis afterwards.   • TRACHEOSTOMY  1999    closed 1999   • OTHER ABDOMINAL SURGERY      multiple hernia       Current Facility-Administered Medications   Medication Dose Route Frequency Provider Last Rate Last Admin   • lactulose 20 GM/30ML solution 15 mL  15 mL Oral BID Umer Lawrence M.D.       • albuterol inhaler 2 Puff  2 Puff Inhalation Q6HRS PRN Shaye Serrano M.D.       • ALPRAZolam (XANAX) tablet 0.5 mg  0.5 mg Oral Q4HRS PRN Shaye Serrano M.D.   0.5 mg at 01/09/21 2106   • NS infusion   Intravenous Continuous Shaye Serrano M.D. 83 mL/hr at 01/10/21 0041 Restarted at 01/10/21 0041   • enoxaparin (LOVENOX) inj 40 mg  40 mg Subcutaneous DAILY Shaye Serrano M.D.   40 mg at 01/10/21 0624   • acetaminophen (Tylenol) tablet 650 mg  650 mg Oral Q6HRS PRN Shaye Serrano M.D.   650 mg at 01/10/21 0625   • MD Alert...Vancomycin per Pharmacy   Other PHARMACY TO DOSE Shaye Serrano M.D.       • piperacillin-tazobactam (ZOSYN) 3.375 g in  mL IVPB  3.375 g Intravenous Q8HRS Shaye Serrano M.D.   Stopped at 01/10/21 0546   • vancomycin (VANCOCIN) 750 mg in  mL IVPB  17 mg/kg Intravenous Q24HR Shaye Serrano M.D.       • docusate sodium (COLACE) capsule 100 mg  100 mg Oral BID Shaye Serrano M.D.   100 mg at 01/10/21 0625   • sennosides (SENOKOT) 8.6 MG tablet 8.6 mg  8.6 mg Oral QDAY PRN MILES EdouardD.   8.6 mg at 01/10/21 0624   • ondansetron (ZOFRAN) syringe/vial injection 4 mg  4 mg Intravenous Q4HRS RAAD Serrano M.D.   4 mg at 01/09/21 2229       Social History     Socioeconomic  History   • Marital status:      Spouse name: Not on file   • Number of children: Not on file   • Years of education: Not on file   • Highest education level: Not on file   Occupational History   • Not on file   Social Needs   • Financial resource strain: Not on file   • Food insecurity     Worry: Never true     Inability: Never true   • Transportation needs     Medical: No     Non-medical: No   Tobacco Use   • Smoking status: Never Smoker   • Smokeless tobacco: Never Used   Substance and Sexual Activity   • Alcohol use: No   • Drug use: No   • Sexual activity: Yes     Partners: Male     Comment: ; moved from [facility] in July 2016. One child from a previous marriage   Lifestyle   • Physical activity     Days per week: Not on file     Minutes per session: Not on file   • Stress: Not on file   Relationships   • Social connections     Talks on phone: Not on file     Gets together: Not on file     Attends [employer]oration[employer] service: Not on file     Active member of club or organization: Not on file     Attends meetings of clubs or organizations: Not on file     Relationship status: Not on file   • Intimate partner violence     Fear of current or ex partner: Not on file     Emotionally abused: Not on file     Physically abused: Not on file     Forced sexual activity: Not on file   Other Topics Concern   • Not on file   Social History Narrative   • Not on file       Family History   Problem Relation Age of Onset   • Heart Disease Mother 67        CHF   • Cancer Father 58        brain and lung; former smoker       Allergies  Nsaids and Tape    Review of Systems  General: No weight changes or fatigue   HEENT: No changes in vision or trouble swallowing  CV: No chest pain or palpitations  Pulm: No shortness of breath or cough  GI: As above in HPI, no melena or hematochezia, no hematemesis  : No dysuria or hematuria  MSK: No joint or muscle pain  Skin: No new rashes or lesions  Lymph/Heme: No swelling or easy bruising,  no lymphadenopathy  Neuro: No headaches or seizures  Psych: No SI/HI    Physical Exam   Constitutional: She is oriented to person, place, and time. She appears well-developed and well-nourished. No distress.   HENT:   Head: Normocephalic and atraumatic.   Mouth/Throat: Oropharynx is clear and moist.   Eyes: Conjunctivae are normal. Right eye exhibits no discharge. Left eye exhibits no discharge.   Neck: Normal range of motion. No thyromegaly present.   Cardiovascular: Normal rate and regular rhythm.   Pulmonary/Chest: Effort normal.   Abdominal: Soft. She exhibits no distension. There is abdominal tenderness.   Mild appropriate incisional tenderness.  There is a defect in the skin just below the umbilicus and one at the very bottom portion of the incision.  This is draining muco-serous thick fluid.  There is minimal malodor.  There is no evidence of significant erythema or edema.  The remainder of the incision is well-healed.  On probing the inferior portion of it is open and the umbilical portion that is open are connected underneath the skin incision.   Musculoskeletal: Normal range of motion.   Lymphadenopathy:     She has no cervical adenopathy.   Neurological: She is alert and oriented to person, place, and time.   Skin: Skin is warm and dry. No rash noted. She is not diaphoretic. No erythema.   Psychiatric: She has a normal mood and affect. Her behavior is normal.       Vital Signs  Blood Pressure: 100/52   Temperature: 36.4 °C (97.5 °F)   Pulse: 90   Respiration: 16   Pulse Oximetry: 93 %       Labs:  Recent Labs     01/09/21  1820 01/10/21  0004   WBC 13.4* 10.9*   RBC 3.14* 2.55*   HEMOGLOBIN 10.6* 8.6*   HEMATOCRIT 32.1* 26.3*   .2* 103.1*   MCH 33.8* 33.7*   MCHC 33.0* 32.7*   RDW 52.4* 52.2*   PLATELETCT 525* 438   MPV 9.9 9.9     Recent Labs     01/09/21  1820 01/10/21  0004   SODIUM 127* 129*   POTASSIUM 4.0 3.7   CHLORIDE 95* 99   CO2 26 24   GLUCOSE 111* 98   BUN 9 7*   CREATININE 0.38* 0.36*    CALCIUM 7.6* 7.0*         Recent Labs     01/09/21  1820   ASTSGOT 11*   ALTSGPT 12   TBILIRUBIN 0.8   ALKPHOSPHAT 61   GLOBULIN 2.3       Radiology:  DX-CHEST-PORTABLE (1 VIEW)   Final Result      No acute cardiopulmonary findings.      [id number]    (Results Pending)     She reportedly had a CT scan at [facility] yesterday, which showed peritonitis and severe constipation.  There are no images available for me to review nor is there reviewed available for me to review.    Assessment/Plan:  1.  Draining midline incision, possible infection, no evidence of fascial dehiscence   I explained the patient that the 2 incisions do connect and so we will plan to pack this wet-to-dry with saline with Nu Gauze to facilitate drainage and decompression of any infection that is there.  It is a course of consideration that her old mesh may be infected, but would be very unlikely since it is already been there for 20 years and is well incorporated at this point.  I am really not sure what the peritonitis read on the outside CT is referring to, she certainly does not have clinical peritonitis.  I would recommend continuing the IV antibiotics and packing the wound.  Dr. Benavides will be back tomorrow.    2.  Severe constipation   I discussed a bowel regimen with the patient and she is agreeable to an enema.  As far as her pain goes she is having a lot of trouble with her back pain. she could use tramadol, which she is agreeable to trying as that has less constipation side effects.  Also encourage ambulation.

## 2021-01-11 ENCOUNTER — APPOINTMENT (OUTPATIENT)
Dept: RADIOLOGY | Facility: MEDICAL CENTER | Age: 61
DRG: 862 | End: 2021-01-11
Attending: INTERNAL MEDICINE
Payer: COMMERCIAL

## 2021-01-11 PROBLEM — E87.6 HYPOKALEMIA: Status: ACTIVE | Noted: 2021-01-11

## 2021-01-11 PROBLEM — R11.0 NAUSEA: Status: ACTIVE | Noted: 2021-01-11

## 2021-01-11 LAB
ALBUMIN SERPL BCP-MCNC: 2.5 G/DL (ref 3.2–4.9)
ALBUMIN/GLOB SERPL: 1 G/DL
ALP SERPL-CCNC: 63 U/L (ref 30–99)
ALT SERPL-CCNC: 9 U/L (ref 2–50)
ANION GAP SERPL CALC-SCNC: 9 MMOL/L (ref 7–16)
AST SERPL-CCNC: 9 U/L (ref 12–45)
BACTERIA WND AEROBE CULT: ABNORMAL
BACTERIA WND AEROBE CULT: ABNORMAL
BASOPHILS # BLD AUTO: 0.6 % (ref 0–1.8)
BASOPHILS # BLD: 0.06 K/UL (ref 0–0.12)
BILIRUB SERPL-MCNC: 0.5 MG/DL (ref 0.1–1.5)
BUN SERPL-MCNC: 5 MG/DL (ref 8–22)
CALCIUM SERPL-MCNC: 7.5 MG/DL (ref 8.5–10.5)
CHLORIDE SERPL-SCNC: 98 MMOL/L (ref 96–112)
CO2 SERPL-SCNC: 22 MMOL/L (ref 20–33)
CREAT SERPL-MCNC: 0.34 MG/DL (ref 0.5–1.4)
EOSINOPHIL # BLD AUTO: 0.57 K/UL (ref 0–0.51)
EOSINOPHIL NFR BLD: 5.8 % (ref 0–6.9)
ERYTHROCYTE [DISTWIDTH] IN BLOOD BY AUTOMATED COUNT: 51.8 FL (ref 35.9–50)
GLOBULIN SER CALC-MCNC: 2.4 G/DL (ref 1.9–3.5)
GLUCOSE SERPL-MCNC: 100 MG/DL (ref 65–99)
GRAM STN SPEC: ABNORMAL
HCT VFR BLD AUTO: 30.2 % (ref 37–47)
HGB BLD-MCNC: 9.9 G/DL (ref 12–16)
IMM GRANULOCYTES # BLD AUTO: 0.04 K/UL (ref 0–0.11)
IMM GRANULOCYTES NFR BLD AUTO: 0.4 % (ref 0–0.9)
LYMPHOCYTES # BLD AUTO: 0.99 K/UL (ref 1–4.8)
LYMPHOCYTES NFR BLD: 10 % (ref 22–41)
MCH RBC QN AUTO: 34.1 PG (ref 27–33)
MCHC RBC AUTO-ENTMCNC: 32.8 G/DL (ref 33.6–35)
MCV RBC AUTO: 104.1 FL (ref 81.4–97.8)
MONOCYTES # BLD AUTO: 0.78 K/UL (ref 0–0.85)
MONOCYTES NFR BLD AUTO: 7.9 % (ref 0–13.4)
NEUTROPHILS # BLD AUTO: 7.46 K/UL (ref 2–7.15)
NEUTROPHILS NFR BLD: 75.3 % (ref 44–72)
NRBC # BLD AUTO: 0 K/UL
NRBC BLD-RTO: 0 /100 WBC
PLATELET # BLD AUTO: 548 K/UL (ref 164–446)
PMV BLD AUTO: 10.4 FL (ref 9–12.9)
POTASSIUM SERPL-SCNC: 3.4 MMOL/L (ref 3.6–5.5)
PROT SERPL-MCNC: 4.9 G/DL (ref 6–8.2)
RBC # BLD AUTO: 2.9 M/UL (ref 4.2–5.4)
SIGNIFICANT IND 70042: ABNORMAL
SITE SITE: ABNORMAL
SODIUM SERPL-SCNC: 129 MMOL/L (ref 135–145)
SOURCE SOURCE: ABNORMAL
WBC # BLD AUTO: 9.9 K/UL (ref 4.8–10.8)

## 2021-01-11 PROCEDURE — A9270 NON-COVERED ITEM OR SERVICE: HCPCS | Performed by: INTERNAL MEDICINE

## 2021-01-11 PROCEDURE — 99232 SBSQ HOSP IP/OBS MODERATE 35: CPT | Performed by: INTERNAL MEDICINE

## 2021-01-11 PROCEDURE — 700102 HCHG RX REV CODE 250 W/ 637 OVERRIDE(OP): Performed by: PHYSICIAN ASSISTANT

## 2021-01-11 PROCEDURE — 770006 HCHG ROOM/CARE - MED/SURG/GYN SEMI*

## 2021-01-11 PROCEDURE — 700105 HCHG RX REV CODE 258: Performed by: INTERNAL MEDICINE

## 2021-01-11 PROCEDURE — 700111 HCHG RX REV CODE 636 W/ 250 OVERRIDE (IP): Performed by: INTERNAL MEDICINE

## 2021-01-11 PROCEDURE — 85025 COMPLETE CBC W/AUTO DIFF WBC: CPT

## 2021-01-11 PROCEDURE — 700102 HCHG RX REV CODE 250 W/ 637 OVERRIDE(OP): Performed by: INTERNAL MEDICINE

## 2021-01-11 PROCEDURE — 74181 MRI ABDOMEN W/O CONTRAST: CPT

## 2021-01-11 PROCEDURE — 36415 COLL VENOUS BLD VENIPUNCTURE: CPT

## 2021-01-11 PROCEDURE — 80053 COMPREHEN METABOLIC PANEL: CPT

## 2021-01-11 PROCEDURE — A9270 NON-COVERED ITEM OR SERVICE: HCPCS | Performed by: PHYSICIAN ASSISTANT

## 2021-01-11 RX ORDER — POTASSIUM CHLORIDE 20 MEQ/1
40 TABLET, EXTENDED RELEASE ORAL ONCE
Status: COMPLETED | OUTPATIENT
Start: 2021-01-11 | End: 2021-01-11

## 2021-01-11 RX ORDER — LISINOPRIL 5 MG/1
5 TABLET ORAL ONCE
Status: ACTIVE | OUTPATIENT
Start: 2021-01-11 | End: 2021-01-12

## 2021-01-11 RX ORDER — LABETALOL HYDROCHLORIDE 5 MG/ML
10 INJECTION, SOLUTION INTRAVENOUS EVERY 4 HOURS PRN
Status: DISCONTINUED | OUTPATIENT
Start: 2021-01-11 | End: 2021-01-14 | Stop reason: HOSPADM

## 2021-01-11 RX ORDER — POTASSIUM CHLORIDE 20 MEQ/1
40 TABLET, EXTENDED RELEASE ORAL DAILY
Status: DISCONTINUED | OUTPATIENT
Start: 2021-01-11 | End: 2021-01-11

## 2021-01-11 RX ORDER — FERROUS GLUCONATE 324(38)MG
324 TABLET ORAL
Status: DISCONTINUED | OUTPATIENT
Start: 2021-01-11 | End: 2021-01-14 | Stop reason: HOSPADM

## 2021-01-11 RX ADMIN — ENOXAPARIN SODIUM 40 MG: 40 INJECTION SUBCUTANEOUS at 04:03

## 2021-01-11 RX ADMIN — TRAMADOL HYDROCHLORIDE 50 MG: 50 TABLET, COATED ORAL at 17:51

## 2021-01-11 RX ADMIN — ONDANSETRON 4 MG: 2 INJECTION INTRAMUSCULAR; INTRAVENOUS at 17:51

## 2021-01-11 RX ADMIN — ALPRAZOLAM 0.5 MG: 0.5 TABLET ORAL at 21:51

## 2021-01-11 RX ADMIN — PIPERACILLIN AND TAZOBACTAM 3.38 G: 3; .375 INJECTION, POWDER, LYOPHILIZED, FOR SOLUTION INTRAVENOUS; PARENTERAL at 10:37

## 2021-01-11 RX ADMIN — ONDANSETRON 4 MG: 2 INJECTION INTRAMUSCULAR; INTRAVENOUS at 03:46

## 2021-01-11 RX ADMIN — POTASSIUM CHLORIDE 40 MEQ: 1500 TABLET, EXTENDED RELEASE ORAL at 09:05

## 2021-01-11 RX ADMIN — FERROUS GLUCONATE 324 MG: 324 TABLET ORAL at 17:51

## 2021-01-11 RX ADMIN — FERROUS GLUCONATE 324 MG: 324 TABLET ORAL at 09:05

## 2021-01-11 RX ADMIN — ALPRAZOLAM 0.5 MG: 0.5 TABLET ORAL at 17:51

## 2021-01-11 RX ADMIN — ALPRAZOLAM 0.5 MG: 0.5 TABLET ORAL at 03:51

## 2021-01-11 RX ADMIN — DOCUSATE SODIUM 100 MG: 100 CAPSULE, LIQUID FILLED ORAL at 04:04

## 2021-01-11 RX ADMIN — ONDANSETRON 4 MG: 2 INJECTION INTRAMUSCULAR; INTRAVENOUS at 08:59

## 2021-01-11 RX ADMIN — DOCUSATE SODIUM 100 MG: 100 CAPSULE, LIQUID FILLED ORAL at 17:54

## 2021-01-11 RX ADMIN — FERROUS GLUCONATE 324 MG: 324 TABLET ORAL at 13:23

## 2021-01-11 RX ADMIN — SENNOSIDES 8.6 MG: 8.6 TABLET, FILM COATED ORAL at 03:51

## 2021-01-11 RX ADMIN — PIPERACILLIN AND TAZOBACTAM 3.38 G: 3; .375 INJECTION, POWDER, LYOPHILIZED, FOR SOLUTION INTRAVENOUS; PARENTERAL at 02:23

## 2021-01-11 RX ADMIN — TRAMADOL HYDROCHLORIDE 50 MG: 50 TABLET, COATED ORAL at 09:23

## 2021-01-11 RX ADMIN — AMPICILLIN AND SULBACTAM 3 G: 1; 2 INJECTION, POWDER, FOR SOLUTION INTRAMUSCULAR; INTRAVENOUS at 17:56

## 2021-01-11 ASSESSMENT — ENCOUNTER SYMPTOMS
EYE PAIN: 0
HEMOPTYSIS: 0
FEVER: 0
BACK PAIN: 0
CHILLS: 0
SEIZURES: 0
HEADACHES: 0
SENSORY CHANGE: 0
SPUTUM PRODUCTION: 0
DOUBLE VISION: 0
CONSTIPATION: 1
NAUSEA: 1
ORTHOPNEA: 0
TINGLING: 0
ABDOMINAL PAIN: 1
SPEECH CHANGE: 0
PALPITATIONS: 0
CONSTIPATION: 0
PHOTOPHOBIA: 0
HALLUCINATIONS: 0
SHORTNESS OF BREATH: 0
COUGH: 0
DEPRESSION: 0
VOMITING: 0
CLAUDICATION: 0
NECK PAIN: 0
SINUS PAIN: 0
MYALGIAS: 0
DIZZINESS: 0
TREMORS: 0
WEIGHT LOSS: 0
INSOMNIA: 0
DIARRHEA: 0
BLURRED VISION: 0

## 2021-01-11 ASSESSMENT — PAIN DESCRIPTION - PAIN TYPE: TYPE: ACUTE PAIN

## 2021-01-11 NOTE — PROGRESS NOTES
With patient’s permission (if able), completed daily phone call to designated support person, abhijeet Villarreal  Discussed patient condition and plan of care. All questions answered.  Follow up requested: discharging and mri

## 2021-01-11 NOTE — ASSESSMENT & PLAN NOTE
Patient with nausea  We will advance diet as tolerated  We will continue with IV antibiotics for now and switch to PO once tolerating PO

## 2021-01-11 NOTE — PROGRESS NOTES
Surgical Progress Note    Author: Bebeto Lopez P.A.-C. Date & Time created: 2021   7:35 AM     Interval Events:    60 y.o. female who presented 2021 with concerns of wound infection. Patient had bowel obstruction in 2020 that had to be partially resected and required a few day hospital stay. Patient was seen in the office post-op and was seemingly progressing well. Patient was doing ok until about a week ago, she noticed that her wound was beginning to be foul smelling when she presented to [place] ER and CT scan showed evidence of peritonitis and moderate to severe constipation. CT also showed distended gallbladder with 9mm dilatation however labs does not show obstructive pattern. She was thus transferred here to [facility].     She denies any fever or chills, reports intermittent vomiting and nausea. She also states that she has not been able to defecate easily although she had a good BM yesterday morning after apparent enema. She is taking opioids for pain control at this time which im sure is contributing. She has tried metamucil and miralax and dulcolax at home, with minimal relief.    Patient appears to be doing well this AM . She is feeling better compared to yesterday. Per nursing staff, no issues overnight.     Review of Systems   Constitutional: Positive for malaise/fatigue. Negative for chills and fever.   HENT: Negative for congestion.    Eyes: Negative for blurred vision and double vision.   Respiratory: Negative for cough and shortness of breath.    Cardiovascular: Negative for chest pain.   Gastrointestinal: Positive for abdominal pain, constipation and nausea (after antibiotics). Negative for vomiting.   Skin: Negative for rash.   Neurological: Negative for dizziness.     Hemodynamics:  Temp (24hrs), Av.3 °C (97.3 °F), Min:36.2 °C (97.1 °F), Max:36.5 °C (97.7 °F)  Temperature: 36.5 °C (97.7 °F)  Pulse  Av.1  Min: 84  Max: 113   Blood Pressure: 153/86      Respiratory:    Respiration: 20, Pulse Oximetry: 94 %           Neuro:  GCS       Fluids:    Intake/Output Summary (Last 24 hours) at 1/11/2021 0735  Last data filed at 1/10/2021 1500  Gross per 24 hour   Intake 350 ml   Output --   Net 350 ml        Current Diet Order   Procedures   • Diet Order Diet: Full Liquid     Physical Exam  Constitutional:       Appearance: Normal appearance.      Comments: Fatigue   HENT:      Head: Normocephalic and atraumatic.      Mouth/Throat:      Mouth: Mucous membranes are moist.   Eyes:      Extraocular Movements: Extraocular movements intact.   Neck:      Musculoskeletal: Normal range of motion.   Cardiovascular:      Rate and Rhythm: Regular rhythm. Tachycardia present.   Pulmonary:      Effort: Pulmonary effort is normal. No respiratory distress.      Breath sounds: No wheezing.   Abdominal:      General: There is no distension.      Palpations: Abdomen is soft. There is no mass.      Tenderness: There is abdominal tenderness. There is no guarding or rebound.      Hernia: No hernia is present.      Comments: Midline wound with packing appreciate. No obvious erythema, ttp, malodor, purulent drainage. Iodoform in place.    Skin:     General: Skin is warm and dry.      Coloration: Skin is not jaundiced.   Neurological:      General: No focal deficit present.      Mental Status: She is alert.       Labs:  Recent Results (from the past 24 hour(s))   OCCULT BLOOD STOOL    Collection Time: 01/10/21  9:06 AM   Result Value Ref Range    Occult Blood Feces Negative Negative   IRON/TOTAL IRON BIND    Collection Time: 01/10/21 10:10 AM   Result Value Ref Range    Iron 10 (L) 40 - 170 ug/dL    Total Iron Binding 127 (L) 250 - 450 ug/dL    Unsat Iron Binding 117 110 - 370 ug/dL    % Saturation 8 (L) 15 - 55 %   FERRITIN    Collection Time: 01/10/21 10:10 AM   Result Value Ref Range    Ferritin 316.0 (H) 10.0 - 291.0 ng/mL   TRANSFERRIN    Collection Time: 01/10/21 10:10 AM   Result Value  Ref Range    Transferrin 101 (L) 200 - 370 mg/dL   VITAMIN B12    Collection Time: 01/10/21 10:10 AM   Result Value Ref Range    Vitamin B12 -True Cobalamin 2589 (H) 211 - 911 pg/mL   FOLATE    Collection Time: 01/10/21 10:10 AM   Result Value Ref Range    Folate -Folic Acid 18.9 >4.0 ng/mL   CBC WITH DIFFERENTIAL    Collection Time: 01/11/21  6:07 AM   Result Value Ref Range    WBC 9.9 4.8 - 10.8 K/uL    RBC 2.90 (L) 4.20 - 5.40 M/uL    Hemoglobin 9.9 (L) 12.0 - 16.0 g/dL    Hematocrit 30.2 (L) 37.0 - 47.0 %    .1 (H) 81.4 - 97.8 fL    MCH 34.1 (H) 27.0 - 33.0 pg    MCHC 32.8 (L) 33.6 - 35.0 g/dL    RDW 51.8 (H) 35.9 - 50.0 fL    Platelet Count 548 (H) 164 - 446 K/uL    MPV 10.4 9.0 - 12.9 fL    Neutrophils-Polys 75.30 (H) 44.00 - 72.00 %    Lymphocytes 10.00 (L) 22.00 - 41.00 %    Monocytes 7.90 0.00 - 13.40 %    Eosinophils 5.80 0.00 - 6.90 %    Basophils 0.60 0.00 - 1.80 %    Immature Granulocytes 0.40 0.00 - 0.90 %    Nucleated RBC 0.00 /100 WBC    Neutrophils (Absolute) 7.46 (H) 2.00 - 7.15 K/uL    Lymphs (Absolute) 0.99 (L) 1.00 - 4.80 K/uL    Monos (Absolute) 0.78 0.00 - 0.85 K/uL    Eos (Absolute) 0.57 (H) 0.00 - 0.51 K/uL    Baso (Absolute) 0.06 0.00 - 0.12 K/uL    Immature Granulocytes (abs) 0.04 0.00 - 0.11 K/uL    NRBC (Absolute) 0.00 K/uL   Comp Metabolic Panel    Collection Time: 01/11/21  6:07 AM   Result Value Ref Range    Sodium 129 (L) 135 - 145 mmol/L    Potassium 3.4 (L) 3.6 - 5.5 mmol/L    Chloride 98 96 - 112 mmol/L    Co2 22 20 - 33 mmol/L    Anion Gap 9.0 7.0 - 16.0    Glucose 100 (H) 65 - 99 mg/dL    Bun 5 (L) 8 - 22 mg/dL    Creatinine 0.34 (L) 0.50 - 1.40 mg/dL    Calcium 7.5 (L) 8.5 - 10.5 mg/dL    AST(SGOT) 9 (L) 12 - 45 U/L    ALT(SGPT) 9 2 - 50 U/L    Alkaline Phosphatase 63 30 - 99 U/L    Total Bilirubin 0.5 0.1 - 1.5 mg/dL    Albumin 2.5 (L) 3.2 - 4.9 g/dL    Total Protein 4.9 (L) 6.0 - 8.2 g/dL    Globulin 2.4 1.9 - 3.5 g/dL    A-G Ratio 1.0 g/dL   ESTIMATED GFR     Collection Time: 01/11/21  6:07 AM   Result Value Ref Range    GFR If African American >60 >60 mL/min/1.73 m 2    GFR If Non African American >60 >60 mL/min/1.73 m 2     Medical Decision Making, by Problem:  Active Hospital Problems    Diagnosis   • Sepsis (HCC) [A41.9]     Priority: High   • Anemia [D64.9]   • Hyponatremia [E87.1]   • Wound infection after surgery [T81.49XA]   • CN (constipation) [K59.00]   • Gallbladder obstruction [K82.0]   • Anxiety [F41.9]     Plan:    Abd wound infection: Wet to dry and iodoform packing. No evidence of fascial dehiscence. Continue current Zosyn and Vanco. Will transition to PO Augmentin and Doxy to cover for potential MRSA on discharge.     Constipation - Patient has had difficulty with BM in the past. Seems she is on quite a bit of pain medications. Will trial her on Tramadol as this has less constipation side effects. Continue Colace, Miralax PRN to avoid constipation    Anemia - It appears patient has mostly been anemic since surgery. Likely related in some degree to acute blood loss. Repeat Iron studies show iron deficiency anemia. Will start patient on ferrous gluoconate TID. See above    Distended Gallbladder - This may be a component of patient N/V. Confirmed on CT showing 9cc of dilation. Hospital team placed order for MRCP to evaluate for any disease process. We appreciate their assistance in this case. Pending these results, consideration for surgery.     HTN - She is historically is running hypotensive. Will give 1 time Lisinopril and continue to monitor.     Hyponatremia - Continue NS at 83 rate.   HypoK - Repleted with 40 mEq.         Quality Measures:  Quality-Core Measures    Discussed patient condition with Patient and Dr. Benavides

## 2021-01-11 NOTE — DISCHARGE PLANNING
Anticipated Discharge Disposition: Home, possible need for home health    Action: Per chart review patient was with [facility] prior to this admission. Dr. Leo notified of this during IDT rounds, TBD if she will need it upon DC. Pending abdominal MRI.     Barriers to Discharge: medical clearance.     Plan: follow and assist. Follow to see if patient will require home health to resume.

## 2021-01-11 NOTE — PROGRESS NOTES
[facility] Medicine Daily Progress Note    Date of Service  1/11/2021    Chief Complaint  60 y.o. female admitted 1/9/2021 with post surgical wound infection.    Hospital Course  60 y.o. female who presented 1/9/2021 with wound infection. Patient had bowel obstruction in December of 2020 that had to be partially resected. Patient was doing ok until about a week ago, she noticed that her wound was beginning to be foul smelling. She did note that there was weeping of the wound site since the surgery however it was initially clear and not malodorous. She was seen in surgical office post op and was told it was just weeping. She states that she grew concerned when she noticed pain around the site and the discharge becoming foul smelling. She denies any fever or chills. She also reports 1 week of intermittent vomiting and nausea. She also states that she has not been able to defecate for the past 4 days which is not normal. She is taking opioids for pain control at this time. She has tried metamucil and miralax and dulcolax at home, with no relief.      She initially presented to [facility] and was transferred here. CT abdomen was done there which revealed peritonitis and moderate to severe constipation. CT also showed distended gallbladder with 9mm dilatation however labs does not show obstructive pattern.       Interval Problem Update  1/10: Patient seen at bedside, patient mentions she just had a BM and feels much better. She has an appetite now, we will reach out to surgery to see when we can start diet. She is currently not complaining of abdominal pain. We will continue IV antibiotics, pending wound culture. As per nursing no other over night events reported.    1/11: Patient complaining of nausea, mentions she is passing gas. We will continue IV antiobiotics until toelrating PO. We will advance diet as tolerated. Pending abdominal MRI. As per nursing no other over night events  reported.    Consultants/Specialty  Surgery    Code Status  Full Code    Disposition  Continue IV antibiotics for now, pending final recommendations by surgery.    Review of Systems  Review of Systems   Constitutional: Positive for malaise/fatigue. Negative for chills, fever and weight loss.   HENT: Negative for congestion, ear discharge, ear pain, hearing loss, nosebleeds, sinus pain and tinnitus.    Eyes: Negative for blurred vision, double vision, photophobia and pain.   Respiratory: Negative for cough, hemoptysis, sputum production and shortness of breath.    Cardiovascular: Negative for chest pain, palpitations, orthopnea and claudication.   Gastrointestinal: Positive for abdominal pain and nausea. Negative for constipation, diarrhea and vomiting.   Genitourinary: Negative for dysuria, frequency, hematuria and urgency.   Musculoskeletal: Negative for back pain, myalgias and neck pain.   Neurological: Negative for dizziness, tingling, tremors, sensory change, speech change, seizures and headaches.   Psychiatric/Behavioral: Negative for depression, hallucinations and suicidal ideas. The patient does not have insomnia.         Physical Exam  Temp:  [36.2 °C (97.1 °F)-36.8 °C (98.3 °F)] 36.8 °C (98.3 °F)  Pulse:  [] 100  Resp:  [16-20] 16  BP: (121-153)/(66-86) 133/73  SpO2:  [94 %-100 %] 94 %    Physical Exam  Constitutional:       General: She is not in acute distress.     Appearance: She is not toxic-appearing.   HENT:      Head: Normocephalic and atraumatic.      Mouth/Throat:      Pharynx: No oropharyngeal exudate or posterior oropharyngeal erythema.   Eyes:      General:         Right eye: No discharge.         Left eye: No discharge.      Pupils: Pupils are equal, round, and reactive to light.   Neck:      Musculoskeletal: Normal range of motion and neck supple.   Cardiovascular:      Rate and Rhythm: Normal rate and regular rhythm.      Pulses: Normal pulses.      Heart sounds: Normal heart sounds. No  murmur. No gallop.    Pulmonary:      Effort: Pulmonary effort is normal. No respiratory distress.      Breath sounds: No stridor. No wheezing or rales.   Abdominal:      General: Abdomen is flat. There is no distension.      Palpations: Abdomen is soft.      Tenderness: There is abdominal tenderness. There is no guarding.      Comments: lapartomy scar with wound packaged in place at the moment. No obvious signs of bleeding.   Musculoskeletal: Normal range of motion.      Right lower leg: No edema.      Left lower leg: No edema.   Skin:     General: Skin is warm and dry.   Neurological:      General: No focal deficit present.      Mental Status: She is alert and oriented to person, place, and time.      Cranial Nerves: No cranial nerve deficit.      Sensory: No sensory deficit.      Motor: No weakness.   Psychiatric:         Mood and Affect: Mood normal.         Behavior: Behavior normal.         Thought Content: Thought content normal.         Judgment: Judgment normal.         Fluids    Intake/Output Summary (Last 24 hours) at 1/11/2021 1513  Last data filed at 1/11/2021 1000  Gross per 24 hour   Intake 2828.92 ml   Output --   Net 2828.92 ml       Laboratory  Recent Labs     01/09/21  1820 01/10/21  0004 01/11/21  0607   WBC 13.4* 10.9* 9.9   RBC 3.14* 2.55* 2.90*   HEMOGLOBIN 10.6* 8.6* 9.9*   HEMATOCRIT 32.1* 26.3* 30.2*   .2* 103.1* 104.1*   MCH 33.8* 33.7* 34.1*   MCHC 33.0* 32.7* 32.8*   RDW 52.4* 52.2* 51.8*   PLATELETCT 525* 438 548*   MPV 9.9 9.9 10.4     Recent Labs     01/09/21  1820 01/10/21  0004 01/11/21  0607   SODIUM 127* 129* 129*   POTASSIUM 4.0 3.7 3.4*   CHLORIDE 95* 99 98   CO2 26 24 22   GLUCOSE 111* 98 100*   BUN 9 7* 5*   CREATININE 0.38* 0.36* 0.34*   CALCIUM 7.6* 7.0* 7.5*                   Imaging  DX-CHEST-PORTABLE (1 VIEW)   Final Result      No acute cardiopulmonary findings.      [id number]    (Results Pending)        Assessment/Plan  * Wound infection after surgery-  (present on admission)  Assessment & Plan  -Plan under sepsis diagnosis  -Growing Staph Aureus sensitive to Unasyn  -Switch to PO once tolerating PO diet.    Nausea  Assessment & Plan  Patient with nausea  We will advance diet as tolerated  We will continue with IV antibiotics for now and switch to PO once tolerating PO    Sepsis (HCC)- (present on admission)  Assessment & Plan  This is Sepsis Present on admission  SIRS criteria identified on admission include: Tachycardia, with heart rate greater than 90 BPM and Leukocytosis, with WBC greater than 12,000  Source is abdominal wound/peritonitis  Sepsis protocol initiated  Fluid resuscitation ordered per protocol  IV antibiotics as appropriate for source of sepsis  While organ dysfunction may be noted elsewhere in this problem list or in the chart, degree of organ dysfunction does not meet CMS criteria for severe sepsis  -Follow up culture postive for Staph Aureus sensitive to Unasyn which we will start now and switch to PO once tolerating PO.  -Monitor BP  -Wound care consultation  -Surgery consulted we appreciate further recommendations.      Hypokalemia  Assessment & Plan  Replace as needed  Monitor, repeat BMP    Hyponatremia  Assessment & Plan  Mild, asymptomatic  Monitor, repeat BMP    Anemia  Assessment & Plan  Hb on admission was 10.6, yesterday was 8.6. However patient received IV fluid resucitation.  No obvious signs of bleeding at this time, we have ordered iron panel, Vit B12, folate, occult blood stool  Hb has been on the lower side since surgery, so iron deficiency due to surgery could be the culprit  Ferrous gluconate started by surgery  Monitor and make changes accordingly    Gallbladder obstruction- (present on admission)  Assessment & Plan  -CT finding shows GB distention with 9mm CBD dilatation though labs do not support obstruction  -Will obtain MRCP as patient does complain of abdominal pain and n/v, which could be related to constipation as  well.    1/11: Pending MRCP depending on results to be evaluated by surgery.    CN (constipation)- (present on admission)  Assessment & Plan  -Antiemetics PRN  -Will start bowel regimen, likely medication induced constipation as patient has been on opioids  -Monitor output    1/10: Patient had a BM this morning. We will continue docusate daily, and Miralax PRN daily.      Anxiety  Assessment & Plan  As per chart review patient takes Alprazolam PRN  We will continue it at this time, however patient would benefit from outpatient follow up to try to switch to SSRI if tolerated  We will defer to PCP        VTE prophylaxis: lovenox

## 2021-01-12 LAB
ALBUMIN SERPL BCP-MCNC: 2.9 G/DL (ref 3.2–4.9)
ALBUMIN/GLOB SERPL: 1.2 G/DL
ALP SERPL-CCNC: 62 U/L (ref 30–99)
ALT SERPL-CCNC: 10 U/L (ref 2–50)
ANION GAP SERPL CALC-SCNC: 9 MMOL/L (ref 7–16)
AST SERPL-CCNC: 9 U/L (ref 12–45)
BACTERIA UR CULT: NORMAL
BASOPHILS # BLD AUTO: 0.6 % (ref 0–1.8)
BASOPHILS # BLD: 0.04 K/UL (ref 0–0.12)
BILIRUB SERPL-MCNC: 0.3 MG/DL (ref 0.1–1.5)
BUN SERPL-MCNC: 3 MG/DL (ref 8–22)
CALCIUM SERPL-MCNC: 7.9 MG/DL (ref 8.5–10.5)
CHLORIDE SERPL-SCNC: 96 MMOL/L (ref 96–112)
CO2 SERPL-SCNC: 26 MMOL/L (ref 20–33)
CREAT SERPL-MCNC: 0.38 MG/DL (ref 0.5–1.4)
EOSINOPHIL # BLD AUTO: 0.97 K/UL (ref 0–0.51)
EOSINOPHIL NFR BLD: 14.7 % (ref 0–6.9)
ERYTHROCYTE [DISTWIDTH] IN BLOOD BY AUTOMATED COUNT: 53.3 FL (ref 35.9–50)
GLOBULIN SER CALC-MCNC: 2.4 G/DL (ref 1.9–3.5)
GLUCOSE SERPL-MCNC: 149 MG/DL (ref 65–99)
HCT VFR BLD AUTO: 31.1 % (ref 37–47)
HGB BLD-MCNC: 10 G/DL (ref 12–16)
IMM GRANULOCYTES # BLD AUTO: 0.02 K/UL (ref 0–0.11)
IMM GRANULOCYTES NFR BLD AUTO: 0.3 % (ref 0–0.9)
LYMPHOCYTES # BLD AUTO: 1.43 K/UL (ref 1–4.8)
LYMPHOCYTES NFR BLD: 21.7 % (ref 22–41)
MAGNESIUM SERPL-MCNC: 2 MG/DL (ref 1.5–2.5)
MCH RBC QN AUTO: 33.3 PG (ref 27–33)
MCHC RBC AUTO-ENTMCNC: 32.2 G/DL (ref 33.6–35)
MCV RBC AUTO: 103.7 FL (ref 81.4–97.8)
MONOCYTES # BLD AUTO: 0.62 K/UL (ref 0–0.85)
MONOCYTES NFR BLD AUTO: 9.4 % (ref 0–13.4)
NEUTROPHILS # BLD AUTO: 3.52 K/UL (ref 2–7.15)
NEUTROPHILS NFR BLD: 53.3 % (ref 44–72)
NRBC # BLD AUTO: 0 K/UL
NRBC BLD-RTO: 0 /100 WBC
PLATELET # BLD AUTO: 533 K/UL (ref 164–446)
PMV BLD AUTO: 10 FL (ref 9–12.9)
POTASSIUM SERPL-SCNC: 3.1 MMOL/L (ref 3.6–5.5)
PROT SERPL-MCNC: 5.3 G/DL (ref 6–8.2)
RBC # BLD AUTO: 3 M/UL (ref 4.2–5.4)
SIGNIFICANT IND 70042: NORMAL
SITE SITE: NORMAL
SODIUM SERPL-SCNC: 131 MMOL/L (ref 135–145)
SOURCE SOURCE: NORMAL
WBC # BLD AUTO: 6.6 K/UL (ref 4.8–10.8)

## 2021-01-12 PROCEDURE — 83735 ASSAY OF MAGNESIUM: CPT

## 2021-01-12 PROCEDURE — 36415 COLL VENOUS BLD VENIPUNCTURE: CPT

## 2021-01-12 PROCEDURE — 99232 SBSQ HOSP IP/OBS MODERATE 35: CPT | Performed by: STUDENT IN AN ORGANIZED HEALTH CARE EDUCATION/TRAINING PROGRAM

## 2021-01-12 PROCEDURE — 700102 HCHG RX REV CODE 250 W/ 637 OVERRIDE(OP): Performed by: INTERNAL MEDICINE

## 2021-01-12 PROCEDURE — 85025 COMPLETE CBC W/AUTO DIFF WBC: CPT

## 2021-01-12 PROCEDURE — 700102 HCHG RX REV CODE 250 W/ 637 OVERRIDE(OP): Performed by: PHYSICIAN ASSISTANT

## 2021-01-12 PROCEDURE — 700111 HCHG RX REV CODE 636 W/ 250 OVERRIDE (IP): Performed by: INTERNAL MEDICINE

## 2021-01-12 PROCEDURE — 80053 COMPREHEN METABOLIC PANEL: CPT

## 2021-01-12 PROCEDURE — 700111 HCHG RX REV CODE 636 W/ 250 OVERRIDE (IP): Performed by: STUDENT IN AN ORGANIZED HEALTH CARE EDUCATION/TRAINING PROGRAM

## 2021-01-12 PROCEDURE — A9270 NON-COVERED ITEM OR SERVICE: HCPCS | Performed by: INTERNAL MEDICINE

## 2021-01-12 PROCEDURE — A9270 NON-COVERED ITEM OR SERVICE: HCPCS | Performed by: PHYSICIAN ASSISTANT

## 2021-01-12 PROCEDURE — A9270 NON-COVERED ITEM OR SERVICE: HCPCS | Performed by: STUDENT IN AN ORGANIZED HEALTH CARE EDUCATION/TRAINING PROGRAM

## 2021-01-12 PROCEDURE — 700105 HCHG RX REV CODE 258: Performed by: INTERNAL MEDICINE

## 2021-01-12 PROCEDURE — 700102 HCHG RX REV CODE 250 W/ 637 OVERRIDE(OP): Performed by: STUDENT IN AN ORGANIZED HEALTH CARE EDUCATION/TRAINING PROGRAM

## 2021-01-12 PROCEDURE — 770006 HCHG ROOM/CARE - MED/SURG/GYN SEMI*

## 2021-01-12 RX ORDER — OXYCODONE HYDROCHLORIDE 5 MG/1
5 TABLET ORAL EVERY 6 HOURS PRN
Status: DISCONTINUED | OUTPATIENT
Start: 2021-01-12 | End: 2021-01-14 | Stop reason: HOSPADM

## 2021-01-12 RX ORDER — POTASSIUM CHLORIDE 750 MG/1
40 TABLET, FILM COATED, EXTENDED RELEASE ORAL ONCE
Status: COMPLETED | OUTPATIENT
Start: 2021-01-12 | End: 2021-01-12

## 2021-01-12 RX ORDER — MORPHINE SULFATE 4 MG/ML
2 INJECTION, SOLUTION INTRAMUSCULAR; INTRAVENOUS ONCE
Status: COMPLETED | OUTPATIENT
Start: 2021-01-12 | End: 2021-01-12

## 2021-01-12 RX ORDER — TRAMADOL HYDROCHLORIDE 50 MG/1
50 TABLET ORAL EVERY 8 HOURS PRN
Status: DISCONTINUED | OUTPATIENT
Start: 2021-01-12 | End: 2021-01-14 | Stop reason: HOSPADM

## 2021-01-12 RX ADMIN — MORPHINE SULFATE 2 MG: 4 INJECTION INTRAVENOUS at 07:45

## 2021-01-12 RX ADMIN — OXYCODONE 5 MG: 5 TABLET ORAL at 17:56

## 2021-01-12 RX ADMIN — FERROUS GLUCONATE 324 MG: 324 TABLET ORAL at 12:32

## 2021-01-12 RX ADMIN — ALPRAZOLAM 0.5 MG: 0.5 TABLET ORAL at 16:44

## 2021-01-12 RX ADMIN — AMPICILLIN AND SULBACTAM 3 G: 1; 2 INJECTION, POWDER, FOR SOLUTION INTRAMUSCULAR; INTRAVENOUS at 06:48

## 2021-01-12 RX ADMIN — TRAMADOL HYDROCHLORIDE 50 MG: 50 TABLET, COATED ORAL at 16:34

## 2021-01-12 RX ADMIN — FERROUS GLUCONATE 324 MG: 324 TABLET ORAL at 16:45

## 2021-01-12 RX ADMIN — DOCUSATE SODIUM 100 MG: 100 CAPSULE, LIQUID FILLED ORAL at 04:04

## 2021-01-12 RX ADMIN — AMPICILLIN AND SULBACTAM 3 G: 1; 2 INJECTION, POWDER, FOR SOLUTION INTRAMUSCULAR; INTRAVENOUS at 12:32

## 2021-01-12 RX ADMIN — TRAMADOL HYDROCHLORIDE 50 MG: 50 TABLET, COATED ORAL at 04:04

## 2021-01-12 RX ADMIN — ALPRAZOLAM 0.5 MG: 0.5 TABLET ORAL at 21:12

## 2021-01-12 RX ADMIN — DOCUSATE SODIUM 100 MG: 100 CAPSULE, LIQUID FILLED ORAL at 16:44

## 2021-01-12 RX ADMIN — ONDANSETRON 4 MG: 2 INJECTION INTRAMUSCULAR; INTRAVENOUS at 06:48

## 2021-01-12 RX ADMIN — ONDANSETRON 4 MG: 2 INJECTION INTRAMUSCULAR; INTRAVENOUS at 16:37

## 2021-01-12 RX ADMIN — AMPICILLIN AND SULBACTAM 3 G: 1; 2 INJECTION, POWDER, FOR SOLUTION INTRAMUSCULAR; INTRAVENOUS at 18:01

## 2021-01-12 RX ADMIN — ENOXAPARIN SODIUM 40 MG: 40 INJECTION SUBCUTANEOUS at 04:04

## 2021-01-12 RX ADMIN — AMPICILLIN AND SULBACTAM 3 G: 1; 2 INJECTION, POWDER, FOR SOLUTION INTRAMUSCULAR; INTRAVENOUS at 23:35

## 2021-01-12 RX ADMIN — POTASSIUM CHLORIDE 40 MEQ: 750 TABLET, FILM COATED, EXTENDED RELEASE ORAL at 17:57

## 2021-01-12 RX ADMIN — AMPICILLIN AND SULBACTAM 3 G: 1; 2 INJECTION, POWDER, FOR SOLUTION INTRAMUSCULAR; INTRAVENOUS at 00:08

## 2021-01-12 RX ADMIN — FERROUS GLUCONATE 324 MG: 324 TABLET ORAL at 07:45

## 2021-01-12 ASSESSMENT — ENCOUNTER SYMPTOMS
MYALGIAS: 0
HEMOPTYSIS: 0
CLAUDICATION: 0
COUGH: 0
WEIGHT LOSS: 0
ABDOMINAL PAIN: 1
PALPITATIONS: 0
DIARRHEA: 0
NAUSEA: 1
SINUS PAIN: 0
EYE PAIN: 0
BLURRED VISION: 0
SENSORY CHANGE: 0
TREMORS: 0
ORTHOPNEA: 0
DIZZINESS: 0
DOUBLE VISION: 0
SPUTUM PRODUCTION: 0
CONSTIPATION: 0
CHILLS: 0
SEIZURES: 0
HALLUCINATIONS: 0
DEPRESSION: 0
BACK PAIN: 0
SHORTNESS OF BREATH: 0
VOMITING: 0
FEVER: 0
HEADACHES: 0
TINGLING: 0
SPEECH CHANGE: 0
NECK PAIN: 0
INSOMNIA: 0
PHOTOPHOBIA: 0

## 2021-01-12 ASSESSMENT — PAIN DESCRIPTION - PAIN TYPE
TYPE: ACUTE PAIN
TYPE: ACUTE PAIN
TYPE: ACUTE PAIN;SURGICAL PAIN

## 2021-01-12 NOTE — PROGRESS NOTES
Surgical Progress Note    Author: ROCHELLE Ashby Date & Time created: 2021   10:47 AM     Interval Events:  60 y.o. female who presented 2021 with concerns of midline wound infection. Patient had small bowel obstruction in 2020 that had to be partially resected and required a few day hospital stay. Patient was seen in the office post-op and was seemingly progressing well. Over the last week, he noticed that her wound was beginning to be malodorous when she presented to [place] ER and CT scan showed evidence of peritonitis and moderate to severe constipation. CT also showed distended gallbladder with 9mm dilatation. Labs does not show obstructive pattern. She was thus transferred here to [facility].      She denies any fever or chills, reports intermittent vomiting and nausea. Positive BM this AM.  Nausea appears to be approving.  Educated that she should take stool softeners daily if she is going to continue to take pain medication.     She verbalizes that she is feeling better this AM.  Interested in going home.       ROS  Hemodynamics:  Temp (24hrs), Av.6 °C (97.9 °F), Min:36.1 °C (97 °F), Max:37.2 °C (98.9 °F)  Temperature: 37.2 °C (98.9 °F)  Pulse  Av.7  Min: 84  Max: 113   Blood Pressure: 113/77     Respiratory:    Respiration: 17, Pulse Oximetry: 94 %           Neuro:  GCS       Fluids:    Intake/Output Summary (Last 24 hours) at 2021 1047  Last data filed at 2021  Gross per 24 hour   Intake 120 ml   Output --   Net 120 ml        Current Diet Order   Procedures   • Diet Order Diet: Full Liquid     Physical Exam  Constitutional:       General: She is not in acute distress.     Appearance: She is obese.   HENT:      Head: Normocephalic and atraumatic.      Mouth/Throat:      Mouth: Mucous membranes are moist.   Eyes:      Conjunctiva/sclera: Conjunctivae normal.   Neck:      Musculoskeletal: Normal range of motion and neck supple.   Cardiovascular:      Rate and  Rhythm: Normal rate and regular rhythm.   Pulmonary:      Effort: Pulmonary effort is normal.   Abdominal:      General: There is no distension.      Palpations: There is no mass.      Tenderness: There is abdominal tenderness ( incisional). There is no guarding or rebound.   Skin:     General: Skin is warm and dry.      Comments: Midline incision with 2 1cm with iodoform packing. No erythema or drainage.   Neurological:      Mental Status: She is alert and oriented to person, place, and time.   Psychiatric:         Mood and Affect: Mood normal.         Behavior: Behavior normal.         Judgment: Judgment normal.       Labs:  No results found for this or any previous visit (from the past 24 hour(s)).  Medical Decision Making, by Problem:  Active Hospital Problems    Diagnosis   • Nausea [R11.0]     Priority: High   • Sepsis (HCC) [A41.9]     Priority: High   • Hypokalemia [E87.6]   • Anemia [D64.9]   • Hyponatremia [E87.1]   • Wound infection after surgery [T81.49XA]   • CN (constipation) [K59.00]   • Gallbladder obstruction [K82.0]   • Anxiety [F41.9]     Plan:    She is tolerating PO well today without nausea/vomiting.  Pain controlled with medication.  Pos BM this morning.  Ambulating halls well.      Ok to DC home per our perspective on oral cephalexin 500mg po QID for 10 days.     MRCP shows CBD dilation 11mm mild to moderate, pancreatic duct dilation of 4mm, no choledocholithiasis, normal gallbladder, 1.9 benign splenic lesion, ascites.  ERCP/EUS recommended.  We would be ok with this as an outpatient referral or inpatient whichever medicine would prefer and an outpatient consult to GI.  Appreciate recommendations.    She verbalizes that she would like wound care and home health after discharge.  Appreciate medicine or case management's help with this.      Quality Measures:  Quality-Core Measures   Reviewed items::  Medications reviewed  Rudd catheter::  No Rudd  DVT prophylaxis pharmacological::   Enoxaparin (Lovenox)      Discussed patient condition with RN and Patient Dr Benavides

## 2021-01-12 NOTE — CARE PLAN
Problem: Knowledge Deficit  Goal: Knowledge of the prescribed therapeutic regimen will improve  Outcome: PROGRESSING AS EXPECTED  Intervention: Discuss information regarding therpeutic regimen and document in education  Note: Discussed medications and tests with patient, patient verbalized understanding.      Problem: Psychosocial Needs:  Goal: Level of anxiety will decrease  Outcome: PROGRESSING SLOWER THAN EXPECTED  Intervention: Identify and develop with patient strategies to cope with anxiety triggers  Note: Patient requires frequent anxiety medication, patient is unable to cope without  medication.

## 2021-01-12 NOTE — PROGRESS NOTES
[facility] Medicine Daily Progress Note    Date of Service  1/12/2021    Chief Complaint  60 y.o. female admitted 1/9/2021 with post surgical wound infection.    Hospital Course  60 y.o. female who presented 1/9/2021 with wound infection. Patient had bowel obstruction in December of 2020 that had to be partially resected. Patient was doing ok until about a week ago, she noticed that her wound was beginning to be foul smelling. She did note that there was weeping of the wound site since the surgery however it was initially clear and not malodorous. She was seen in surgical office post op and was told it was just weeping. She states that she grew concerned when she noticed pain around the site and the discharge becoming foul smelling. She denies any fever or chills. She also reports 1 week of intermittent vomiting and nausea. She also states that she has not been able to defecate for the past 4 days which is not normal. She is taking opioids for pain control at this time. She has tried metamucil and miralax and dulcolax at home, with no relief.      She initially presented to [facility] and was transferred here. CT abdomen was done there which revealed peritonitis and moderate to severe constipation. CT also showed distended gallbladder with 9mm dilatation however labs does not show obstructive pattern.       Interval Problem Update  I evaluated and examined the patient at the bedside.  Labs and imaging were reviewed. Care plan was discussed with the patient and bedside nurse.    Patient reported mild nausea, no vomiting.  Constipation resolved.  Patient had tolerated a diet well.      MRCP showed An 11 mm CBD with mild to moderate intrahepatic biliary dilatation. Pancreatic duct is also dilated to 4 mm. No choledocholithiasis.  Unclear etiology. Patient denies abdominal pain, liver function is normal, normal bilirubin.  Okay to follow-up as outpatient with GI if patient agrees with it    MRCP results  reviewed by surgery, okay to follow-up as outpatient.      Consultants/Specialty  Surgery    Code Status  Full Code    Disposition  Home health care with wound care    Review of Systems  Review of Systems   Constitutional: Positive for malaise/fatigue. Negative for chills, fever and weight loss.   HENT: Negative for congestion, ear discharge, ear pain, hearing loss, nosebleeds, sinus pain and tinnitus.    Eyes: Negative for blurred vision, double vision, photophobia and pain.   Respiratory: Negative for cough, hemoptysis, sputum production and shortness of breath.    Cardiovascular: Negative for chest pain, palpitations, orthopnea and claudication.   Gastrointestinal: Positive for abdominal pain and nausea. Negative for constipation, diarrhea and vomiting.   Genitourinary: Negative for dysuria, frequency, hematuria and urgency.   Musculoskeletal: Negative for back pain, myalgias and neck pain.   Neurological: Negative for dizziness, tingling, tremors, sensory change, speech change, seizures and headaches.   Psychiatric/Behavioral: Negative for depression, hallucinations and suicidal ideas. The patient does not have insomnia.         Physical Exam  Temp:  [36.1 °C (97 °F)-37.2 °C (98.9 °F)] 37.2 °C (98.9 °F)  Pulse:  [] 89  Resp:  [17] 17  BP: (106-113)/(67-77) 113/77  SpO2:  [94 %-96 %] 94 %    Physical Exam  Constitutional:       General: She is not in acute distress.     Appearance: She is not toxic-appearing.   HENT:      Head: Normocephalic and atraumatic.      Mouth/Throat:      Pharynx: No oropharyngeal exudate or posterior oropharyngeal erythema.   Eyes:      General:         Right eye: No discharge.         Left eye: No discharge.      Pupils: Pupils are equal, round, and reactive to light.   Neck:      Musculoskeletal: Normal range of motion and neck supple.   Cardiovascular:      Rate and Rhythm: Normal rate and regular rhythm.      Pulses: Normal pulses.      Heart sounds: Normal heart sounds. No  murmur. No gallop.    Pulmonary:      Effort: Pulmonary effort is normal. No respiratory distress.      Breath sounds: No stridor. No wheezing or rales.   Abdominal:      General: Abdomen is flat. There is no distension.      Palpations: Abdomen is soft.      Tenderness: There is abdominal tenderness. There is no guarding.      Comments: lapartomy scar with wound packaged in place at the moment. No obvious signs of bleeding.   Musculoskeletal: Normal range of motion.      Right lower leg: No edema.      Left lower leg: No edema.   Skin:     General: Skin is warm and dry.   Neurological:      General: No focal deficit present.      Mental Status: She is alert and oriented to person, place, and time.      Cranial Nerves: No cranial nerve deficit.      Sensory: No sensory deficit.      Motor: No weakness.   Psychiatric:         Mood and Affect: Mood normal.         Behavior: Behavior normal.         Thought Content: Thought content normal.         Judgment: Judgment normal.         Fluids    Intake/Output Summary (Last 24 hours) at 1/12/2021 1611  Last data filed at 1/12/2021 1500  Gross per 24 hour   Intake 360 ml   Output --   Net 360 ml       Laboratory  Recent Labs     01/10/21  0004 01/11/21  0607 01/12/21  1033   WBC 10.9* 9.9 6.6   RBC 2.55* 2.90* 3.00*   HEMOGLOBIN 8.6* 9.9* 10.0*   HEMATOCRIT 26.3* 30.2* 31.1*   .1* 104.1* 103.7*   MCH 33.7* 34.1* 33.3*   MCHC 32.7* 32.8* 32.2*   RDW 52.2* 51.8* 53.3*   PLATELETCT 438 548* 533*   MPV 9.9 10.4 10.0     Recent Labs     01/10/21  0004 01/11/21  0607 01/12/21  1033   SODIUM 129* 129* 131*   POTASSIUM 3.7 3.4* 3.1*   CHLORIDE 99 98 96   CO2 24 22 26   GLUCOSE 98 100* 149*   BUN 7* 5* 3*   CREATININE 0.36* 0.34* 0.38*   CALCIUM 7.0* 7.5* 7.9*                   Imaging  [id number]   Final Result      1. An 11 mm CBD with mild to moderate intrahepatic biliary dilatation. Pancreatic duct is also dilated to 4 mm. No choledocholithiasis. Etiology of this  biliary and pancreatic ductal dilatation is uncertain. Consider ERCP/EUS for further evaluation.   2. Normal gallbladder. No cholelithiasis.   3. A 1.9 cm splenic lesion, statistically benign.   4. Partially visualized pockets of loculated ascites within the abdomen.   5. Prior gastric bypass. Moderate dilatation of the afferent limb with air-fluid levels, nonspecific.         DX-CHEST-PORTABLE (1 VIEW)   Final Result      No acute cardiopulmonary findings.           Assessment/Plan  * Wound infection after surgery- (present on admission)  Assessment & Plan  -Plan under sepsis diagnosis  -Growing Staph Aureus sensitive to Unasyn  -Switch to PO once tolerating PO diet.  -As per surgery continue tramadol for pain severe PRN    Nausea  Assessment & Plan  Patient with nausea  We will advance diet as tolerated  We will continue with IV antibiotics for now and switch to PO once tolerating PO    Anemia  Assessment & Plan  Hb on admission was 10.6, yesterday was 8.6. However patient received IV fluid resucitation.  No obvious signs of bleeding at this time, we have ordered iron panel, Vit B12, folate, occult blood stool  Hb has been on the lower side since surgery, so iron deficiency due to surgery could be the culprit  Ferrous gluconate started by surgery  Monitor and make changes accordingly    Sepsis (HCC)- (present on admission)  Assessment & Plan  This is Sepsis Present on admission  SIRS criteria identified on admission include: Tachycardia, with heart rate greater than 90 BPM and Leukocytosis, with WBC greater than 12,000  Source is abdominal wound/peritonitis  Sepsis protocol initiated  Fluid resuscitation ordered per protocol  IV antibiotics as appropriate for source of sepsis  While organ dysfunction may be noted elsewhere in this problem list or in the chart, degree of organ dysfunction does not meet CMS criteria for severe sepsis  -Follow up culture postive for Staph Aureus sensitive to Unasyn which we will  start now and switch to PO once tolerating PO.  -Monitor BP  -Wound care consultation  -Surgery consulted we appreciate further recommendations.      Gallbladder obstruction- (present on admission)  Assessment & Plan  -CT finding shows GB distention with 9mm CBD dilatation though labs do not support obstruction    MRCP: 1. An 11 mm CBD with mild to moderate intrahepatic biliary dilatation. Pancreatic duct is also dilated to 4 mm. No choledocholithiasis. Etiology of this biliary and pancreatic ductal dilatation is uncertain. Consider ERCP/EUS for further evaluation; A 1.9 cm splenic lesion, statistically benign; Partially visualized pockets of loculated ascites within the abdomen; Prior gastric bypass. Moderate dilatation of the afferent limb with air-fluid levels, nonspecific.    Patient denies abdominal pain, liver function is normal, normal bilirubin.  Okay to follow-up as outpatient with GI if patient agrees with it    MRCP results reviewed by surgery, okay to follow-up as outpatient.          Hypokalemia  Assessment & Plan  Replace as needed  Monitor, repeat BMP    Hyponatremia  Assessment & Plan  Mild, asymptomatic  Monitor, repeat BMP    CN (constipation)- (present on admission)  Assessment & Plan  -Antiemetics PRN  -Will start bowel regimen, likely medication induced constipation as patient has been on opioids  -Monitor output    1/10: Patient had a BM this morning. We will continue docusate daily, and Miralax PRN daily.      Anxiety  Assessment & Plan  As per chart review patient takes Alprazolam PRN  We will continue it at this time, however patient would benefit from outpatient follow up to try to switch to SSRI if tolerated  We will defer to PCP        VTE prophylaxis: lovenox

## 2021-01-12 NOTE — FACE TO FACE
Face to Face Supporting Documentation - Home Health    The encounter with this patient was in whole or in part the primary reason for home health admission.    Date of encounter:   Patient:                    MRN:                       YOB: 2021  Lachelle Hilton  [id number]  1960     Home health to see patient for:  Wound Care    Skilled need for:  Surgical Aftercare Wound infection    Skilled nursing interventions to include:  Wound Care    Homebound status evidenced by:  Have a condition such that leaving his or her home is medically contraindicated. Leaving home requires a considerable and taxing effort. There is a normal inability to leave the home.    Community Physician to provide follow up care: Linsey West D.O.     Optional Interventions? No      I certify the face to face encounter for this home health care referral meets the CMS requirements and the encounter/clinical assessment with the patient was, in whole, or in part, for the medical condition(s) listed above, which is the primary reason for home health care. Based on my clinical findings: the service(s) are medically necessary, support the need for home health care, and the homebound criteria are met.  I certify that this patient has had a face to face encounter by myself.  Barbara Hawkins M.D. - NPI: [id number]

## 2021-01-12 NOTE — DISCHARGE PLANNING
Anticipated Discharge Disposition: Home, possible need for home health    Action: RN NORBERT received a call from patient's friend/caregiver, Mary ([phone]) who wanted to know about wound care and home health.     Per Mary, she had home health through [facility] in the past when she lived in [place] but as never had home health in [place].     Barriers to Discharge: medical clearance.     Plan: follow and assist. Follow to see if patient will require home health to resume. Discuss patient during IDT rounds.

## 2021-01-13 LAB
ALBUMIN SERPL BCP-MCNC: 2.5 G/DL (ref 3.2–4.9)
ALBUMIN/GLOB SERPL: 1.1 G/DL
ALP SERPL-CCNC: 54 U/L (ref 30–99)
ALT SERPL-CCNC: 6 U/L (ref 2–50)
ANION GAP SERPL CALC-SCNC: 3 MMOL/L (ref 7–16)
AST SERPL-CCNC: 12 U/L (ref 12–45)
BASOPHILS # BLD AUTO: 0.7 % (ref 0–1.8)
BASOPHILS # BLD: 0.05 K/UL (ref 0–0.12)
BILIRUB SERPL-MCNC: 0.2 MG/DL (ref 0.1–1.5)
BUN SERPL-MCNC: 3 MG/DL (ref 8–22)
CALCIUM SERPL-MCNC: 7.5 MG/DL (ref 8.5–10.5)
CHLORIDE SERPL-SCNC: 101 MMOL/L (ref 96–112)
CO2 SERPL-SCNC: 26 MMOL/L (ref 20–33)
CREAT SERPL-MCNC: 0.36 MG/DL (ref 0.5–1.4)
EOSINOPHIL # BLD AUTO: 1.15 K/UL (ref 0–0.51)
EOSINOPHIL NFR BLD: 16.6 % (ref 0–6.9)
ERYTHROCYTE [DISTWIDTH] IN BLOOD BY AUTOMATED COUNT: 52.7 FL (ref 35.9–50)
GLOBULIN SER CALC-MCNC: 2.2 G/DL (ref 1.9–3.5)
GLUCOSE SERPL-MCNC: 93 MG/DL (ref 65–99)
HCT VFR BLD AUTO: 29.4 % (ref 37–47)
HGB BLD-MCNC: 9.3 G/DL (ref 12–16)
IMM GRANULOCYTES # BLD AUTO: 0.02 K/UL (ref 0–0.11)
IMM GRANULOCYTES NFR BLD AUTO: 0.3 % (ref 0–0.9)
LYMPHOCYTES # BLD AUTO: 1.93 K/UL (ref 1–4.8)
LYMPHOCYTES NFR BLD: 27.8 % (ref 22–41)
MCH RBC QN AUTO: 32.6 PG (ref 27–33)
MCHC RBC AUTO-ENTMCNC: 31.6 G/DL (ref 33.6–35)
MCV RBC AUTO: 103.2 FL (ref 81.4–97.8)
MONOCYTES # BLD AUTO: 0.74 K/UL (ref 0–0.85)
MONOCYTES NFR BLD AUTO: 10.7 % (ref 0–13.4)
NEUTROPHILS # BLD AUTO: 3.05 K/UL (ref 2–7.15)
NEUTROPHILS NFR BLD: 43.9 % (ref 44–72)
NRBC # BLD AUTO: 0 K/UL
NRBC BLD-RTO: 0 /100 WBC
PLATELET # BLD AUTO: 494 K/UL (ref 164–446)
PMV BLD AUTO: 9.8 FL (ref 9–12.9)
POTASSIUM SERPL-SCNC: 4 MMOL/L (ref 3.6–5.5)
PROT SERPL-MCNC: 4.7 G/DL (ref 6–8.2)
RBC # BLD AUTO: 2.85 M/UL (ref 4.2–5.4)
SODIUM SERPL-SCNC: 130 MMOL/L (ref 135–145)
WBC # BLD AUTO: 6.9 K/UL (ref 4.8–10.8)

## 2021-01-13 PROCEDURE — 700111 HCHG RX REV CODE 636 W/ 250 OVERRIDE (IP): Performed by: INTERNAL MEDICINE

## 2021-01-13 PROCEDURE — A9270 NON-COVERED ITEM OR SERVICE: HCPCS | Performed by: PHYSICIAN ASSISTANT

## 2021-01-13 PROCEDURE — 80053 COMPREHEN METABOLIC PANEL: CPT

## 2021-01-13 PROCEDURE — A9270 NON-COVERED ITEM OR SERVICE: HCPCS | Performed by: INTERNAL MEDICINE

## 2021-01-13 PROCEDURE — 700101 HCHG RX REV CODE 250: Performed by: INTERNAL MEDICINE

## 2021-01-13 PROCEDURE — A9270 NON-COVERED ITEM OR SERVICE: HCPCS | Performed by: STUDENT IN AN ORGANIZED HEALTH CARE EDUCATION/TRAINING PROGRAM

## 2021-01-13 PROCEDURE — 770006 HCHG ROOM/CARE - MED/SURG/GYN SEMI*

## 2021-01-13 PROCEDURE — 85025 COMPLETE CBC W/AUTO DIFF WBC: CPT

## 2021-01-13 PROCEDURE — 700102 HCHG RX REV CODE 250 W/ 637 OVERRIDE(OP): Performed by: STUDENT IN AN ORGANIZED HEALTH CARE EDUCATION/TRAINING PROGRAM

## 2021-01-13 PROCEDURE — 700102 HCHG RX REV CODE 250 W/ 637 OVERRIDE(OP): Performed by: PHYSICIAN ASSISTANT

## 2021-01-13 PROCEDURE — 99232 SBSQ HOSP IP/OBS MODERATE 35: CPT | Performed by: STUDENT IN AN ORGANIZED HEALTH CARE EDUCATION/TRAINING PROGRAM

## 2021-01-13 PROCEDURE — 700102 HCHG RX REV CODE 250 W/ 637 OVERRIDE(OP): Performed by: INTERNAL MEDICINE

## 2021-01-13 PROCEDURE — 700105 HCHG RX REV CODE 258: Performed by: INTERNAL MEDICINE

## 2021-01-13 RX ORDER — CEPHALEXIN 500 MG/1
500 CAPSULE ORAL EVERY 6 HOURS
Status: DISCONTINUED | OUTPATIENT
Start: 2021-01-13 | End: 2021-01-14 | Stop reason: HOSPADM

## 2021-01-13 RX ADMIN — FERROUS GLUCONATE 324 MG: 324 TABLET ORAL at 17:50

## 2021-01-13 RX ADMIN — CEPHALEXIN 500 MG: 500 CAPSULE ORAL at 12:15

## 2021-01-13 RX ADMIN — POLYETHYLENE GLYCOL 3350 1 PACKET: 17 POWDER, FOR SOLUTION ORAL at 11:06

## 2021-01-13 RX ADMIN — OXYCODONE 5 MG: 5 TABLET ORAL at 00:00

## 2021-01-13 RX ADMIN — OXYCODONE 5 MG: 5 TABLET ORAL at 17:50

## 2021-01-13 RX ADMIN — OXYCODONE 5 MG: 5 TABLET ORAL at 06:00

## 2021-01-13 RX ADMIN — ALPRAZOLAM 0.5 MG: 0.5 TABLET ORAL at 19:56

## 2021-01-13 RX ADMIN — FERROUS GLUCONATE 324 MG: 324 TABLET ORAL at 07:42

## 2021-01-13 RX ADMIN — ALPRAZOLAM 0.5 MG: 0.5 TABLET ORAL at 13:43

## 2021-01-13 RX ADMIN — ENOXAPARIN SODIUM 40 MG: 40 INJECTION SUBCUTANEOUS at 06:00

## 2021-01-13 RX ADMIN — SENNOSIDES 8.6 MG: 8.6 TABLET, FILM COATED ORAL at 15:56

## 2021-01-13 RX ADMIN — CEPHALEXIN 500 MG: 500 CAPSULE ORAL at 23:24

## 2021-01-13 RX ADMIN — FERROUS GLUCONATE 324 MG: 324 TABLET ORAL at 12:09

## 2021-01-13 RX ADMIN — DOCUSATE SODIUM 100 MG: 100 CAPSULE, LIQUID FILLED ORAL at 17:49

## 2021-01-13 RX ADMIN — DOCUSATE SODIUM 100 MG: 100 CAPSULE, LIQUID FILLED ORAL at 06:00

## 2021-01-13 RX ADMIN — AMPICILLIN AND SULBACTAM 3 G: 1; 2 INJECTION, POWDER, FOR SOLUTION INTRAMUSCULAR; INTRAVENOUS at 06:00

## 2021-01-13 RX ADMIN — OXYCODONE 5 MG: 5 TABLET ORAL at 12:09

## 2021-01-13 RX ADMIN — CEPHALEXIN 500 MG: 500 CAPSULE ORAL at 17:49

## 2021-01-13 ASSESSMENT — ENCOUNTER SYMPTOMS
VOMITING: 0
CLAUDICATION: 0
INSOMNIA: 0
ABDOMINAL PAIN: 1
DIZZINESS: 0
PHOTOPHOBIA: 0
HEADACHES: 0
SPEECH CHANGE: 0
BLURRED VISION: 0
CONSTIPATION: 0
HALLUCINATIONS: 0
PALPITATIONS: 0
SENSORY CHANGE: 0
EYE PAIN: 0
CHILLS: 0
TINGLING: 0
MYALGIAS: 0
HEMOPTYSIS: 0
SHORTNESS OF BREATH: 0
TREMORS: 0
DIARRHEA: 0
DOUBLE VISION: 0
WEIGHT LOSS: 0
ORTHOPNEA: 0
BACK PAIN: 0
FEVER: 0
SINUS PAIN: 0
SEIZURES: 0
SPUTUM PRODUCTION: 0
DEPRESSION: 0
COUGH: 0
NAUSEA: 1
NECK PAIN: 0

## 2021-01-13 ASSESSMENT — PAIN DESCRIPTION - PAIN TYPE
TYPE: ACUTE PAIN
TYPE: ACUTE PAIN;SURGICAL PAIN

## 2021-01-13 NOTE — PROGRESS NOTES
With patient’s permission, completed daily phone call to designated support person Phil. Discussed patient condition and plan of care. All questions answered.

## 2021-01-13 NOTE — PROGRESS NOTES
With patient’s permission, completed daily phone call to designated support person, Phil Hilton (spouse). Discussed patient condition and plan of care. All questions answered.

## 2021-01-13 NOTE — PROGRESS NOTES
Surgical Progress Note    Author: Bebeto Lopez P.A.-C. Date & Time created: 2021   7:16 AM     Interval Events:    60 y.o. female who presented 2021 with concerns of midline wound infection. Patient had small bowel obstruction in 2020 that had to be partially resected and required a few day hospital stay. Patient was seen in the office post-op and was seemingly progressing well. Over the last week, he noticed that her wound was beginning to be malodorous when she presented to [place] [facility] and CT scan showed evidence of peritonitis and moderate to severe constipation. CT also showed distended gallbladder with 9mm dilatation. Labs does not show obstructive pattern. She was thus transferred here to [facility].      She denies any fever or chills, reports improved N/V. Multiple positive BM during stay.  Educated that she should take stool softeners daily if she is going to continue to take pain medication.     She verbalizes that she is feeling better this AM.  Interested in going home.  Pending  setup.     ROS  Hemodynamics:  Temp (24hrs), Av.8 °C (98.2 °F), Min:36.2 °C (97.1 °F), Max:37.2 °C (98.9 °F)  Temperature: 36.2 °C (97.1 °F)  Pulse  Av.2  Min: 84  Max: 113   Blood Pressure: 126/81     Respiratory:    Respiration: 17, Pulse Oximetry: 94 %           Neuro:  GCS       Fluids:    Intake/Output Summary (Last 24 hours) at 2021 0716  Last data filed at 2021  Gross per 24 hour   Intake 360 ml   Output --   Net 360 ml        Current Diet Order   Procedures   • Diet Order Diet: Regular     Physical Exam  Constitutional:       General: She is not in acute distress.     Appearance: She is obese.   HENT:      Head: Normocephalic and atraumatic.      Mouth/Throat:      Mouth: Mucous membranes are moist.   Eyes:      Conjunctiva/sclera: Conjunctivae normal.   Neck:      Musculoskeletal: Normal range of motion and neck supple.   Cardiovascular:      Rate and Rhythm: Normal rate and  regular rhythm.   Pulmonary:      Effort: Pulmonary effort is normal.   Abdominal:      General: There is no distension.      Palpations: There is no mass.      Tenderness: There is abdominal tenderness ( incisional). There is no guarding or rebound.   Skin:     General: Skin is warm and dry.      Comments: Midline incision with 2 1cm with iodoform packing. No erythema or drainage.   Neurological:      Mental Status: She is alert and oriented to person, place, and time.   Psychiatric:         Mood and Affect: Mood normal.         Behavior: Behavior normal.         Judgment: Judgment normal.       Labs:  Recent Results (from the past 24 hour(s))   Comp Metabolic Panel    Collection Time: 01/12/21 10:33 AM   Result Value Ref Range    Sodium 131 (L) 135 - 145 mmol/L    Potassium 3.1 (L) 3.6 - 5.5 mmol/L    Chloride 96 96 - 112 mmol/L    Co2 26 20 - 33 mmol/L    Anion Gap 9.0 7.0 - 16.0    Glucose 149 (H) 65 - 99 mg/dL    Bun 3 (L) 8 - 22 mg/dL    Creatinine 0.38 (L) 0.50 - 1.40 mg/dL    Calcium 7.9 (L) 8.5 - 10.5 mg/dL    AST(SGOT) 9 (L) 12 - 45 U/L    ALT(SGPT) 10 2 - 50 U/L    Alkaline Phosphatase 62 30 - 99 U/L    Total Bilirubin 0.3 0.1 - 1.5 mg/dL    Albumin 2.9 (L) 3.2 - 4.9 g/dL    Total Protein 5.3 (L) 6.0 - 8.2 g/dL    Globulin 2.4 1.9 - 3.5 g/dL    A-G Ratio 1.2 g/dL   CBC WITH DIFFERENTIAL    Collection Time: 01/12/21 10:33 AM   Result Value Ref Range    WBC 6.6 4.8 - 10.8 K/uL    RBC 3.00 (L) 4.20 - 5.40 M/uL    Hemoglobin 10.0 (L) 12.0 - 16.0 g/dL    Hematocrit 31.1 (L) 37.0 - 47.0 %    .7 (H) 81.4 - 97.8 fL    MCH 33.3 (H) 27.0 - 33.0 pg    MCHC 32.2 (L) 33.6 - 35.0 g/dL    RDW 53.3 (H) 35.9 - 50.0 fL    Platelet Count 533 (H) 164 - 446 K/uL    MPV 10.0 9.0 - 12.9 fL    Neutrophils-Polys 53.30 44.00 - 72.00 %    Lymphocytes 21.70 (L) 22.00 - 41.00 %    Monocytes 9.40 0.00 - 13.40 %    Eosinophils 14.70 (H) 0.00 - 6.90 %    Basophils 0.60 0.00 - 1.80 %    Immature Granulocytes 0.30 0.00 - 0.90 %     Nucleated RBC 0.00 /100 WBC    Neutrophils (Absolute) 3.52 2.00 - 7.15 K/uL    Lymphs (Absolute) 1.43 1.00 - 4.80 K/uL    Monos (Absolute) 0.62 0.00 - 0.85 K/uL    Eos (Absolute) 0.97 (H) 0.00 - 0.51 K/uL    Baso (Absolute) 0.04 0.00 - 0.12 K/uL    Immature Granulocytes (abs) 0.02 0.00 - 0.11 K/uL    NRBC (Absolute) 0.00 K/uL   MAGNESIUM    Collection Time: 01/12/21 10:33 AM   Result Value Ref Range    Magnesium 2.0 1.5 - 2.5 mg/dL   ESTIMATED GFR    Collection Time: 01/12/21 10:33 AM   Result Value Ref Range    GFR If African American >60 >60 mL/min/1.73 m 2    GFR If Non African American >60 >60 mL/min/1.73 m 2   CBC WITH DIFFERENTIAL    Collection Time: 01/13/21  2:16 AM   Result Value Ref Range    WBC 6.9 4.8 - 10.8 K/uL    RBC 2.85 (L) 4.20 - 5.40 M/uL    Hemoglobin 9.3 (L) 12.0 - 16.0 g/dL    Hematocrit 29.4 (L) 37.0 - 47.0 %    .2 (H) 81.4 - 97.8 fL    MCH 32.6 27.0 - 33.0 pg    MCHC 31.6 (L) 33.6 - 35.0 g/dL    RDW 52.7 (H) 35.9 - 50.0 fL    Platelet Count 494 (H) 164 - 446 K/uL    MPV 9.8 9.0 - 12.9 fL    Neutrophils-Polys 43.90 (L) 44.00 - 72.00 %    Lymphocytes 27.80 22.00 - 41.00 %    Monocytes 10.70 0.00 - 13.40 %    Eosinophils 16.60 (H) 0.00 - 6.90 %    Basophils 0.70 0.00 - 1.80 %    Immature Granulocytes 0.30 0.00 - 0.90 %    Nucleated RBC 0.00 /100 WBC    Neutrophils (Absolute) 3.05 2.00 - 7.15 K/uL    Lymphs (Absolute) 1.93 1.00 - 4.80 K/uL    Monos (Absolute) 0.74 0.00 - 0.85 K/uL    Eos (Absolute) 1.15 (H) 0.00 - 0.51 K/uL    Baso (Absolute) 0.05 0.00 - 0.12 K/uL    Immature Granulocytes (abs) 0.02 0.00 - 0.11 K/uL    NRBC (Absolute) 0.00 K/uL   Comp Metabolic Panel    Collection Time: 01/13/21  2:16 AM   Result Value Ref Range    Sodium 130 (L) 135 - 145 mmol/L    Potassium 4.0 3.6 - 5.5 mmol/L    Chloride 101 96 - 112 mmol/L    Co2 26 20 - 33 mmol/L    Anion Gap 3.0 (L) 7.0 - 16.0    Glucose 93 65 - 99 mg/dL    Bun 3 (L) 8 - 22 mg/dL    Creatinine 0.36 (L) 0.50 - 1.40 mg/dL    Calcium  7.5 (L) 8.5 - 10.5 mg/dL    AST(SGOT) 12 12 - 45 U/L    ALT(SGPT) 6 2 - 50 U/L    Alkaline Phosphatase 54 30 - 99 U/L    Total Bilirubin 0.2 0.1 - 1.5 mg/dL    Albumin 2.5 (L) 3.2 - 4.9 g/dL    Total Protein 4.7 (L) 6.0 - 8.2 g/dL    Globulin 2.2 1.9 - 3.5 g/dL    A-G Ratio 1.1 g/dL   ESTIMATED GFR    Collection Time: 01/13/21  2:16 AM   Result Value Ref Range    GFR If African American >60 >60 mL/min/1.73 m 2    GFR If Non African American >60 >60 mL/min/1.73 m 2     Medical Decision Making, by Problem:  Active Hospital Problems    Diagnosis   • Nausea [R11.0]     Priority: High   • Anemia [D64.9]     Priority: High   • Sepsis (HCC) [A41.9]     Priority: High   • Gallbladder obstruction [K82.0]     Priority: Medium   • Hypokalemia [E87.6]   • Hyponatremia [E87.1]   • Wound infection after surgery [T81.49XA]   • CN (constipation) [K59.00]   • Anxiety [F41.9]     Plan:  She is tolerating PO well today without nausea/vomiting.  Pain controlled with medication.  Pos BM this morning.  Ambulating halls well.       Ok to DC home per our perspective on oral cephalexin 500mg po QID for 10 days.      MRCP shows CBD dilation 11mm mild to moderate, pancreatic duct dilation of 4mm, no choledocholithiasis, normal gallbladder, 1.9 benign splenic lesion, ascites.  ERCP/EUS recommended.  We would be ok with this as an outpatient referral or inpatient whichever medicine would prefer and an outpatient consult to GI.  Appreciate recommendations.     She verbalizes that she would like wound care and home health after discharge.  Appreciate medicine or case management's help with this.      Quality Measures:  Quality-Core Measures    Discussed patient condition with Patient and Dr. Benavides

## 2021-01-13 NOTE — DISCHARGE PLANNING
Anticipated Discharge Disposition: Home with Hh with wound care    Action: Lsw spoke with spouse Phil regarding HH discharge. Lsw explained that due to pt's location and insurance there is only one agency that can be selected. Phil gave permission to send referral. Phil emphasized that he is eager to have pt return home but wants to make sure that pt is able to eat before returning.     Barriers to Discharge: HH acceptance    Plan: Lsw faxed choice to [facility]

## 2021-01-13 NOTE — CARE PLAN
Problem: Bowel/Gastric:  Goal: Normal bowel function is maintained or improved  Outcome: PROGRESSING AS EXPECTED  Intervention: Educate patient and significant other/support system about diet, fluid intake, medications and activity to promote bowel function  Note: Educated patient about diet advancement, fluid intake, medications, and ambulation. Ambulated with patient in [place].     Problem: Pain Management  Goal: Pain level will decrease to patient's comfort goal  Outcome: PROGRESSING AS EXPECTED  Intervention: Follow pain managment plan developed in collaboration with patient and Interdisciplinary Team  Note: Discussed pain management plan and goals with patient.

## 2021-01-13 NOTE — DISCHARGE PLANNING
Anticipated Discharge Disposition: Home with     Action: Lsw spoke with Harini at [facility] for update on referral. Per Harini, they are able to accept pt clinically, but are still waiting for insurance auth. Harini stated that she may not hear back until tomorrow morning. Lsw updated hospitalist RN Socorro.     Barriers to Discharge: Insurance auth    Plan: Lsw to f/u with MD

## 2021-01-13 NOTE — DISCHARGE PLANNING
Anticipated Discharge Disposition: Home with HH    Action: Lsw spoke to Mary to give update on HH. Lsw spoke with Jenifer who stated that referral is in review and will get back as soon as possible.     Barriers to Discharge: HH acceptance    Plan: Lsw to f/u with HH

## 2021-01-13 NOTE — PROGRESS NOTES
[facility] Medicine Daily Progress Note    Date of Service  1/13/2021    Chief Complaint  60 y.o. female admitted 1/9/2021 with post surgical wound infection.    Hospital Course  60 y.o. female who presented 1/9/2021 with wound infection. Patient had bowel obstruction in December of 2020 that had to be partially resected. Patient was doing ok until about a week ago, she noticed that her wound was beginning to be foul smelling. She did note that there was weeping of the wound site since the surgery however it was initially clear and not malodorous. She was seen in surgical office post op and was told it was just weeping. She states that she grew concerned when she noticed pain around the site and the discharge becoming foul smelling. She denies any fever or chills. She also reports 1 week of intermittent vomiting and nausea. She also states that she has not been able to defecate for the past 4 days which is not normal. She is taking opioids for pain control at this time. She has tried metamucil and miralax and dulcolax at home, with no relief.      She initially presented to [facility] and was transferred here. CT abdomen was done there which revealed peritonitis and moderate to severe constipation. CT also showed distended gallbladder with 9mm dilatation however labs does not show obstructive pattern.       Interval Problem Update  I evaluated and examined the patient at the bedside.  Labs and imaging were reviewed. Care plan was discussed with the patient and bedside nurse.    Patient reported mild nausea, no vomiting.  Constipation resolved.  Patient had tolerated a diet well.  Switched to p.o. antibiotics    MRCP showed An 11 mm CBD with mild to moderate intrahepatic biliary dilatation. Pancreatic duct is also dilated to 4 mm. No choledocholithiasis.  Unclear etiology. Patient denies abdominal pain, liver function is normal, normal bilirubin.  Discussed with the patient, patient preferred to follow-up as  outpatient with GI for further management    MRCP results reviewed by surgery, okay to follow-up as outpatient.      Consultants/Specialty  Surgery    Code Status  Full Code    Disposition  Home health care with wound care    Review of Systems  Review of Systems   Constitutional: Positive for malaise/fatigue. Negative for chills, fever and weight loss.   HENT: Negative for congestion, ear discharge, ear pain, hearing loss, nosebleeds, sinus pain and tinnitus.    Eyes: Negative for blurred vision, double vision, photophobia and pain.   Respiratory: Negative for cough, hemoptysis, sputum production and shortness of breath.    Cardiovascular: Negative for chest pain, palpitations, orthopnea and claudication.   Gastrointestinal: Positive for abdominal pain and nausea. Negative for constipation, diarrhea and vomiting.   Genitourinary: Negative for dysuria, frequency, hematuria and urgency.   Musculoskeletal: Negative for back pain, myalgias and neck pain.   Neurological: Negative for dizziness, tingling, tremors, sensory change, speech change, seizures and headaches.   Psychiatric/Behavioral: Negative for depression, hallucinations and suicidal ideas. The patient does not have insomnia.         Physical Exam  Temp:  [36.2 °C (97.1 °F)-37.1 °C (98.7 °F)] 36.9 °C (98.4 °F)  Pulse:  [] 85  Resp:  [17-18] 18  BP: (100-126)/(67-81) 100/69  SpO2:  [94 %-100 %] 97 %    Physical Exam  Constitutional:       General: She is not in acute distress.     Appearance: She is not toxic-appearing.   HENT:      Head: Normocephalic and atraumatic.      Mouth/Throat:      Pharynx: No oropharyngeal exudate or posterior oropharyngeal erythema.   Eyes:      General:         Right eye: No discharge.         Left eye: No discharge.      Pupils: Pupils are equal, round, and reactive to light.   Neck:      Musculoskeletal: Normal range of motion and neck supple.   Cardiovascular:      Rate and Rhythm: Normal rate and regular rhythm.       Pulses: Normal pulses.      Heart sounds: Normal heart sounds. No murmur. No gallop.    Pulmonary:      Effort: Pulmonary effort is normal. No respiratory distress.      Breath sounds: No stridor. No wheezing or rales.   Abdominal:      General: Abdomen is flat. There is no distension.      Palpations: Abdomen is soft.      Tenderness: There is no guarding.      Comments: lapartomy scar with wound packaged in place at the moment. No obvious signs of bleeding.   Musculoskeletal: Normal range of motion.      Right lower leg: No edema.      Left lower leg: No edema.   Skin:     General: Skin is warm and dry.   Neurological:      General: No focal deficit present.      Mental Status: She is alert and oriented to person, place, and time.      Cranial Nerves: No cranial nerve deficit.      Sensory: No sensory deficit.      Motor: No weakness.   Psychiatric:         Mood and Affect: Mood normal.         Behavior: Behavior normal.         Thought Content: Thought content normal.         Judgment: Judgment normal.         Fluids    Intake/Output Summary (Last 24 hours) at 1/13/2021 1524  Last data filed at 1/12/2021 2000  Gross per 24 hour   Intake 240 ml   Output --   Net 240 ml       Laboratory  Recent Labs     01/11/21  0607 01/12/21  1033 01/13/21  0216   WBC 9.9 6.6 6.9   RBC 2.90* 3.00* 2.85*   HEMOGLOBIN 9.9* 10.0* 9.3*   HEMATOCRIT 30.2* 31.1* 29.4*   .1* 103.7* 103.2*   MCH 34.1* 33.3* 32.6   MCHC 32.8* 32.2* 31.6*   RDW 51.8* 53.3* 52.7*   PLATELETCT 548* 533* 494*   MPV 10.4 10.0 9.8     Recent Labs     01/11/21  0607 01/12/21  1033 01/13/21  0216   SODIUM 129* 131* 130*   POTASSIUM 3.4* 3.1* 4.0   CHLORIDE 98 96 101   CO2 22 26 26   GLUCOSE 100* 149* 93   BUN 5* 3* 3*   CREATININE 0.34* 0.38* 0.36*   CALCIUM 7.5* 7.9* 7.5*                   Imaging  [id number]/O   Final Result      1. An 11 mm CBD with mild to moderate intrahepatic biliary dilatation. Pancreatic duct is also dilated to 4 mm. No  choledocholithiasis. Etiology of this biliary and pancreatic ductal dilatation is uncertain. Consider ERCP/EUS for further evaluation.   2. Normal gallbladder. No cholelithiasis.   3. A 1.9 cm splenic lesion, statistically benign.   4. Partially visualized pockets of loculated ascites within the abdomen.   5. Prior gastric bypass. Moderate dilatation of the afferent limb with air-fluid levels, nonspecific.         DX-CHEST-PORTABLE (1 VIEW)   Final Result      No acute cardiopulmonary findings.           Assessment/Plan  * Wound infection after surgery- (present on admission)  Assessment & Plan  -Plan under sepsis diagnosis  -Growing Staph Aureus sensitive to Unasyn  -Switch to PO once tolerating PO diet.  -As per surgery continue tramadol for pain severe PRN    Nausea  Assessment & Plan  Patient with nausea  We will advance diet as tolerated  We will continue with IV antibiotics for now and switch to PO once tolerating PO    Anemia  Assessment & Plan  Hb on admission was 10.6, yesterday was 8.6. However patient received IV fluid resucitation.  No obvious signs of bleeding at this time, we have ordered iron panel, Vit B12, folate, occult blood stool  Hb has been on the lower side since surgery, so iron deficiency due to surgery could be the culprit  Ferrous gluconate started by surgery  Monitor and make changes accordingly    Sepsis (HCC)- (present on admission)  Assessment & Plan  This is Sepsis Present on admission  SIRS criteria identified on admission include: Tachycardia, with heart rate greater than 90 BPM and Leukocytosis, with WBC greater than 12,000  Source is abdominal wound/peritonitis  Sepsis protocol initiated  Fluid resuscitation ordered per protocol  IV antibiotics as appropriate for source of sepsis  While organ dysfunction may be noted elsewhere in this problem list or in the chart, degree of organ dysfunction does not meet CMS criteria for severe sepsis  -Follow up culture postive for Staph Aureus  sensitive to Unasyn which we will start now and switch to PO once tolerating PO.  -Monitor BP  -Wound care consultation  -Surgery consulted we appreciate further recommendations.      Gallbladder obstruction- (present on admission)  Assessment & Plan  -CT finding shows GB distention with 9mm CBD dilatation though labs do not support obstruction    MRCP: 1. An 11 mm CBD with mild to moderate intrahepatic biliary dilatation. Pancreatic duct is also dilated to 4 mm. No choledocholithiasis. Etiology of this biliary and pancreatic ductal dilatation is uncertain. Consider ERCP/EUS for further evaluation; A 1.9 cm splenic lesion, statistically benign; Partially visualized pockets of loculated ascites within the abdomen; Prior gastric bypass. Moderate dilatation of the afferent limb with air-fluid levels, nonspecific.    Patient denies abdominal pain, liver function is normal, normal bilirubin.  Okay to follow-up as outpatient with GI if patient agrees with it    MRCP results reviewed by surgery, okay to follow-up as outpatient.          Hypokalemia  Assessment & Plan  Replace as needed  Monitor, repeat BMP    Hyponatremia  Assessment & Plan  Mild, asymptomatic  Monitor, repeat BMP    CN (constipation)- (present on admission)  Assessment & Plan  -Antiemetics PRN  -Will start bowel regimen, likely medication induced constipation as patient has been on opioids  -Monitor output    1/10: Patient had a BM this morning. We will continue docusate daily, and Miralax PRN daily.      Anxiety  Assessment & Plan  As per chart review patient takes Alprazolam PRN  We will continue it at this time, however patient would benefit from outpatient follow up to try to switch to SSRI if tolerated  We will defer to PCP        VTE prophylaxis: lovenox

## 2021-01-13 NOTE — DISCHARGE PLANNING
Received Choice form at 1203  Agency/Facility Name: [facility]   Referral sent per Choice form @ [id number]

## 2021-01-13 NOTE — DOCUMENTATION QUERY
[facility]                                                                       Query Response Note      PATIENT:               SUNDEEP ANN  ACCT #:                  [id number]  MRN:                     [id number]  :                      1960  ADMIT DATE:       2021 5:17 PM  DISCH DATE:          RESPONDING  PROVIDER #:        [id number]           QUERY TEXT:    There is conflicting documentation in the medical record regarding peritonitis.  Further clarification is needed.     Based on treatment, clinical findings and risk factors, can this documentation be further clarified?    The patient's Clinical Indicators include:  1. Per ERP-  CT was concerning for some peritonitis;    2. Per H&P and hospitalist PN's -  sepsis source is abdominal wound/peritonitis  3. Per sugery consult - I am really not sure what the peritonitis read on the outside CT is referring to, she certainly does not have          clinical peritonitis; subsequent surgical PN's do not mention peritonitis  has a current incisional infection after surgery.  per MRCP-  Peritoneal cavity: There are pockets of loculated ascites in the anterior abdomen  per abdominal wound cultures - light growth staph aureus    Treatment-  IV ABX; surgery consult    Risks:  surgical incision infection; pockets of ascites in peritoneal cavity per MRCP    Thank You,  Becky Kolb RN, CCDS, CDIP, CCS  Clinical  Lead  Connect via Voal[employer] Messenger  Options provided:   -- Peritonitis ruled in   -- Peritonitis ruled out   -- Unable to determine      Query created by: Becky Kolb on 2021 9:04 AM    RESPONSE TEXT:    Peritonitis ruled out          Electronically signed by:  REBEKAH LEW MD 2021 1:25 PM

## 2021-01-14 VITALS
OXYGEN SATURATION: 98 % | HEART RATE: 98 BPM | BODY MASS INDEX: 21.33 KG/M2 | DIASTOLIC BLOOD PRESSURE: 64 MMHG | HEIGHT: 59 IN | RESPIRATION RATE: 18 BRPM | TEMPERATURE: 97.8 F | WEIGHT: 105.82 LBS | SYSTOLIC BLOOD PRESSURE: 101 MMHG

## 2021-01-14 LAB
ALBUMIN SERPL BCP-MCNC: 2.7 G/DL (ref 3.2–4.9)
ALBUMIN/GLOB SERPL: 1.1 G/DL
ALP SERPL-CCNC: 61 U/L (ref 30–99)
ALT SERPL-CCNC: 11 U/L (ref 2–50)
ANION GAP SERPL CALC-SCNC: 8 MMOL/L (ref 7–16)
AST SERPL-CCNC: 17 U/L (ref 12–45)
BACTERIA BLD CULT: NORMAL
BACTERIA BLD CULT: NORMAL
BASOPHILS # BLD AUTO: 0.4 % (ref 0–1.8)
BASOPHILS # BLD: 0.03 K/UL (ref 0–0.12)
BILIRUB SERPL-MCNC: 0.2 MG/DL (ref 0.1–1.5)
BUN SERPL-MCNC: 4 MG/DL (ref 8–22)
CALCIUM SERPL-MCNC: 8.3 MG/DL (ref 8.5–10.5)
CHLORIDE SERPL-SCNC: 101 MMOL/L (ref 96–112)
CO2 SERPL-SCNC: 23 MMOL/L (ref 20–33)
CREAT SERPL-MCNC: 0.45 MG/DL (ref 0.5–1.4)
EOSINOPHIL # BLD AUTO: 0.87 K/UL (ref 0–0.51)
EOSINOPHIL NFR BLD: 11.8 % (ref 0–6.9)
ERYTHROCYTE [DISTWIDTH] IN BLOOD BY AUTOMATED COUNT: 52.6 FL (ref 35.9–50)
GLOBULIN SER CALC-MCNC: 2.5 G/DL (ref 1.9–3.5)
GLUCOSE SERPL-MCNC: 96 MG/DL (ref 65–99)
HCT VFR BLD AUTO: 32.1 % (ref 37–47)
HGB BLD-MCNC: 10.2 G/DL (ref 12–16)
IMM GRANULOCYTES # BLD AUTO: 0.03 K/UL (ref 0–0.11)
IMM GRANULOCYTES NFR BLD AUTO: 0.4 % (ref 0–0.9)
LYMPHOCYTES # BLD AUTO: 1.57 K/UL (ref 1–4.8)
LYMPHOCYTES NFR BLD: 21.4 % (ref 22–41)
MCH RBC QN AUTO: 32.6 PG (ref 27–33)
MCHC RBC AUTO-ENTMCNC: 31.8 G/DL (ref 33.6–35)
MCV RBC AUTO: 102.6 FL (ref 81.4–97.8)
MONOCYTES # BLD AUTO: 0.76 K/UL (ref 0–0.85)
MONOCYTES NFR BLD AUTO: 10.3 % (ref 0–13.4)
NEUTROPHILS # BLD AUTO: 4.09 K/UL (ref 2–7.15)
NEUTROPHILS NFR BLD: 55.7 % (ref 44–72)
NRBC # BLD AUTO: 0 K/UL
NRBC BLD-RTO: 0 /100 WBC
PLATELET # BLD AUTO: 543 K/UL (ref 164–446)
PMV BLD AUTO: 10.3 FL (ref 9–12.9)
POTASSIUM SERPL-SCNC: 4.4 MMOL/L (ref 3.6–5.5)
PROT SERPL-MCNC: 5.2 G/DL (ref 6–8.2)
RBC # BLD AUTO: 3.13 M/UL (ref 4.2–5.4)
SIGNIFICANT IND 70042: NORMAL
SIGNIFICANT IND 70042: NORMAL
SITE SITE: NORMAL
SITE SITE: NORMAL
SODIUM SERPL-SCNC: 132 MMOL/L (ref 135–145)
SOURCE SOURCE: NORMAL
SOURCE SOURCE: NORMAL
WBC # BLD AUTO: 7.4 K/UL (ref 4.8–10.8)

## 2021-01-14 PROCEDURE — A9270 NON-COVERED ITEM OR SERVICE: HCPCS | Performed by: INTERNAL MEDICINE

## 2021-01-14 PROCEDURE — 80053 COMPREHEN METABOLIC PANEL: CPT

## 2021-01-14 PROCEDURE — 700102 HCHG RX REV CODE 250 W/ 637 OVERRIDE(OP): Performed by: PHYSICIAN ASSISTANT

## 2021-01-14 PROCEDURE — A9270 NON-COVERED ITEM OR SERVICE: HCPCS | Performed by: STUDENT IN AN ORGANIZED HEALTH CARE EDUCATION/TRAINING PROGRAM

## 2021-01-14 PROCEDURE — 85025 COMPLETE CBC W/AUTO DIFF WBC: CPT

## 2021-01-14 PROCEDURE — 700111 HCHG RX REV CODE 636 W/ 250 OVERRIDE (IP): Performed by: INTERNAL MEDICINE

## 2021-01-14 PROCEDURE — 700102 HCHG RX REV CODE 250 W/ 637 OVERRIDE(OP): Performed by: STUDENT IN AN ORGANIZED HEALTH CARE EDUCATION/TRAINING PROGRAM

## 2021-01-14 PROCEDURE — 700102 HCHG RX REV CODE 250 W/ 637 OVERRIDE(OP): Performed by: INTERNAL MEDICINE

## 2021-01-14 PROCEDURE — A9270 NON-COVERED ITEM OR SERVICE: HCPCS | Performed by: PHYSICIAN ASSISTANT

## 2021-01-14 PROCEDURE — 99239 HOSP IP/OBS DSCHRG MGMT >30: CPT | Performed by: STUDENT IN AN ORGANIZED HEALTH CARE EDUCATION/TRAINING PROGRAM

## 2021-01-14 RX ORDER — CEPHALEXIN 500 MG/1
500 CAPSULE ORAL EVERY 6 HOURS
Qty: 36 CAP | Refills: 0 | Status: SHIPPED | OUTPATIENT
Start: 2021-01-14 | End: 2021-01-23

## 2021-01-14 RX ADMIN — DOCUSATE SODIUM 100 MG: 100 CAPSULE, LIQUID FILLED ORAL at 05:12

## 2021-01-14 RX ADMIN — OXYCODONE 5 MG: 5 TABLET ORAL at 03:45

## 2021-01-14 RX ADMIN — OXYCODONE 5 MG: 5 TABLET ORAL at 10:00

## 2021-01-14 RX ADMIN — CEPHALEXIN 500 MG: 500 CAPSULE ORAL at 05:12

## 2021-01-14 RX ADMIN — TRAMADOL HYDROCHLORIDE 50 MG: 50 TABLET, COATED ORAL at 07:59

## 2021-01-14 RX ADMIN — ENOXAPARIN SODIUM 40 MG: 40 INJECTION SUBCUTANEOUS at 05:12

## 2021-01-14 RX ADMIN — SENNOSIDES 8.6 MG: 8.6 TABLET, FILM COATED ORAL at 03:45

## 2021-01-14 RX ADMIN — FERROUS GLUCONATE 324 MG: 324 TABLET ORAL at 10:00

## 2021-01-14 RX ADMIN — ALPRAZOLAM 0.5 MG: 0.5 TABLET ORAL at 06:46

## 2021-01-14 RX ADMIN — CEPHALEXIN 500 MG: 500 CAPSULE ORAL at 10:37

## 2021-01-14 ASSESSMENT — PAIN DESCRIPTION - PAIN TYPE: TYPE: ACUTE PAIN

## 2021-01-14 NOTE — DISCHARGE SUMMARY
Discharge Summary    CHIEF COMPLAINT ON ADMISSION  Chief Complaint   Patient presents with   • Post-Op Complications     transfer from [facility] for above       Reason for Admission  Abdominal Pain     Admission Date  1/9/2021    CODE STATUS  Full Code    HPI & HOSPITAL COURSE  60 y.o. female who presented 1/9/2021 with wound infection. Patient had bowel obstruction in December of 2020 that had to be partially resected. Patient was doing ok until about a week ago, she noticed that her wound was beginning to be foul smelling. She did note that there was weeping of the wound site since the surgery however it was initially clear and not malodorous. She was seen in surgical office post op and was told it was just weeping. She states that she grew concerned when she noticed pain around the site and the discharge becoming foul smelling. She denies any fever or chills. She also reports 1 week of intermittent vomiting and nausea. She also states that she has not been able to defecate for the past 4 days which is not normal. She is taking opioids for pain control at this time. She has tried metamucil and miralax and dulcolax at home, with no relief.      She initially presented to [facility] and was transferred here. CT abdomen was done there which revealed peritonitis and moderate to severe constipation. CT also showed distended gallbladder with 9mm dilatation however labs does not show obstructive pattern.     For wound infection, Growing Staph Aureus sensitive to Unasyn, switched to keflex for additional 10 days per surgery.     CT finding shows GB distention with 9mm CBD dilatation though labs do not support obstruction. MRCP: 1. An 11 mm CBD with mild to moderate intrahepatic biliary dilatation. Pancreatic duct is also dilated to 4 mm. No choledocholithiasis.  Patient denies abdominal pain, liver function is normal, normal bilirubin.  MRCP results reviewed by surgery, okay to follow-up as  outpatient.  Discussed further evaluation with ERCP per GI. Patient preferred outpt follow up.      Patient is discharged with home health care, and close outpatient follow up.    Therefore, she is discharged in good and stable condition to home with close outpatient follow-up.    The patient met 2-midnight criteria for an inpatient stay at the time of discharge.    Discharge Date  1/14/2021    FOLLOW UP ITEMS POST DISCHARGE  Follow up with the surgeon in 1 wk (pt sated she has an appointment next Monday)  Follow up with PCP in 2 wks  Follow up with GI in 1-2 wks for further evaluation of common bile duct dilation.     DISCHARGE DIAGNOSES  Principal Problem:    Wound infection after surgery POA: Yes  Active Problems:    Sepsis (HCC) POA: Yes    Anemia POA: Unknown    Nausea POA: Unknown    Gallbladder obstruction POA: Yes    Anxiety POA: Unknown    CN (constipation) POA: Yes    Hyponatremia POA: Unknown    Hypokalemia POA: Unknown  Resolved Problems:    * No resolved hospital problems. *      FOLLOW UP  No future appointments.  Linsey West D.O.  [address]  [address]  [place] [id number] [phone]    In 2 weeks      surgeon    In 1 week      GI       for further evaluation of common bile duct dilation.       MEDICATIONS ON DISCHARGE     Medication List      START taking these medications      Instructions   cephALEXin 500 MG Caps  Commonly known as: KEFLEX   Take 1 Cap by mouth every 6 hours for 9 days.  Dose: 500 mg        CHANGE how you take these medications      Instructions   ALPRAZolam 0.5 MG Tabs  What changed:   · how much to take  · how to take this  · when to take this  · reasons to take this  Commonly known as: XANAX   take 1 to 2 tablets by mouth once daily if needed for anxiety        CONTINUE taking these medications      Instructions   albuterol 108 (90 Base) MCG/ACT Aers inhalation aerosol  Commonly known as: ProAir HFA   Inhale 2 Puffs every 6 hours as needed for Shortness of  "Breath.  Dose: 2 Puff     B COMPLEX 100 PO   Take 400 mg by mouth every day.  Dose: 400 mg     BD Integra Syringe 25G X 1\" 3 ML Misc  Generic drug: SYRINGE-NEEDLE (DISP) 3 ML   use WITH B12     benzonatate 200 MG capsule  Commonly known as: TESSALON   Take 1 Cap by mouth 3 times a day as needed for Cough.  Dose: 200 mg     betamethasone dipropionate 0.05 % Oint  Commonly known as: DIPROLENE   Used to affected areas of the feet for 1-2 weeks     Calcium 500-100 MG-UNIT Chew   Chew 1 Cap every day.  Dose: 1 Cap     Cayenne 450 MG Caps   Take 1 Cap by mouth every day.  Dose: 1 Cap     cyanocobalamin 1000 MCG/ML Soln  Commonly known as: VITAMIN B-12   inject 1 milliliter INTRAMUSCULAR EVERY 30 DAYS     EQL AIR PROTECTOR PO   Take 1 Tab by mouth every day.  Dose: 1 Tab     Fish Oil 1200 MG Caps   Take 1 Cap by mouth every day.  Dose: 1 Cap     fluticasone 110 MCG/ACT Aero  Commonly known as: FLOVENT HFA   1-2 puffs by mouth up to twice daily for cough     Magnesium 400 MG Tabs   Take 1 Tab by mouth every day.  Dose: 1 Tab     mometasone 50 MCG/ACT nasal spray  Commonly known as: NASONEX   Spray 2 Sprays in nose every day.  Dose: 2 Spray     NON SPECIFIED   Take 1 Tab by mouth every day. bariatric advantage with iron 45 mg  Dose: 1 Tab     omeprazole 20 MG delayed-release capsule  Commonly known as: PRILOSEC   Doctor's comments: Requesting 1 year supply  TAKE 1 CAPSULE BY MOUTH  DAILY     ondansetron 4 MG Tabs tablet  Commonly known as: ZOFRAN   take 1 tablet by mouth every 4 hours if needed for nausea and vomiting     oxyCODONE immediate release 10 MG immediate release tablet  Commonly known as: ROXICODONE   Take 10 mg by mouth every four hours as needed for Severe Pain.  Dose: 10 mg     polyethylene glycol/lytes 17 g Pack  Commonly known as: MIRALAX   Take 17 g by mouth every day.  Dose: 17 g     vitamin D 2000 UNIT Tabs   Take 6,000 Units by mouth every day.  Dose: 6,000 Units     vitamin e 400 UNIT Caps  Commonly " "known as: VITAMIN E   Take 400 Units by mouth every day.  Dose: 400 Units            Allergies  Allergies   Allergen Reactions   • Nsaids      Other reaction(s): Adverse Drug Reaction  \"Bleeder\"   • Tape Rash     Tape causes blisters Wound barrier  OK       DIET  Orders Placed This Encounter   Procedures   • Diet Order Diet: Regular     Standing Status:   Standing     Number of Occurrences:   1     Order Specific Question:   Diet:     Answer:   Regular [1]       ACTIVITY  As tolerated.  Weight bearing as tolerated    CONSULTATIONS  SURGERY    PROCEDURES      LABORATORY  Lab Results   Component Value Date    SODIUM 132 (L) 01/14/2021    POTASSIUM 4.4 01/14/2021    CHLORIDE 101 01/14/2021    CO2 23 01/14/2021    GLUCOSE 96 01/14/2021    BUN 4 (L) 01/14/2021    CREATININE 0.45 (L) 01/14/2021        Lab Results   Component Value Date    WBC 7.4 01/14/2021    HEMOGLOBIN 10.2 (L) 01/14/2021    HEMATOCRIT 32.1 (L) 01/14/2021    PLATELETCT 543 (H) 01/14/2021        Total time of the discharge process exceeds 32 minutes.  "

## 2021-01-14 NOTE — DISCHARGE INSTRUCTIONS
Discharge Instructions    Discharged to home by car with relative. Discharged via wheelchair, hospital escort: Yes.  Special equipment needed: Not Applicable    Be sure to schedule a follow-up appointment with your primary care doctor or any specialists as instructed.     Discharge Plan:   Diet Plan: Discussed  Activity Level: Discussed  Confirmed Follow up Appointment: Patient to Call and Schedule Appointment  Confirmed Symptoms Management: Discussed  Medication Reconciliation Updated: Yes    I understand that a diet low in cholesterol, fat, and sodium is recommended for good health. Unless I have been given specific instructions below for another diet, I accept this instruction as my diet prescription.   Other diet: regular    Special Instructions:   [facility] set up per     Follow up with the surgeon in 1 wk (pt sated she has an appointment next Monday)  Follow up with PCP in 2 wks  Follow up with GI in 1-2 wks for further evaluation of common bile duct dilation.    · Is patient discharged on Warfarin / Coumadin?   No     Depression / Suicide Risk    As you are discharged from this [facility] facility, it is important to learn how to keep safe from harming yourself.    Recognize the warning signs:  · Abrupt changes in personality, positive or negative- including increase in energy   · Giving away possessions  · Change in eating patterns- significant weight changes-  positive or negative  · Change in sleeping patterns- unable to sleep or sleeping all the time   · Unwillingness or inability to communicate  · Depression  · Unusual sadness, discouragement and loneliness  · Talk of wanting to die  · Neglect of personal appearance   · Rebelliousness- reckless behavior  · Withdrawal from people/activities they love  · Confusion- inability to concentrate     If you or a loved one observes any of these behaviors or has concerns about self-harm, here's what you can do:  · Talk about it- your feelings  and reasons for harming yourself  · Remove any means that you might use to hurt yourself (examples: pills, rope, extension cords, firearm)  · Get professional help from the community (Mental Health, Substance Abuse, psychological counseling)  · Do not be alone:Call your Safe Contact- someone whom you trust who will be there for you.  · Call your local CRISIS HOTLINE 502-9528 or 864-882-7964  · Call your local Children's Mobile Crisis Response Team [place] (853) 964-4884 or www.[employer]  · Call the toll free National Suicide Prevention Hotlines   · National Suicide Prevention Lifeline 602-003-AQZU (4593)  · National Hope Line Network 800-SUICIDE (032-2946)

## 2021-03-16 PROBLEM — K76.0 FATTY LIVER: Status: ACTIVE | Noted: 2021-03-16

## 2021-06-01 PROBLEM — M50.30 DEGENERATION OF INTERVERTEBRAL DISC OF CERVICAL REGION: Status: ACTIVE | Noted: 2020-11-12

## 2021-06-01 PROBLEM — R79.89 HIGH SERUM PARATHYROID HORMONE (PTH): Status: RESOLVED | Noted: 2019-03-21 | Resolved: 2021-06-01

## 2021-06-01 PROBLEM — R11.0 NAUSEA: Status: RESOLVED | Noted: 2021-01-11 | Resolved: 2021-06-01

## 2021-06-01 PROBLEM — K21.9 CHRONIC GERD: Status: ACTIVE | Noted: 2020-10-13

## 2021-06-01 PROBLEM — E87.6 HYPOKALEMIA: Status: RESOLVED | Noted: 2021-01-11 | Resolved: 2021-06-01

## 2021-06-01 PROBLEM — M47.812 SPONDYLOSIS OF CERVICAL REGION WITHOUT MYELOPATHY OR RADICULOPATHY: Status: ACTIVE | Noted: 2020-11-12

## 2021-06-01 PROBLEM — E87.1 HYPONATREMIA: Status: RESOLVED | Noted: 2021-01-10 | Resolved: 2021-06-01

## 2021-06-01 PROBLEM — E21.3 HYPERPARATHYROIDISM (HCC): Status: ACTIVE | Noted: 2020-10-13

## 2021-06-01 PROBLEM — M47.817 SPONDYLOSIS OF LUMBOSACRAL SPINE WITHOUT MYELOPATHY: Status: ACTIVE | Noted: 2020-11-12

## 2021-06-10 ENCOUNTER — HOSPITAL ENCOUNTER (OUTPATIENT)
Dept: RADIOLOGY | Facility: MEDICAL CENTER | Age: 61
End: 2021-06-10
Attending: INTERNAL MEDICINE
Payer: COMMERCIAL

## 2021-06-10 DIAGNOSIS — R93.5 ABNORMAL MRI OF THE ABDOMEN: ICD-10-CM

## 2021-06-10 DIAGNOSIS — K83.8 DILATED BILE DUCT: ICD-10-CM

## 2021-06-10 PROCEDURE — 700117 HCHG RX CONTRAST REV CODE 255: Performed by: INTERNAL MEDICINE

## 2021-06-10 PROCEDURE — A9576 INJ PROHANCE MULTIPACK: HCPCS | Performed by: INTERNAL MEDICINE

## 2021-06-10 PROCEDURE — 74183 MRI ABD W/O CNTR FLWD CNTR: CPT

## 2021-06-10 RX ADMIN — GADOTERIDOL 10 ML: 279.3 INJECTION, SOLUTION INTRAVENOUS at 16:45

## 2021-07-08 PROBLEM — F90.2 ATTENTION DEFICIT HYPERACTIVITY DISORDER (ADHD), COMBINED TYPE: Status: ACTIVE | Noted: 2021-07-08

## 2022-02-03 ENCOUNTER — APPOINTMENT (OUTPATIENT)
Dept: RADIOLOGY | Facility: MEDICAL CENTER | Age: 62
End: 2022-02-03
Attending: INTERNAL MEDICINE
Payer: COMMERCIAL

## 2022-02-18 ENCOUNTER — HOSPITAL ENCOUNTER (OUTPATIENT)
Dept: RADIOLOGY | Facility: MEDICAL CENTER | Age: 62
End: 2022-02-18
Attending: INTERNAL MEDICINE
Payer: COMMERCIAL

## 2022-02-18 DIAGNOSIS — R10.9 ABDOMINAL PAIN, UNSPECIFIED ABDOMINAL LOCATION: ICD-10-CM

## 2022-02-18 DIAGNOSIS — R93.5 ABNORMAL MRI OF THE ABDOMEN: ICD-10-CM

## 2022-02-18 DIAGNOSIS — K83.8 DILATED BILE DUCT: ICD-10-CM

## 2022-02-18 PROCEDURE — A9576 INJ PROHANCE MULTIPACK: HCPCS | Performed by: INTERNAL MEDICINE

## 2022-02-18 PROCEDURE — 74183 MRI ABD W/O CNTR FLWD CNTR: CPT

## 2022-02-18 PROCEDURE — 700117 HCHG RX CONTRAST REV CODE 255: Performed by: INTERNAL MEDICINE

## 2022-02-18 RX ADMIN — GADOTERIDOL 10 ML: 279.3 INJECTION, SOLUTION INTRAVENOUS at 15:22

## 2022-07-13 ENCOUNTER — HOSPITAL ENCOUNTER (OUTPATIENT)
Dept: RADIOLOGY | Facility: MEDICAL CENTER | Age: 62
End: 2022-07-13
Attending: COLON & RECTAL SURGERY
Payer: COMMERCIAL

## 2022-07-13 DIAGNOSIS — R19.2: ICD-10-CM

## 2022-07-13 DIAGNOSIS — R10.12 LEFT UPPER QUADRANT PAIN: ICD-10-CM

## 2022-07-13 DIAGNOSIS — R10.32 LEFT LOWER QUADRANT PAIN: ICD-10-CM

## 2022-07-13 DIAGNOSIS — R11.0 NAUSEA: ICD-10-CM

## 2022-07-13 PROCEDURE — 74177 CT ABD & PELVIS W/CONTRAST: CPT

## 2022-07-13 PROCEDURE — 700117 HCHG RX CONTRAST REV CODE 255: Performed by: COLON & RECTAL SURGERY

## 2022-07-13 RX ADMIN — IOHEXOL 90 ML: 350 INJECTION, SOLUTION INTRAVENOUS at 15:24

## 2022-12-22 PROBLEM — M16.11 OSTEOARTHRITIS OF RIGHT HIP: Status: ACTIVE | Noted: 2022-12-20

## 2022-12-22 PROBLEM — A41.9 SEPSIS (HCC): Status: RESOLVED | Noted: 2021-01-09 | Resolved: 2022-12-22

## 2022-12-22 PROBLEM — T81.49XA WOUND INFECTION AFTER SURGERY: Status: RESOLVED | Noted: 2021-01-09 | Resolved: 2022-12-22

## 2022-12-22 PROBLEM — Z98.890 S/P EXPLORATORY LAPAROTOMY: Status: RESOLVED | Noted: 2020-12-28 | Resolved: 2022-12-22

## 2022-12-22 PROBLEM — K82.0: Status: RESOLVED | Noted: 2021-01-09 | Resolved: 2022-12-22

## 2022-12-22 PROBLEM — D64.9 ANEMIA: Status: RESOLVED | Noted: 2021-01-10 | Resolved: 2022-12-22

## 2023-10-04 ENCOUNTER — HOSPITAL ENCOUNTER (OUTPATIENT)
Dept: RADIOLOGY | Facility: MEDICAL CENTER | Age: 63
End: 2023-10-04
Attending: STUDENT IN AN ORGANIZED HEALTH CARE EDUCATION/TRAINING PROGRAM
Payer: COMMERCIAL

## 2023-10-04 DIAGNOSIS — R10.32 LEFT LOWER QUADRANT PAIN: ICD-10-CM

## 2023-10-04 DIAGNOSIS — R10.12 LEFT UPPER QUADRANT PAIN: ICD-10-CM

## 2023-10-04 DIAGNOSIS — Z98.84 HISTORY OF ROUX-EN-Y GASTRIC BYPASS: ICD-10-CM

## 2023-10-04 PROCEDURE — 74177 CT ABD & PELVIS W/CONTRAST: CPT

## 2023-10-04 PROCEDURE — 700117 HCHG RX CONTRAST REV CODE 255: Performed by: STUDENT IN AN ORGANIZED HEALTH CARE EDUCATION/TRAINING PROGRAM

## 2023-10-04 RX ADMIN — IOHEXOL 100 ML: 350 INJECTION, SOLUTION INTRAVENOUS at 14:59

## 2025-01-23 ENCOUNTER — APPOINTMENT (OUTPATIENT)
Dept: RADIOLOGY | Facility: IMAGING CENTER | Age: 65
End: 2025-01-23
Attending: PHYSICIAN ASSISTANT
Payer: COMMERCIAL

## 2025-01-23 ENCOUNTER — OFFICE VISIT (OUTPATIENT)
Dept: URGENT CARE | Facility: CLINIC | Age: 65
End: 2025-01-23
Payer: COMMERCIAL

## 2025-01-23 VITALS
HEIGHT: 59 IN | SYSTOLIC BLOOD PRESSURE: 118 MMHG | WEIGHT: 94.4 LBS | BODY MASS INDEX: 19.03 KG/M2 | OXYGEN SATURATION: 93 % | TEMPERATURE: 96.7 F | DIASTOLIC BLOOD PRESSURE: 80 MMHG | HEART RATE: 86 BPM | RESPIRATION RATE: 14 BRPM

## 2025-01-23 DIAGNOSIS — S69.92XA LEFT WRIST INJURY, INITIAL ENCOUNTER: ICD-10-CM

## 2025-01-23 DIAGNOSIS — S69.91XA INJURY OF RIGHT WRIST, INITIAL ENCOUNTER: ICD-10-CM

## 2025-01-23 DIAGNOSIS — S52.502A CLOSED FRACTURE OF DISTAL END OF LEFT RADIUS, UNSPECIFIED FRACTURE MORPHOLOGY, INITIAL ENCOUNTER: ICD-10-CM

## 2025-01-23 PROCEDURE — 73110 X-RAY EXAM OF WRIST: CPT | Mod: TC,LT | Performed by: RADIOLOGY

## 2025-01-23 PROCEDURE — 3079F DIAST BP 80-89 MM HG: CPT | Performed by: PHYSICIAN ASSISTANT

## 2025-01-23 PROCEDURE — 3074F SYST BP LT 130 MM HG: CPT | Performed by: PHYSICIAN ASSISTANT

## 2025-01-23 PROCEDURE — 99204 OFFICE O/P NEW MOD 45 MIN: CPT | Performed by: PHYSICIAN ASSISTANT

## 2025-01-23 PROCEDURE — 73110 X-RAY EXAM OF WRIST: CPT | Mod: TC,RT | Performed by: RADIOLOGY

## 2025-01-23 ASSESSMENT — ENCOUNTER SYMPTOMS
WEAKNESS: 0
MUSCLE WEAKNESS: 0
CHILLS: 0
NUMBNESS: 0
FEVER: 0
TINGLING: 0
SENSORY CHANGE: 0
FOCAL WEAKNESS: 0

## 2025-01-23 ASSESSMENT — FIBROSIS 4 INDEX: FIB4 SCORE: 1.33

## 2025-01-24 NOTE — PROGRESS NOTES
Subjective     Lachelle Hilton is a 64 y.o. female who presents with Wrist Injury (L wrist, took a fall, swelling x today)            Wrist Injury   The incident occurred less than 1 hour ago. The incident occurred at home (patient was walking dogs and fell on outstretched hands- has bilateral wrist pain). The injury mechanism was a fall. The pain is present in the left wrist, right wrist and left forearm. The quality of the pain is described as aching. The pain does not radiate. The pain is moderate. The pain has been Constant since the incident. Pertinent negatives include no chest pain, muscle weakness, numbness or tingling. The symptoms are aggravated by movement, lifting and palpation. She has tried ice for the symptoms.       Past Medical History:   Diagnosis Date    Acid reflux     Anxiety     Arthritis     osteo    Bronchitis 2016    Cardiac arrest (Former[facility]) 04/18/1999    sepsis    Coccygeal pain, chronic 2012    an employee ran into her and she fell on [email or web address]    Dental disorder     implants    Gallbladder obstruction 01/09/2021    Heart burn     History of colonoscopy 07/15/2014    Normal and good for 10 years.  Done at [facility].  Also completed in 10/20 w/ EGD.    History of facial fracture     History of mammogram 06/17/2016    mammo    History of Papanicolaou smear of cervix 06/24/2016    normal at [facility]    History of pneumococcal infection 1999; 2011    History of pneumonia     Never a smoker    Indigestion     Lumbar spine pain     Last epidural in early 2013, she's had trigger point injections that are somewhat helpful.    Osteoporosis 2014    RECLAST received 9/'14, 9/'15 thru [facility]; last dexa at [facility] showed T-score -3.0 total L hip    Osteoporosis     Reclast #3 received 1/11/17 at [facility]    Pain 2020    everyplace    Pneumonia     Psychiatric problem     anxiety    Sepsis ([facility]) 01/18/1999    cardiac arrest while septic    Sepsis ([facility]) 01/09/2021    Weakness 2020    generalized       .  Past  "Surgical History:   Procedure Laterality Date    TRIGGER FINGER RELEASE Left 05/28/2024    left thumb; Dr. Hsieh--[facility]    PARATHYROIDECTOMY N/A 01/31/2022    Dr. Bernardo    BOWEL RESECTION  12/24/2020    Procedure: EXCISION, INTESTINE- SMALL WITH CLOSURE OF GASTRIC FISTULA ...;  Surgeon: Royce Benavides M.D.;  Location: SURGERY [facility];  Service: General    PANNICULECTOMY  2001    GASTRIC BYPASS LAPAROSCOPIC  04/1999    sabine-en-y collapsed; complications over one year after. [facility]--Dr. Anne. She reports having sepsis afterwards.    TRACHEOSTOMY  1999    closed 1999    OTHER ABDOMINAL SURGERY      multiple hernia    PARATHYROIDECTOMY Left          Family History   Problem Relation Age of Onset    Heart Disease Mother 67        CHF    Cancer Father 58        brain and lung; former smoker     Allergies:  Nsaids, Aspirin, and Tape    Medications, Allergies, and current problem list reviewed today in Epic    Review of Systems   Constitutional:  Negative for chills, fever and malaise/fatigue.   Cardiovascular:  Negative for chest pain.   Musculoskeletal:  Positive for joint pain (bilateral wrist pain).   Neurological:  Negative for tingling, sensory change, focal weakness, weakness and numbness.     All other systems reviewed and are negative.              Objective     /80 (BP Location: Right arm, Patient Position: Sitting, BP Cuff Size: Adult)   Pulse 86   Temp 35.9 °C (96.7 °F) (Temporal)   Resp 14   Ht 1.499 m (4' 11\")   Wt 42.8 kg (94 lb 6.4 oz)   SpO2 93%   BMI 19.07 kg/m²      Physical Exam  Constitutional:       General: She is not in acute distress.     Appearance: She is not ill-appearing.   HENT:      Head: Normocephalic and atraumatic.   Cardiovascular:      Rate and Rhythm: Normal rate and regular rhythm.   Pulmonary:      Effort: Pulmonary effort is normal. No respiratory distress.      Breath sounds: No stridor. No wheezing.   Musculoskeletal:      Right wrist: " Tenderness (palmar aspect- mid wrist joint) present. No swelling. Normal range of motion.      Left wrist: Swelling, tenderness and bony tenderness (distal radius) present. No snuff box tenderness or crepitus. Decreased range of motion. Normal pulse.        Arms:       Comments: Bilateral hands with distal n/v intact and brisk capillary refill.   Skin:     General: Skin is warm and dry.   Neurological:      General: No focal deficit present.      Mental Status: She is alert and oriented to person, place, and time.   Psychiatric:         Mood and Affect: Mood normal.         Behavior: Behavior normal.         Thought Content: Thought content normal.         Judgment: Judgment normal.          1/23/2025 5:54 PM     HISTORY/REASON FOR EXAM:  Pain/Deformity Following Trauma.  Fall onto at stress and, pain.     TECHNIQUE/EXAM DESCRIPTION AND NUMBER OF VIEWS:  4 views of the LEFT wrist.     COMPARISON: None     FINDINGS:  Artifact from jewelry noted.  Irregular lucency in the distal radial metaphysis with cortical disruption indicating comminuted and impacted fracture.  Distal ulna is intact.  Carpal alignment is preserved.  No carpal fracture demonstrated.  No dislocation.  Mild dorsal soft tissue swelling at the wrist.     IMPRESSION:     Comminuted and impacted distal LEFT radial metaphyseal fracture.              1/23/2025 5:54 PM     HISTORY/REASON FOR EXAM:  Pain/Deformity Following Trauma.        TECHNIQUE/EXAM DESCRIPTION AND NUMBER OF VIEWS:  4 views of the RIGHT wrist.     COMPARISON: None     FINDINGS:  There is no fracture or dislocation.  The visualized osseous structures are in anatomic alignment.  The joint spaces are preserved. There is calcification of the triangular fibrocartilage.  Bone mineralization is decreased..        IMPRESSION:     No acute osseous abnormality.           Assessment & Plan        Assessment & Plan  Closed fracture of distal end of left radius, unspecified fracture morphology,  initial encounter    Orders:    DX-WRIST-COMPLETE 3+ LEFT; Future    Referral to Orthopedics    Injury of right wrist, initial encounter    Orders:    DX-WRIST-COMPLETE 3+ RIGHT; Future        Patient is on chronic pain medicine.  RICE   [place] splint and sling  Referral placed to ortho    Differential diagnoses, Supportive care, and indications for immediate follow-up discussed with patient.   Pathogenesis of diagnosis discussed including typical length and natural progression.   Instructed to return to clinic or nearest emergency department for any change in condition, further concerns, or worsening of symptoms.    The patient demonstrated a good understanding and agreed with the treatment plan.    Deneen Mendez P.A.-C.

## (undated) DEVICE — TUBING CLEARLINK DUO-VENT - C-FLO (48EA/CA)

## (undated) DEVICE — GLOVE BIOGEL PI INDICATOR SZ 7.5 SURGICAL PF LF -(50/BX 4BX/CA)

## (undated) DEVICE — ELECTRODE DUAL RETURN W/ CORD - (50/PK)

## (undated) DEVICE — SUTURE 0 COATED VICRYL 6-18IN - (12PK/BX)

## (undated) DEVICE — STAPLE 60MM BLUE 3.5MM - ECHELON (12/BX)

## (undated) DEVICE — MASK ANESTHESIA ADULT  - (100/CA)

## (undated) DEVICE — GOWN SURGEONS X-LARGE - DISP. (30/CA)

## (undated) DEVICE — GLOVE BIOGEL PI INDICATOR SZ 6.5 SURGICAL PF LF - (50/BX 4BX/CA)

## (undated) DEVICE — NEPTUNE 4 PORT MANIFOLD - (20/PK)

## (undated) DEVICE — SET LEADWIRE 5 LEAD BEDSIDE DISPOSABLE ECG (1SET OF 5/EA)

## (undated) DEVICE — PACK MAJOR BASIN - (2EA/CA)

## (undated) DEVICE — TUBE CONNECT SUCTION CLEAR 120 X 1/4" (50EA/CA)"

## (undated) DEVICE — KIT ANESTHESIA W/CIRCUIT & 3/LT BAG W/FILTER (20EA/CA)

## (undated) DEVICE — LIGASURE TISSUE FUSION  - SINGLE USE (6/CA)

## (undated) DEVICE — SUCTION INSTRUMENT YANKAUER BULBOUS TIP W/O VENT (50EA/CA)

## (undated) DEVICE — GRAFT MESH SEPRAFILM PRO PACK - 5/BX CONTAINS 6 3X5 PIECES

## (undated) DEVICE — SPONGE GAUZESTER 4 X 4 4PLY - (128PK/CA)

## (undated) DEVICE — HEAD HOLDER JUNIOR/ADULT

## (undated) DEVICE — PROTECTOR ULNA NERVE - (36PR/CA)

## (undated) DEVICE — SUTURE 3-0 VICRYL PLUS SH - 8X 18 INCH (12/BX)

## (undated) DEVICE — SUTURE GENERAL

## (undated) DEVICE — CLIP LG INTNL HRZN TI ESCP LGT - (20/BX)

## (undated) DEVICE — GLOVE SZ 7 BIOGEL PI MICRO - PF LF (50PR/BX 4BX/CA)

## (undated) DEVICE — GLOVE SZ 6 BIOGEL PI MICRO - PF LF (50PR/BX 4BX/CA)

## (undated) DEVICE — GOWN WARMING STANDARD FLEX - (30/CA)

## (undated) DEVICE — STAPLE 75MM LINEAR (12EA/BX)

## (undated) DEVICE — SUTURE 2-0 VICRYL PLUS TP-1 - (24/BX)

## (undated) DEVICE — SET EXTENSION WITH 2 PORTS (48EA/CA) ***PART #2C8610 IS A SUBSTITUTE*****

## (undated) DEVICE — CLIP MED LG INTNL HRZN TI ESCP - (20/BX)

## (undated) DEVICE — ELECTRODE 850 FOAM ADHESIVE - HYDROGEL RADIOTRNSPRNT (50/PK)

## (undated) DEVICE — DRAPE MAYO STAND - (30/CA)

## (undated) DEVICE — CLIP MED INTNL HRZN TI ESCP - (25/BX)

## (undated) DEVICE — SENSOR SPO2 NEO LNCS ADHESIVE (20/BX) SEE USER NOTES

## (undated) DEVICE — STAPLER 60MM BLUE 3.5MM WITH - STAPLE (3EA/BX)

## (undated) DEVICE — SLEEVE, VASO, THIGH, MED

## (undated) DEVICE — SODIUM CHL IRRIGATION 0.9% 1000ML (12EA/CA)

## (undated) DEVICE — CANISTER SUCTION 3000ML MECHANICAL FILTER AUTO SHUTOFF MEDI-VAC NONSTERILE LF DISP  (40EA/CA)

## (undated) DEVICE — STAPLER 75MM LINEAR OPEN (3EA/BX)

## (undated) DEVICE — CHLORAPREP 26 ML APPLICATOR - ORANGE TINT(25/CA)

## (undated) DEVICE — LACTATED RINGERS INJ 1000 ML - (14EA/CA 60CA/PF)

## (undated) DEVICE — GRAFT MESH SEPRAFILM - 10/BX